# Patient Record
Sex: FEMALE | Race: BLACK OR AFRICAN AMERICAN | Employment: UNEMPLOYED | URBAN - METROPOLITAN AREA
[De-identification: names, ages, dates, MRNs, and addresses within clinical notes are randomized per-mention and may not be internally consistent; named-entity substitution may affect disease eponyms.]

---

## 2017-01-06 ENCOUNTER — OFFICE VISIT (OUTPATIENT)
Dept: FAMILY MEDICINE CLINIC | Age: 57
End: 2017-01-06

## 2017-01-06 VITALS
HEIGHT: 67 IN | TEMPERATURE: 97.6 F | DIASTOLIC BLOOD PRESSURE: 70 MMHG | OXYGEN SATURATION: 96 % | HEART RATE: 61 BPM | BODY MASS INDEX: 25.27 KG/M2 | WEIGHT: 161 LBS | SYSTOLIC BLOOD PRESSURE: 105 MMHG | RESPIRATION RATE: 18 BRPM

## 2017-01-06 DIAGNOSIS — F33.2 SEVERE EPISODE OF RECURRENT MAJOR DEPRESSIVE DISORDER, WITHOUT PSYCHOTIC FEATURES (HCC): ICD-10-CM

## 2017-01-06 DIAGNOSIS — M54.50 ACUTE BILATERAL LOW BACK PAIN WITHOUT SCIATICA: ICD-10-CM

## 2017-01-06 DIAGNOSIS — M79.7 FIBROMYALGIA: ICD-10-CM

## 2017-01-06 RX ORDER — PAROXETINE HYDROCHLORIDE 20 MG/1
20 TABLET, FILM COATED ORAL DAILY
Qty: 30 TAB | Refills: 1 | Status: SHIPPED | OUTPATIENT
Start: 2017-01-06 | End: 2017-01-11

## 2017-01-06 RX ORDER — TRAZODONE HYDROCHLORIDE 50 MG/1
50 TABLET ORAL
Qty: 30 TAB | Refills: 1 | Status: SHIPPED | OUTPATIENT
Start: 2017-01-06 | End: 2017-01-11 | Stop reason: SDUPTHER

## 2017-01-06 RX ORDER — GABAPENTIN 300 MG/1
300 CAPSULE ORAL 3 TIMES DAILY
Qty: 90 CAP | Refills: 1 | Status: SHIPPED | OUTPATIENT
Start: 2017-01-06 | End: 2017-02-07 | Stop reason: SDUPTHER

## 2017-01-06 NOTE — PROGRESS NOTES
Subjective:      Yonny Fishman is an 49 y.o. female seen for follow up on fibromyalgia. Her symptoms are made worse by: overuse, illness. They are helped by heat, rest.   Associated symptoms include fatigue. Patient denies associated fevers, muscle weakness. Previous treatments include prescription meds - see below. Tolerating gabapentin well, does report some mild drowsiness at times  She will be starting physical therapy for her fibromyalgia and shoulder injury next week. Limitation on activities include none. Reports significant improvement in depression symptoms since restarting trazodone and paroxetine. Tolerating medications well without apparent se. Has appointment to establish care with psychiatrist next week. Denies suicidal thoughts or behaviors.       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     Allergies   Allergen Reactions    Ambien [Zolpidem] Unknown (comments)     Causes Migraine    Cephalexin Rash    Morphine Rash    Motrin [Ibuprofen] Swelling    Nortriptyline Unknown (comments)     Bleeding from mouth    Protonix [Pantoprazole] Rash    Vicodin [Hydrocodone-Acetaminophen] Unknown (comments)     Causes Migraines    Vitamins For Infusion Unknown (comments)     Pt stated all vitamins cause her lightheadness and sick     Past Medical History   Diagnosis Date    Anal fistula     Asthma     Back ache     Cholesterol blood decreased     Dementia     Depression     Deviated septum     Fibromyalgia     Frequent headaches     Heart valve disease     Herniated nucleus pulposus, L5-S1     Migraine     Protein S deficiency (HCC)     Pulmonary emboli (HCC)     Pulmonary hypertension (HCC)     Restless leg syndrome     RLS (restless legs syndrome)     Sickle cell trait ([facility]     Sleep apnea     Stroke [facility])     Upper airway resistance syndrome     Vaginal fistula      Past Surgical History   Procedure Laterality Date    Pr cardiac surg procedure unlist  9/23/99     closure of unroofed coronary sinus     Family History   Problem Relation Age of Onset    Diabetes Mother     Heart Disease Mother     Hypertension Mother     Sickle Cell Anemia Sister     Hypertension Brother     Stroke Maternal Aunt      Social History   Substance Use Topics    Smoking status: Never Smoker    Smokeless tobacco: Never Used    Alcohol use No        Review of Systems  A comprehensive review of systems was negative except for that written in the HPI. Objective:     Visit Vitals    /70 (BP 1 Location: Right arm, BP Patient Position: Sitting)    Pulse 61    Temp 97.6 °F (36.4 °C) (Oral)    Resp 18    Ht 5' 7\" (1.702 m)    Wt 161 lb (73 kg)    SpO2 96%    BMI 25.22 kg/m2     General appearance: alert, cooperative, no distress, appears stated age  Neck: supple, symmetrical, trachea midline, no adenopathy and no JVD  Lungs: clear to auscultation bilaterally  Heart: regular rate and rhythm, S1, S2 normal, no murmur, click, rub or gallop  Extremities: extremities normal, atraumatic, no cyanosis or edema  Neurologic: Grossly normal    Tender Points: none    Assessment:     Fibromyalgia. Severity = mild, tolerable. Overall course = gradually improving      ICD-10-CM ICD-9-CM   1. Acute bilateral low back pain without sciatica M54.5 724.2     338.19   2. Fibromyalgia M79.7 729.1   3. Severe episode of recurrent major depressive disorder, without psychotic features ([facility] F33.2 296.33       Plan:     Orders Placed This Encounter    gabapentin (NEURONTIN) 300 mg capsule    traZODone (DESYREL) 50 mg tablet    PARoxetine (PAXIL) 20 mg tablet     Discussed fibromyalgia with patient along with my philosophy of management.   Continue gabapentin  Start PT as planned  Keep scheduled appt with pyschiatrist    Follow-up Disposition:  Return in about 3 months (around 4/6/2017) for f/u depression, fibromyalgia. I have discussed the diagnosis with the patient and the intended plan as seen in the above orders. The patient has received an after-visit summary and questions were answered concerning future plans. Patient conveyed understanding of the plan at the time of the visit. Marquita Smith NP  01/06/17      .

## 2017-01-06 NOTE — MR AVS SNAPSHOT
Visit Information Date & Time Provider Department Dept. Phone Encounter #  
 1/6/2017 11:00 AM Ricke Blizzard,  [facility] [phone] [id number] Follow-up Instructions Return in about 3 months (around 4/6/2017) for f/u depression, fibromyalgia. Your Appointments 1/11/2017  9:00 AM  
New Patient with Mariana Schaumann, NP [facility] ([address]) Appt Note: new pt for depression referred by JUANA Soto [address] [facility] Liliya Pickett 178  
  
   
 [address] [facility] [address] [facility] [id number] Upcoming Health Maintenance Date Due Hepatitis C Screening 1960 BREAST CANCER SCRN MAMMOGRAM 11/25/2012 FOBT Q 1 YEAR AGE 50-75 3/22/2017 PAP AKA CERVICAL CYTOLOGY 12/13/2019 DTaP/Tdap/Td series (2 - Td) 12/13/2026 Allergies as of 1/6/2017  Review Complete On: 1/6/2017 By: Danilo Clear Severity Noted Reaction Type Reactions Ambien [Zolpidem]  09/09/2016    Unknown (comments) Causes Migraine Cephalexin  03/17/2016    Rash Morphine  03/17/2016    Rash Motrin [Ibuprofen]  09/09/2016    Swelling Nortriptyline  09/09/2016    Unknown (comments) Bleeding from mouth Protonix [Pantoprazole]  03/17/2016    Rash Vicodin [Hydrocodone-acetaminophen]  09/09/2016    Unknown (comments) Causes Migraines Vitamins For Infusion  09/09/2016    Unknown (comments) Pt stated all vitamins cause her lightheadness and sick Current Immunizations  Reviewed on 6/16/2016 Name Date Influenza High Dose Vaccine PF 8/15/2016 Influenza Vaccine 9/1/2015 Pneumococcal Vaccine (Unspecified Type) 9/1/2015 Tdap 12/13/2016 Not reviewed this visit You Were Diagnosed With   
  
 Codes Comments Acute bilateral low back pain without sciatica     ICD-10-CM: M54.5 ICD-9-CM: 724.2, 338.19 Fibromyalgia     ICD-10-CM: M79.7 ICD-9-CM: 729.1 Severe episode of recurrent major depressive disorder, without psychotic features ([facility]     ICD-10-CM: F33.2 ICD-9-CM: 296.33 Vitals BP Pulse Temp Resp Height(growth percentile) Weight(growth percentile) 105/70 (BP 1 Location: Right arm, BP Patient Position: Sitting) 61 97.6 °F (36.4 °C) (Oral) 18 5' 7\" (1.702 m) 161 lb (73 kg) SpO2 BMI OB Status Smoking Status 96% 25.22 kg/m2 Hysterectomy Never Smoker BMI and BSA Data Body Mass Index Body Surface Area  
 25.22 kg/m 2 1.86 m 2 Preferred Pharmacy Pharmacy Name Phone [facility] [address] [place] [phone] Your Updated Medication List  
  
   
This list is accurate as of: 1/6/17 11:14 AM.  Always use your most recent med list.  
  
  
  
  
 albuterol 2.5 mg /3 mL (0.083 %) nebulizer solution Commonly known as:  PROVENTIL VENTOLIN  
by Nebulization route once. ARIPiprazole 2 mg tablet Commonly known as:  ABILIFY Take 2 mg by mouth daily. aspirin 81 mg chewable tablet Take 81 mg by mouth daily. buPROPion  mg XL tablet Commonly known as:  Tresa Sizer Take 300 mg by mouth every morning. Cetirizine 10 mg Cap Take  by mouth. DEPAKOTE 250 mg tablet Generic drug:  divalproex DR Take 250 mg by mouth three (3) times daily. donepezil 10 mg tablet Commonly known as:  ARICEPT Take 10 mg by mouth nightly. fluticasone 50 mcg/actuation nasal spray Commonly known as:  Cathlyn Nelli 2 Sprays by Both Nostrils route daily. fluticasone-salmeterol 500-50 mcg/dose diskus inhaler Commonly known as:  ADVAIR Take 1 Puff by inhalation every twelve (12) hours. gabapentin 300 mg capsule Commonly known as:  NEURONTIN Take 1 Cap by mouth three (3) times daily. melatonin 3 mg tablet Take  by mouth.  
  
 montelukast 10 mg tablet Commonly known as:  SINGULAIR  
 Take 1 Tab by mouth daily. NAMENDA XR 28 mg capsule Generic drug:  memantine ER One cap once daily. PARoxetine 20 mg tablet Commonly known as:  PAXIL Take 1 Tab by mouth daily. raNITIdine 150 mg tablet Commonly known as:  ZANTAC Take 150 mg by mouth two (2) times a day. simethicone 180 mg Cap Take  by mouth. simvastatin 20 mg tablet Commonly known as:  ZOCOR Take 1 Tab by mouth nightly. topiramate 100 mg tablet Commonly known as:  TOPAMAX Take  by mouth two (2) times a day. traZODone 50 mg tablet Commonly known as:  Yeh Ofelia Take 1 Tab by mouth nightly as needed for Sleep. Prescriptions Sent to Pharmacy Refills  
 gabapentin (NEURONTIN) 300 mg capsule 1 Sig: Take 1 Cap by mouth three (3) times daily. Class: Normal  
 Pharmacy: [address] #: [phone] Route: Oral  
 traZODone (DESYREL) 50 mg tablet 1 Sig: Take 1 Tab by mouth nightly as needed for Sleep. Class: Normal  
 Pharmacy: [address] #: [phone] Route: Oral  
 PARoxetine (PAXIL) 20 mg tablet 1 Sig: Take 1 Tab by mouth daily. Class: Normal  
 Pharmacy: [address] #: [phone] Route: Oral  
  
Follow-up Instructions Return in about 3 months (around 4/6/2017) for f/u depression, fibromyalgia. To-Do List   
 03/13/2017 11:00 AM  
  Appointment with Elton Urbina MD at . [facility] ([phone]) Patient Instructions Recovering From Depression: Care Instructions Your Care Instructions Taking good care of yourself is important as you recover from depression. In time, your symptoms will fade as your treatment takes hold. Do not give up. Instead, focus your energy on getting better. Your mood will improve. It just takes some time. Focus on things that can help you feel better, such as being with friends and family, eating well, and getting enough rest. But take things slowly. Do not do too much too soon. You will begin to feel better gradually. Follow-up care is a key part of your treatment and safety. Be sure to make and go to all appointments, and call your doctor if you are having problems. It's also a good idea to know your test results and keep a list of the medicines you take. How can you care for yourself at home? Be realistic · If you have a large task to do, break it up into smaller steps you can handle, and just do what you can. · You may want to put off important decisions until your depression has lifted. If you have plans that will have a major impact on your life, such as marriage, divorce, or a job change, try to wait a bit. Talk it over with friends and loved ones who can help you look at the overall picture first. 
· Reaching out to people for help is important. Do not isolate yourself. Let your family and friends help you. Find someone you can trust and confide in, and talk to that person. · Be patient, and be kind to yourself. Remember that depression is not your fault and is not something you can overcome with willpower alone. Treatment is necessary for depression, just like for any other illness. Feeling better takes time, and your mood will improve little by little. Stay active · Stay busy and get outside. Take a walk, or try some other light exercise. · Talk with your doctor about an exercise program. Exercise can help with mild depression. · Go to a movie or concert. Take part in a Judaism activity or other social gathering. Go to a ball game. · Ask a friend to have dinner with you. Take care of yourself · Eat a balanced diet with plenty of fresh fruits and vegetables, whole grains, and lean protein.  If you have lost your appetite, eat small snacks rather than large meals. · Avoid drinking alcohol or using illegal drugs. Do not take medicines that have not been prescribed for you. They may interfere with medicines you may be taking for depression, or they may make your depression worse. · Take your medicines exactly as they are prescribed. You may start to feel better within 1 to 3 weeks of taking antidepressant medicine. But it can take as many as 6 to 8 weeks to see more improvement. If you have questions or concerns about your medicines, or if you do not notice any improvement by 3 weeks, talk to your doctor. · If you have any side effects from your medicine, tell your doctor. Antidepressants can make you feel tired, dizzy, or nervous. Some people have dry mouth, constipation, headaches, sexual problems, or diarrhea. Many of these side effects are mild and will go away on their own after you have been taking the medicine for a few weeks. Some may last longer. Talk to your doctor if side effects are bothering you too much. You might be able to try a different medicine. · Get enough sleep. If you have problems sleeping: ¨ Go to bed at the same time every night, and get up at the same time every morning. ¨ Keep your bedroom dark and quiet. ¨ Do not exercise after 5:00 p.m. ¨ Avoid drinks with caffeine after 5:00 p.m. · Avoid sleeping pills unless they are prescribed by the doctor treating your depression. Sleeping pills may make you groggy during the day, and they may interact with other medicine you are taking. · If you have any other illnesses, such as diabetes, heart disease, or high blood pressure, make sure to continue with your treatment. Tell your doctor about all of the medicines you take, including those with or without a prescription. · Keep the numbers for these national suicide hotlines: 1-139-628-TALK (7-580.480.1471) and 1-841-BLSVRUY (2-717.182.9941).  If you or someone you know talks about suicide or feeling hopeless, get help right away. When should you call for help? Call 911 anytime you think you may need emergency care. For example, call if: 
· You feel like hurting yourself or someone else. · Someone you know has depression and is about to attempt or is attempting suicide. Call your doctor now or seek immediate medical care if: 
· You hear voices. · Someone you know has depression and: 
¨ Starts to give away his or her possessions. ¨ Uses illegal drugs or drinks alcohol heavily. ¨ Talks or writes about death, including writing suicide notes or talking about guns, knives, or pills. ¨ Starts to spend a lot of time alone. ¨ Acts very aggressively or suddenly appears calm. Watch closely for changes in your health, and be sure to contact your doctor if: 
· You do not get better as expected. Where can you learn more? Go to http://shell-td.info/. Enter V763 in the search box to learn more about \"Recovering From Depression: Care Instructions. \" Current as of: July 26, 2016 Content Version: 11.1 © 1849-7508 [employer]. Care instructions adapted under license by [employer] (which disclaims liability or warranty for this information). If you have questions about a medical condition or this instruction, always ask your healthcare professional. [address] any warranty or liability for your use of this information. Fibromyalgia: Care Instructions Your Care Instructions Fibromyalgia is a painful condition that is not completely understood by medical experts. The cause of fibromyalgia is not known. It can make you feel tired and ache all over. It causes tender spots at specific points of the body that hurt only when you press on them. You may have trouble sleeping, as well as other symptoms. These problems can upset your work and home life. Symptoms tend to come and go, although they may never go away completely. Fibromyalgia does not harm your muscles, joints, or organs. Follow-up care is a key part of your treatment and safety. Be sure to make and go to all appointments, and call your doctor if you are having problems. It's also a good idea to know your test results and keep a list of the medicines you take. How can you care for yourself at home? · Exercise often. Walk, swim, or bike to help with pain and sleep problems and to make you feel better. · Try to get a good night's sleep. Go to bed and get up at the same time each day, whether you feel rested or not. Make sure you have a good mattress and pillow. · Reduce stress. Avoid things that cause you stress, if you can. If not, work at making them less stressful. Learn to use biofeedback, guided imagery, meditation, or other methods to relax. · Make healthy changes. Eat a balanced diet, quit smoking, and limit alcohol and caffeine. · Use a heating pad set on low or take warm baths or showers for pain. Using cold packs for up to 20 minutes at a time can also relieve pain. Put a thin cloth between the cold pack and your skin. A gentle massage might help too. · Be safe with medicines. Take your medicines exactly as prescribed. Call your doctor if you think you are having a problem with your medicine. Your doctor may talk to you about taking antidepressant medicines. These medicines may improve sleep, relieve pain, and in some cases treat depression. · Learn about fibromyalgia. This makes coping easier. Then, take an active role in your treatment. · Think about joining a support group with others who have fibromyalgia to learn more and get support. When should you call for help? Watch closely for changes in your health, and be sure to contact your doctor if: 
· You feel sad, helpless, or hopeless; lose interest in things you used to enjoy; or have other symptoms of depression. · Your fibromyalgia symptoms get worse. Where can you learn more? Go to http://shell-dt.info/. Enter V003 in the search box to learn more about \"Fibromyalgia: Care Instructions. \" Current as of: April 18, 2016 Content Version: 11.1 © 2254-2416 [employer]. Care instructions adapted under license by [employer] (which disclaims liability or warranty for this information). If you have questions about a medical condition or this instruction, always ask your healthcare professional. [address] any warranty or liability for your use of this information. Introducing [facility]! [facility] introduces [employer] patient portal. Now you can access parts of your medical record, email your doctor's office, and request medication refills online. 1. In your internet browser, go to [email or web address] 2. Click on the First Time User? Click Here link in the Sign In box. You will see the New Member Sign Up page. 3. Enter your [employer] Access Code exactly as it appears below. You will not need to use this code after youve completed the sign-up process. If you do not sign up before the expiration date, you must request a new code. · [employer] Access Code: [email or web address] Expires: 4/6/2017 11:14 AM 
 
4. Enter the last four digits of your Social Security Number (xxxx) and Date of Birth (mm/dd/yyyy) as indicated and click Submit. You will be taken to the next sign-up page. 5. Create a [employer] ID. This will be your [employer] login ID and cannot be changed, so think of one that is secure and easy to remember. 6. Create a [employer] password. You can change your password at any time. 7. Enter your Password Reset Question and Answer. This can be used at a later time if you forget your password. 8. Enter your e-mail address. You will receive e-mail notification when new information is available in [address] 9. Click Sign Up. You can now view and download portions of your medical record. 10. Click the Download Summary menu link to download a portable copy of your medical information. If you have questions, please visit the Frequently Asked Questions section of the [employer] website. Remember, [employer] is NOT to be used for urgent needs. For medical emergencies, dial 911. Now available from your iPhone and Android! Please provide this summary of care documentation to your next provider. Your primary care clinician is listed as Kojo Degroot. If you have any questions after today's visit, please call [phone].

## 2017-01-06 NOTE — PROGRESS NOTES
Chief Complaint   Patient presents with    Pain (Chronic)     4 week follow up, fibromyalgia    Depression     4 week follow up       1. Have you been to the ER, urgent care clinic since your last visit? Hospitalized since your last visit? No    2. Have you seen or consulted any other health care providers outside of the [address] [place] [address] since your last visit? Include any pap smears or colon screening.  Yes Neurologist

## 2017-01-06 NOTE — PATIENT INSTRUCTIONS
Recovering From Depression: Care Instructions  Your Care Instructions  Taking good care of yourself is important as you recover from depression. In time, your symptoms will fade as your treatment takes hold. Do not give up. Instead, focus your energy on getting better. Your mood will improve. It just takes some time. Focus on things that can help you feel better, such as being with friends and family, eating well, and getting enough rest. But take things slowly. Do not do too much too soon. You will begin to feel better gradually. Follow-up care is a key part of your treatment and safety. Be sure to make and go to all appointments, and call your doctor if you are having problems. It's also a good idea to know your test results and keep a list of the medicines you take. How can you care for yourself at home? Be realistic  · If you have a large task to do, break it up into smaller steps you can handle, and just do what you can. · You may want to put off important decisions until your depression has lifted. If you have plans that will have a major impact on your life, such as marriage, divorce, or a job change, try to wait a bit. Talk it over with friends and loved ones who can help you look at the overall picture first.  · Reaching out to people for help is important. Do not isolate yourself. Let your family and friends help you. Find someone you can trust and confide in, and talk to that person. · Be patient, and be kind to yourself. Remember that depression is not your fault and is not something you can overcome with willpower alone. Treatment is necessary for depression, just like for any other illness. Feeling better takes time, and your mood will improve little by little. Stay active  · Stay busy and get outside. Take a walk, or try some other light exercise. · Talk with your doctor about an exercise program. Exercise can help with mild depression. · Go to a movie or concert.  Take part in a Christianity activity or other social gathering. Go to a Invisible game. · Ask a friend to have dinner with you. Take care of yourself  · Eat a balanced diet with plenty of fresh fruits and vegetables, whole grains, and lean protein. If you have lost your appetite, eat small snacks rather than large meals. · Avoid drinking alcohol or using illegal drugs. Do not take medicines that have not been prescribed for you. They may interfere with medicines you may be taking for depression, or they may make your depression worse. · Take your medicines exactly as they are prescribed. You may start to feel better within 1 to 3 weeks of taking antidepressant medicine. But it can take as many as 6 to 8 weeks to see more improvement. If you have questions or concerns about your medicines, or if you do not notice any improvement by 3 weeks, talk to your doctor. · If you have any side effects from your medicine, tell your doctor. Antidepressants can make you feel tired, dizzy, or nervous. Some people have dry mouth, constipation, headaches, sexual problems, or diarrhea. Many of these side effects are mild and will go away on their own after you have been taking the medicine for a few weeks. Some may last longer. Talk to your doctor if side effects are bothering you too much. You might be able to try a different medicine. · Get enough sleep. If you have problems sleeping:  ¨ Go to bed at the same time every night, and get up at the same time every morning. ¨ Keep your bedroom dark and quiet. ¨ Do not exercise after 5:00 p.m. ¨ Avoid drinks with caffeine after 5:00 p.m. · Avoid sleeping pills unless they are prescribed by the doctor treating your depression. Sleeping pills may make you groggy during the day, and they may interact with other medicine you are taking. · If you have any other illnesses, such as diabetes, heart disease, or high blood pressure, make sure to continue with your treatment.  Tell your doctor about all of the medicines you take, including those with or without a prescription. · Keep the numbers for these national suicide hotlines: 5-051-836-TALK (2-805.765.6444) and 8-128-NCACRKX (5-175.204.3579). If you or someone you know talks about suicide or feeling hopeless, get help right away. When should you call for help? Call 911 anytime you think you may need emergency care. For example, call if:  · You feel like hurting yourself or someone else. · Someone you know has depression and is about to attempt or is attempting suicide. Call your doctor now or seek immediate medical care if:  · You hear voices. · Someone you know has depression and:  ¨ Starts to give away his or her possessions. ¨ Uses illegal drugs or drinks alcohol heavily. ¨ Talks or writes about death, including writing suicide notes or talking about guns, knives, or pills. ¨ Starts to spend a lot of time alone. ¨ Acts very aggressively or suddenly appears calm. Watch closely for changes in your health, and be sure to contact your doctor if:  · You do not get better as expected. Where can you learn more? Go to http://shell-td.info/. Enter Q650 in the search box to learn more about \"Recovering From Depression: Care Instructions. \"  Current as of: July 26, 2016  Content Version: 11.1  © 8350-3719 [employer]. Care instructions adapted under license by [employer] (which disclaims liability or warranty for this information). If you have questions about a medical condition or this instruction, always ask your healthcare professional. [place] [address] any warranty or liability for your use of this information. Fibromyalgia: Care Instructions  Your Care Instructions  Fibromyalgia is a painful condition that is not completely understood by medical experts. The cause of fibromyalgia is not known. It can make you feel tired and ache all over.  It causes tender spots at specific points of the body that hurt only when you press on them. You may have trouble sleeping, as well as other symptoms. These problems can upset your work and home life. Symptoms tend to come and go, although they may never go away completely. Fibromyalgia does not harm your muscles, joints, or organs. Follow-up care is a key part of your treatment and safety. Be sure to make and go to all appointments, and call your doctor if you are having problems. It's also a good idea to know your test results and keep a list of the medicines you take. How can you care for yourself at home? · Exercise often. Walk, swim, or bike to help with pain and sleep problems and to make you feel better. · Try to get a good night's sleep. Go to bed and get up at the same time each day, whether you feel rested or not. Make sure you have a good mattress and pillow. · Reduce stress. Avoid things that cause you stress, if you can. If not, work at making them less stressful. Learn to use biofeedback, guided imagery, meditation, or other methods to relax. · Make healthy changes. Eat a balanced diet, quit smoking, and limit alcohol and caffeine. · Use a heating pad set on low or take warm baths or showers for pain. Using cold packs for up to 20 minutes at a time can also relieve pain. Put a thin cloth between the cold pack and your skin. A gentle massage might help too. · Be safe with medicines. Take your medicines exactly as prescribed. Call your doctor if you think you are having a problem with your medicine. Your doctor may talk to you about taking antidepressant medicines. These medicines may improve sleep, relieve pain, and in some cases treat depression. · Learn about fibromyalgia. This makes coping easier. Then, take an active role in your treatment. · Think about joining a support group with others who have fibromyalgia to learn more and get support. When should you call for help?   Watch closely for changes in your health, and be sure to contact your doctor if:  · You feel sad, helpless, or hopeless; lose interest in things you used to enjoy; or have other symptoms of depression. · Your fibromyalgia symptoms get worse. Where can you learn more? Go to http://shell-td.info/. Enter V003 in the search box to learn more about \"Fibromyalgia: Care Instructions. \"  Current as of: April 18, 2016  Content Version: 11.1  © 4352-0598 [employer]. Care instructions adapted under license by [employer] (which disclaims liability or warranty for this information). If you have questions about a medical condition or this instruction, always ask your healthcare professional. [address] any warranty or liability for your use of this information.

## 2017-01-09 ENCOUNTER — TELEPHONE (OUTPATIENT)
Dept: FAMILY MEDICINE CLINIC | Age: 57
End: 2017-01-09

## 2017-01-09 DIAGNOSIS — I38 HEART VALVE DISEASE: Primary | ICD-10-CM

## 2017-01-11 ENCOUNTER — OFFICE VISIT (OUTPATIENT)
Dept: BEHAVIORAL/MENTAL HEALTH CLINIC | Age: 57
End: 2017-01-11

## 2017-01-11 VITALS
BODY MASS INDEX: 25.74 KG/M2 | HEART RATE: 57 BPM | OXYGEN SATURATION: 99 % | WEIGHT: 164 LBS | SYSTOLIC BLOOD PRESSURE: 123 MMHG | HEIGHT: 67 IN | DIASTOLIC BLOOD PRESSURE: 70 MMHG

## 2017-01-11 DIAGNOSIS — F43.10 PTSD (POST-TRAUMATIC STRESS DISORDER): ICD-10-CM

## 2017-01-11 DIAGNOSIS — F41.9 ANXIETY: ICD-10-CM

## 2017-01-11 DIAGNOSIS — F33.2 SEVERE EPISODE OF RECURRENT MAJOR DEPRESSIVE DISORDER, WITHOUT PSYCHOTIC FEATURES (HCC): Primary | ICD-10-CM

## 2017-01-11 RX ORDER — TRAZODONE HYDROCHLORIDE 100 MG/1
100 TABLET ORAL
Qty: 30 TAB | Refills: 0 | Status: SHIPPED | OUTPATIENT
Start: 2017-01-11 | End: 2017-02-23 | Stop reason: SDUPTHER

## 2017-01-11 RX ORDER — BUPROPION HYDROCHLORIDE 150 MG/1
150 TABLET ORAL
Qty: 30 TAB | Refills: 0 | Status: SHIPPED | OUTPATIENT
Start: 2017-01-11 | End: 2017-02-23 | Stop reason: SDUPTHER
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
## 2017-01-11 NOTE — PATIENT INSTRUCTIONS
Sleep Tips    What to avoid    · Do not have drinks with caffeine, such as coffee or black tea, for 8 hours before bed. · Do not smoke or use other types of tobacco near bedtime. Nicotine is a stimulant and can keep you awake. · Avoid drinking alcohol late in the evening, because it can cause you to wake in the middle of the night. · Do not eat a big meal close to bedtime. If you are hungry, eat a light snack. · Do not drink a lot of water close to bedtime, because the need to urinate may wake you up during the night. · Do not read or watch TV in bed. Use the bed only for sleeping and sexual activity. What to try    · Go to bed at the same time every night, and wake up at the same time every morning. Do not take naps during the day. · Keep your bedroom quiet, dark, and cool. · Get regular exercise, but not within 3 to 4 hours of your bedtime. · Sleep on a comfortable pillow and mattress. · If watching the clock makes you anxious, turn it facing away from you so you cannot see the time. · If you worry when you lie down, start a worry book. Well before bedtime, write down your worries, and then set the book and your concerns aside. · Try meditation or other relaxation techniques before you go to bed. · If you cannot fall asleep, get up and go to another room until you feel sleepy. Do something relaxing. Repeat your bedtime routine before you go to bed again. Make your house quiet and calm about an hour before bedtime. Turn down the lights, turn off the TV, log off the computer, and turn down the volume on music. This can help you relax after a busy day. Post-Traumatic Stress Disorder (PTSD): Care Instructions  Your Care Instructions  Post-traumatic stress disorder (PTSD) is a mental condition that can result from being in or seeing a traumatic or terrifying event. These events can include combat, a terrorist attack, a natural disaster, a serious accident, an assault, or a rape.  If you have PTSD, you may often relive the experience in nightmares or flashbacks. These are clear and frightening memories of the event. You may also have trouble sleeping. PTSD affects people in very different ways. It can interfere with daily activities such as work or school, and it can make you withdraw from friends or loved ones. Follow-up care is a key part of your treatment and safety. Be sure to make and go to all appointments, and call your doctor if you are having problems. It's also a good idea to know your test results and keep a list of the medicines you take. How can you care for yourself at home? Take medicines exactly as directed. Call your doctor if you think you are having a problem with your medicine. Go to your counseling sessions and follow-up appointments. Recognize and accept your anxiety. Then, when you are in a situation that makes you anxious, say to yourself, \"This is not an emergency. I feel uncomfortable, but I am not in danger. I can keep going even if I feel anxious. \"  Be kind to your body:  Relieve tension with exercise or a massage. Get enough rest.  Avoid alcohol, caffeine, nicotine, and illegal drugs. They can increase your anxiety level and cause sleep problems. Learn and do relaxation techniques. See below for more about these techniques. Engage your mind. Get out and do something you enjoy. Go to a funny movie, or take a walk or hike. Plan your day. Having too much or too little to do can make you anxious. Keep a record of your symptoms. Discuss your fears with a good friend or family member, or join a support group for people with similar problems. Talking to others sometimes relieves stress. Get involved in social groups, or volunteer to help others. Being alone sometimes makes things seem worse than they are. Get at least 30 minutes of exercise on most days of the week. Walking is a good choice.  You also may want to do other activities, such as running, swimming, cycling, or playing tennis or team sports. Keep the numbers for these national suicide hotlines: 8-080-944-TALK (6-668.797.7239) and 6-751-XSRAIVQ (7-222.616.6125). If you or someone you know talks about suicide or feeling hopeless, get help right away. Relaxation techniques  Do relaxation exercises 10 to 20 minutes a day. You can play soothing, relaxing music while you do them, if you wish. Tell others in your house that you are going to do your relaxation exercises. Ask them not to disturb you. Find a comfortable place, away from all distractions and noise. Lie down on your back, or sit with your back straight. Focus on your breathing. Make it slow and steady. Breathe in through your nose. Breathe out through either your nose or mouth. Breathe deeply, filling up the area between your navel and your rib cage. Breathe so that your belly goes up and down. Do not hold your breath. Breathe like this for 5 to 10 minutes. Notice the feeling of calmness throughout your whole body. As you continue to breathe slowly and deeply, relax by doing the following for another 5 to 10 minutes:  Tighten and relax each muscle group in your body. You can begin at your toes and work your way up to your head. Imagine your muscle groups relaxing and becoming heavy. Empty your mind of all thoughts. Let yourself relax more and more deeply. Become aware of the state of calmness that surrounds you. When your relaxation time is over, you can bring yourself back to alertness by moving your fingers and toes and then your hands and feet and then stretching and moving your entire body. Sometimes people fall asleep during relaxation, but they usually wake up shortly afterward. Always give yourself time to return to full alertness before you drive a car or do anything that might cause an accident if you are not fully alert. Never play a relaxation tape while you drive a car. When should you call for help?   Call 911 anytime you think you may need emergency care. For example, call if:  You feel you cannot stop from hurting yourself or someone else. Watch closely for changes in your health, and be sure to contact your doctor if:  Your PTSD symptoms are getting worse. You have new or worsening symptoms of anxiety. You are not getting better as expected. Where can you learn more? Go to http://shell-td.info/. Sarah Nelson in the search box to learn more about \"Post-Traumatic Stress Disorder (PTSD): Care Instructions. \"  Current as of: July 26, 2016  Content Version: 11.1  © 1867-0607 [employer]. Care instructions adapted under license by [employer] (which disclaims liability or warranty for this information). If you have questions about a medical condition or this instruction, always ask your healthcare professional. [address] any warranty or liability for your use of this information. Depression and Chronic Disease: Care Instructions  Your Care Instructions  A chronic disease is one that you have for a long time. Some chronic diseases can be controlled, but they usually cannot be cured. Depression is common in people with chronic diseases, but it often goes unnoticed. Many people have concerns about seeking treatment for a mental health problem. You may think it's a sign of weakness, or you don't want people to know about it. It's important to overcome these reasons for not seeking treatment. Treating depression or anxiety is good for your health. Follow-up care is a key part of your treatment and safety. Be sure to make and go to all appointments, and call your doctor if you are having problems. It's also a good idea to know your test results and keep a list of the medicines you take. How can you care for yourself at home?   Watch for symptoms of depression  The symptoms of depression are often subtle at first. You may think they are caused by your disease rather than depression. Or you may think it is normal to be depressed when you have a chronic disease. If you are depressed you may:  Feel sad or hopeless. Feel guilty or worthless. Not enjoy the things you used to enjoy. Feel hopeless, as though life is not worth living. Have trouble thinking or remembering. Have low energy, and you may not eat or sleep well. Pull away from others. Think often about death or killing yourself. (Keep the numbers for these national suicide hotlines: 9-626-221-TALK [1-779.499.6558] and 5-943-YIHVAZV [1-829.975.6003]. )  Get treatment  By treating your depression, you can feel more hopeful and have more energy. If you feel better, you may take better care of yourself, so your health may improve. Talk to your doctor if you have any changes in mood during treatment for your disease. Ask your doctor for help. Counseling, antidepressant medicine, or a combination of the two can help most people with depression. Often a combination works best. Counseling can also help you cope with having a chronic disease. When should you call for help? Call 911 anytime you think you may need emergency care. For example, call if:  You feel like hurting yourself or someone else. Someone you know has depression and is about to attempt or is attempting suicide. Call your doctor now or seek immediate medical care if:  You hear voices. Someone you know has depression and:  Starts to give away his or her possessions. Uses illegal drugs or drinks alcohol heavily. Talks or writes about death, including writing suicide notes or talking about guns, knives, or pills. Starts to spend a lot of time alone. Acts very aggressively or suddenly appears calm. Watch closely for changes in your health, and be sure to contact your doctor if:  You do not get better as expected. Where can you learn more? Go to http://shell-td.info/.   Enter C896 in the search box to learn more about \"Depression and Chronic Disease: Care Instructions. \"  Current as of: July 26, 2016  Content Version: 11.1  © 8878-0847 [employer]. Care instructions adapted under license by [employer] (which disclaims liability or warranty for this information). If you have questions about a medical condition or this instruction, always ask your healthcare professional. [place] [address] any warranty or liability for your use of this information.   ·

## 2017-01-12 DIAGNOSIS — E78.00 HYPERCHOLESTEREMIA: ICD-10-CM

## 2017-01-12 RX ORDER — SIMVASTATIN 20 MG/1
20 TABLET, FILM COATED ORAL
Qty: 30 TAB | Refills: 5 | Status: SHIPPED | OUTPATIENT
Start: 2017-01-12 | End: 2017-06-20 | Stop reason: SDUPTHER

## 2017-01-17 ENCOUNTER — TELEPHONE (OUTPATIENT)
Dept: FAMILY MEDICINE CLINIC | Age: 57
End: 2017-01-17

## 2017-01-17 NOTE — TELEPHONE ENCOUNTER
The patient states she can no longer see her neurologist; the patient states she has [employer]; the patient is requesting the name and phone number of a new neurologist; the patient would like a neurologist who would also do a sleep study; the best contact number for the patient is [phone]; thank you    The patient states that the insurance company did not tell her which doctors were in network with them; they suggested she call her PCP to get a recommendation; thank you

## 2017-01-20 NOTE — TELEPHONE ENCOUNTER
Spoke with pt about finding a Dr and informed her of Dr. Bao Alcantar of care (I do not know of a neurologist who does sleep study. She can check to see if DR Rodger manriquez is in her network(neuro) also Dr gabino Antonio ( sleep medicine), pt verbalize understanding of the information.

## 2017-01-26 ENCOUNTER — TELEPHONE (OUTPATIENT)
Dept: FAMILY MEDICINE CLINIC | Age: 57
End: 2017-01-26

## 2017-01-26 DIAGNOSIS — M25.511 ACUTE PAIN OF RIGHT SHOULDER: Primary | ICD-10-CM

## 2017-01-26 NOTE — TELEPHONE ENCOUNTER
Patient is calling asking for a referral to Orthopedics she is seeing Avani Oumou On Monday 2/6/17 patient is being seen for her Right shoulder and having it operated on?     Patient has [employer] ins left me know once the referral is placed so that I can go and get the PA     Please advise if needed    [facility]th[facility]#[phone]

## 2017-02-07 DIAGNOSIS — M79.7 FIBROMYALGIA: ICD-10-CM

## 2017-02-07 DIAGNOSIS — M54.50 ACUTE BILATERAL LOW BACK PAIN WITHOUT SCIATICA: ICD-10-CM

## 2017-02-07 DIAGNOSIS — F43.10 PTSD (POST-TRAUMATIC STRESS DISORDER): ICD-10-CM

## 2017-02-07 DIAGNOSIS — F33.2 SEVERE EPISODE OF RECURRENT MAJOR DEPRESSIVE DISORDER, WITHOUT PSYCHOTIC FEATURES (HCC): ICD-10-CM

## 2017-02-07 RX ORDER — GABAPENTIN 300 MG/1
300 CAPSULE ORAL 3 TIMES DAILY
Qty: 90 CAP | Refills: 1 | Status: SHIPPED | OUTPATIENT
Start: 2017-02-07 | End: 2017-03-15 | Stop reason: SDUPTHER

## 2017-02-07 RX ORDER — TRAZODONE HYDROCHLORIDE 100 MG/1
100 TABLET ORAL
Qty: 30 TAB | Refills: 0 | OUTPATIENT
Start: 2017-02-07

## 2017-02-07 RX ORDER — PAROXETINE HYDROCHLORIDE 20 MG/1
20 TABLET, FILM COATED ORAL DAILY
Qty: 30 TAB | Refills: 1 | OUTPATIENT
Start: 2017-02-07

## 2017-02-07 NOTE — TELEPHONE ENCOUNTER
Last OV: 1/6/17  Last fill  Trazadone: 1/11/17 by  Diane Navas NP  Gabapentin: 1/6/17   Paxil: 1/6/17 Discontinued by  Diane Navas NP

## 2017-02-20 DIAGNOSIS — F43.10 PTSD (POST-TRAUMATIC STRESS DISORDER): ICD-10-CM

## 2017-02-20 DIAGNOSIS — F33.2 SEVERE EPISODE OF RECURRENT MAJOR DEPRESSIVE DISORDER, WITHOUT PSYCHOTIC FEATURES (HCC): ICD-10-CM

## 2017-02-20 RX ORDER — TRAZODONE HYDROCHLORIDE 100 MG/1
100 TABLET ORAL
Qty: 30 TAB | Refills: 0 | Status: CANCELLED | OUTPATIENT
Start: 2017-02-20

## 2017-02-20 RX ORDER — PAROXETINE HYDROCHLORIDE 20 MG/1
20 TABLET, FILM COATED ORAL DAILY
Qty: 30 TAB | Refills: 1 | Status: CANCELLED | OUTPATIENT
Start: 2017-02-20

## 2017-02-20 NOTE — TELEPHONE ENCOUNTER
Patient was notified on 2/7/17 that these refills should come from her [facility] Provider

## 2017-02-23 DIAGNOSIS — F33.2 SEVERE EPISODE OF RECURRENT MAJOR DEPRESSIVE DISORDER, WITHOUT PSYCHOTIC FEATURES (HCC): ICD-10-CM

## 2017-02-23 DIAGNOSIS — F43.10 PTSD (POST-TRAUMATIC STRESS DISORDER): ICD-10-CM

## 2017-02-23 RX ORDER — TRAZODONE HYDROCHLORIDE 100 MG/1
100 TABLET ORAL
Qty: 25 TAB | Refills: 0 | Status: SHIPPED | OUTPATIENT
Start: 2017-02-23 | End: 2018-03-08

## 2017-02-23 RX ORDER — BUPROPION HYDROCHLORIDE 150 MG/1
150 TABLET ORAL
Qty: 25 TAB | Refills: 0 | Status: SHIPPED | OUTPATIENT
Start: 2017-02-23 | End: 2018-03-08

## 2017-03-15 ENCOUNTER — OFFICE VISIT (OUTPATIENT)
Dept: FAMILY MEDICINE CLINIC | Age: 57
End: 2017-03-15

## 2017-03-15 VITALS
SYSTOLIC BLOOD PRESSURE: 122 MMHG | HEART RATE: 61 BPM | TEMPERATURE: 98.1 F | OXYGEN SATURATION: 98 % | HEIGHT: 67 IN | RESPIRATION RATE: 18 BRPM | DIASTOLIC BLOOD PRESSURE: 76 MMHG | BODY MASS INDEX: 26.21 KG/M2 | WEIGHT: 167 LBS

## 2017-03-15 DIAGNOSIS — R73.9 BLOOD GLUCOSE ELEVATED: ICD-10-CM

## 2017-03-15 DIAGNOSIS — K64.9 HEMORRHOIDS, UNSPECIFIED HEMORRHOID TYPE: Primary | ICD-10-CM

## 2017-03-15 DIAGNOSIS — M79.7 FIBROMYALGIA: ICD-10-CM

## 2017-03-15 DIAGNOSIS — M54.50 ACUTE BILATERAL LOW BACK PAIN WITHOUT SCIATICA: ICD-10-CM

## 2017-03-15 RX ORDER — PAROXETINE HYDROCHLORIDE 20 MG/1
TABLET, FILM COATED ORAL
Refills: 0 | COMMUNITY
Start: 2017-02-06 | End: 2017-03-29 | Stop reason: SDUPTHER

## 2017-03-15 RX ORDER — GABAPENTIN 300 MG/1
300 CAPSULE ORAL 3 TIMES DAILY
Qty: 90 CAP | Refills: 1 | Status: SHIPPED | OUTPATIENT
Start: 2017-03-15 | End: 2017-06-09 | Stop reason: SDUPTHER

## 2017-03-15 RX ORDER — TRAZODONE HYDROCHLORIDE 50 MG/1
TABLET ORAL
Refills: 0 | COMMUNITY
Start: 2017-02-06 | End: 2017-06-09 | Stop reason: SDUPTHER

## 2017-03-15 RX ORDER — HYDROCORTISONE 25 MG/G
CREAM TOPICAL 4 TIMES DAILY
Qty: 30 G | Refills: 0 | Status: SHIPPED | OUTPATIENT
Start: 2017-03-15 | End: 2017-04-07 | Stop reason: SDUPTHER

## 2017-03-15 RX ORDER — HYDROCODONE BITARTRATE AND ACETAMINOPHEN 5; 325 MG/1; MG/1
TABLET ORAL
Refills: 0 | COMMUNITY
Start: 2017-02-06 | End: 2017-04-25

## 2017-03-15 NOTE — MR AVS SNAPSHOT
Visit Information Date & Time Provider Department Dept. Phone Encounter #  
 3/15/2017  2:15 PM Keysha Franco MD [address] [place] [address] [id number] Upcoming Health Maintenance Date Due Hepatitis C Screening 1960 BREAST CANCER SCRN MAMMOGRAM 11/25/2012 FOBT Q 1 YEAR AGE 50-75 3/22/2017 PAP AKA CERVICAL CYTOLOGY 12/13/2019 DTaP/Tdap/Td series (2 - Td) 12/13/2026 Allergies as of 3/15/2017  Review Complete On: 3/15/2017 By: Keysha Farnco MD  
  
 Severity Noted Reaction Type Reactions Ambien [Zolpidem]  09/09/2016    Unknown (comments) Causes Migraine Cephalexin  03/17/2016    Rash Morphine  03/17/2016    Rash Motrin [Ibuprofen]  09/09/2016    Swelling Nortriptyline  09/09/2016    Unknown (comments) Bleeding from mouth Protonix [Pantoprazole]  03/17/2016    Rash Vicodin [Hydrocodone-acetaminophen]  09/09/2016    Unknown (comments) Causes Migraines Vitamins For Infusion  09/09/2016    Unknown (comments) Pt stated all vitamins cause her lightheadness and sick Current Immunizations  Reviewed on 6/16/2016 Name Date Influenza High Dose Vaccine PF 8/15/2016 Influenza Vaccine 9/1/2015 Pneumococcal Vaccine (Unspecified Type) 9/1/2015 Tdap 12/13/2016 Not reviewed this visit You Were Diagnosed With   
  
 Codes Comments Hemorrhoids, unspecified hemorrhoid type    -  Primary ICD-10-CM: K64.9 ICD-9-CM: 455.6 Blood glucose elevated     ICD-10-CM: R73.9 ICD-9-CM: 790.29 Unroofed coronary sinus     ICD-10-CM: Q24.8 ICD-9-CM: 746.89 Vitals BP Pulse Temp Resp Height(growth percentile) Weight(growth percentile) 122/76 (BP 1 Location: Left arm, BP Patient Position: Sitting) 61 98.1 °F (36.7 °C) (Oral) 18 5' 7\" (1.702 m) 167 lb (75.8 kg) SpO2 BMI OB Status Smoking Status 98% 26.16 kg/m2 Hysterectomy Never Smoker Vitals History BMI and BSA Data Body Mass Index Body Surface Area  
 26.16 kg/m 2 1.89 m 2 Preferred Pharmacy Pharmacy Name Phone [facility] [address] Anastacia Ahuja [phone] Your Updated Medication List  
  
   
This list is accurate as of: 3/15/17  2:17 PM.  Always use your most recent med list.  
  
  
  
  
 albuterol 2.5 mg /3 mL (0.083 %) nebulizer solution Commonly known as:  PROVENTIL VENTOLIN  
by Nebulization route once. ARIPiprazole 2 mg tablet Commonly known as:  ABILIFY Take 2 mg by mouth daily. aspirin 81 mg chewable tablet Take 81 mg by mouth daily. buPROPion  mg tablet Commonly known as:  Mariah Sulema Take 1 Tab by mouth every morning. Cetirizine 10 mg Cap Take  by mouth. DEPAKOTE 250 mg tablet Generic drug:  divalproex DR Take 250 mg by mouth three (3) times daily. donepezil 10 mg tablet Commonly known as:  ARICEPT Take 10 mg by mouth nightly. fluticasone 50 mcg/actuation nasal spray Commonly known as:  [place] 2 Sprays by Both Nostrils route daily. fluticasone-salmeterol 500-50 mcg/dose diskus inhaler Commonly known as:  ADVAIR Take 1 Puff by inhalation every twelve (12) hours. gabapentin 300 mg capsule Commonly known as:  NEURONTIN Take 1 Cap by mouth three (3) times daily. HYDROcodone-acetaminophen 5-325 mg per tablet Commonly known as:  [address]  
take 1 tablet by mouth every 6 to 8 hours if needed for pain  
  
 hydrocortisone 2.5 % rectal cream  
Commonly known as:  ANUSOL-HC Insert  into rectum four (4) times daily. melatonin 3 mg tablet Take  by mouth.  
  
 montelukast 10 mg tablet Commonly known as:  SINGULAIR Take 1 Tab by mouth daily. NAMENDA XR 28 mg capsule Generic drug:  memantine ER One cap once daily. PARoxetine 20 mg tablet Commonly known as:  PAXIL  
  
 raNITIdine 150 mg tablet Commonly known as:  ZANTAC Take 150 mg by mouth two (2) times a day. simethicone 180 mg Cap Take  by mouth. simvastatin 20 mg tablet Commonly known as:  ZOCOR Take 1 Tab by mouth nightly. topiramate 100 mg tablet Commonly known as:  TOPAMAX Take  by mouth two (2) times a day. * traZODone 50 mg tablet Commonly known as:  DESYREL  
  
 * traZODone 100 mg tablet Commonly known as:  [place] Sheryl Take 1 Tab by mouth nightly as needed for Sleep. * Notice: This list has 2 medication(s) that are the same as other medications prescribed for you. Read the directions carefully, and ask your doctor or other care provider to review them with you. Prescriptions Sent to Pharmacy Refills  
 hydrocortisone (ANUSOL-HC) 2.5 % rectal cream 0 Sig: Insert  into rectum four (4) times daily. Class: Normal  
 Pharmacy: [facility] [address] [place] [address] #: [phone] Route: Rectal  
  
Patient Instructions Hemorrhoids: Care Instructions Your Care Instructions Hemorrhoids are enlarged veins that develop in the anal canal. Bleeding during bowel movements, itching, swelling, and rectal pain are the most common symptoms. They can be uncomfortable at times, but hemorrhoids rarely are a serious problem. You can treat most hemorrhoids with simple changes to your diet and bowel habits. These changes include eating more fiber and not straining to pass stools. Most hemorrhoids do not need surgery or other treatment unless they are very large and painful or bleed a lot. Follow-up care is a key part of your treatment and safety. Be sure to make and go to all appointments, and call your doctor if you are having problems. Its also a good idea to know your test results and keep a list of the medicines you take. How can you care for yourself at home?  
· Sit in a few inches of warm water (sitz bath) 3 times a day and after bowel movements. The warm water helps with pain and itching. · Put ice on your anal area several times a day for 10 minutes at a time. Put a thin cloth between the ice and your skin. Follow this by placing a warm, wet towel on the area for another 10 to 20 minutes. · Take pain medicines exactly as directed. ¨ If the doctor gave you a prescription medicine for pain, take it as prescribed. ¨ If you are not taking a prescription pain medicine, ask your doctor if you can take an over-the-counter medicine. · Keep the anal area clean, but be gentle. Use water and a fragrance-free soap, such as [place], or use baby wipes or medicated pads, such as Tucks. · Wear cotton underwear and loose clothing to decrease moisture in the anal area. · Eat more fiber. Include foods such as whole-grain breads and cereals, raw vegetables, raw and dried fruits, and beans. · Drink plenty of fluids, enough so that your urine is light yellow or clear like water. If you have kidney, heart, or liver disease and have to limit fluids, talk with your doctor before you increase the amount of fluids you drink. · Use a stool softener that contains bran or psyllium. You can save money by buying bran or psyllium (available in bulk at most health food stores) and sprinkling it on foods or stirring it into fruit juice. Or you can use a product such as Metamucil or Hydrocil. · Practice healthy bowel habits. ¨ Go to the bathroom as soon as you have the urge. ¨ Avoid straining to pass stools. Relax and give yourself time to let things happen naturally. ¨ Do not hold your breath while passing stools. ¨ Do not read while sitting on the toilet. Get off the toilet as soon as you have finished. · Take your medicines exactly as prescribed. Call your doctor if you think you are having a problem with your medicine. When should you call for help? Call 911 anytime you think you may need emergency care. For example, call if: · You pass maroon or very bloody stools. Call your doctor now or seek immediate medical care if: 
· You have increased pain. · You have increased bleeding. Watch closely for changes in your health, and be sure to contact your doctor if: 
· Your symptoms have not improved after 3 or 4 days. Where can you learn more? Go to http://shell-td.info/. Enter F228 in the search box to learn more about \"Hemorrhoids: Care Instructions. \" Current as of: August 9, 2016 Content Version: 11.1 © 1190-8325 [employer]. Care instructions adapted under license by [employer] (which disclaims liability or warranty for this information). If you have questions about a medical condition or this instruction, always ask your healthcare professional. [address] any warranty or liability for your use of this information. Hemorrhoids: Care Instructions Your Care Instructions Hemorrhoids are enlarged veins that develop in the anal canal. Bleeding during bowel movements, itching, swelling, and rectal pain are the most common symptoms. They can be uncomfortable at times, but hemorrhoids rarely are a serious problem. You can treat most hemorrhoids with simple changes to your diet and bowel habits. These changes include eating more fiber and not straining to pass stools. Most hemorrhoids do not need surgery or other treatment unless they are very large and painful or bleed a lot. Follow-up care is a key part of your treatment and safety. Be sure to make and go to all appointments, and call your doctor if you are having problems. Its also a good idea to know your test results and keep a list of the medicines you take. How can you care for yourself at home? · Sit in a few inches of warm water (sitz bath) 3 times a day and after bowel movements. The warm water helps with pain and itching. · Put ice on your anal area several times a day for 10 minutes at a time. Put a thin cloth between the ice and your skin. Follow this by placing a warm, wet towel on the area for another 10 to 20 minutes. · Take pain medicines exactly as directed. ¨ If the doctor gave you a prescription medicine for pain, take it as prescribed. ¨ If you are not taking a prescription pain medicine, ask your doctor if you can take an over-the-counter medicine. · Keep the anal area clean, but be gentle. Use water and a fragrance-free soap, such as [place], or use baby wipes or medicated pads, such as Tucks. · Wear cotton underwear and loose clothing to decrease moisture in the anal area. · Eat more fiber. Include foods such as whole-grain breads and cereals, raw vegetables, raw and dried fruits, and beans. · Drink plenty of fluids, enough so that your urine is light yellow or clear like water. If you have kidney, heart, or liver disease and have to limit fluids, talk with your doctor before you increase the amount of fluids you drink. · Use a stool softener that contains bran or psyllium. You can save money by buying bran or psyllium (available in bulk at most health food stores) and sprinkling it on foods or stirring it into fruit juice. Or you can use a product such as Metamucil or Hydrocil. · Practice healthy bowel habits. ¨ Go to the bathroom as soon as you have the urge. ¨ Avoid straining to pass stools. Relax and give yourself time to let things happen naturally. ¨ Do not hold your breath while passing stools. ¨ Do not read while sitting on the toilet. Get off the toilet as soon as you have finished. · Take your medicines exactly as prescribed. Call your doctor if you think you are having a problem with your medicine. When should you call for help? Call 911 anytime you think you may need emergency care. For example, call if: 
· You pass maroon or very bloody stools. Call your doctor now or seek immediate medical care if: 
· You have increased pain. · You have increased bleeding. Watch closely for changes in your health, and be sure to contact your doctor if: 
· Your symptoms have not improved after 3 or 4 days. Where can you learn more? Go to http://shell-td.info/. Enter F228 in the search box to learn more about \"Hemorrhoids: Care Instructions. \" Current as of: August 9, 2016 Content Version: 11.1 © 3426-4730 [employer]. Care instructions adapted under license by [employer] (which disclaims liability or warranty for this information). If you have questions about a medical condition or this instruction, always ask your healthcare professional. [address] any warranty or liability for your use of this information. Introducing [facility]! Radha Thorne introduces [email or web address] patient portal. Now you can access parts of your medical record, email your doctor's office, and request medication refills online. 1. In your internet browser, go to [email or web address] 2. Click on the First Time User? Click Here link in the Sign In box. You will see the New Member Sign Up page. 3. Enter your [email or web address] Access Code exactly as it appears below. You will not need to use this code after youve completed the sign-up process. If you do not sign up before the expiration date, you must request a new code. · [email or web address] Access Code: [email or web address] Expires: 4/6/2017 12:14 PM 
 
4. Enter the last four digits of your Social Security Number (xxxx) and Date of Birth (mm/dd/yyyy) as indicated and click Submit. You will be taken to the next sign-up page. 5. Create a Cellwitcht ID. This will be your [email or web address] login ID and cannot be changed, so think of one that is secure and easy to remember. 6. Create a Cellwitcht password. You can change your password at any time. 7. Enter your Password Reset Question and Answer. This can be used at a later time if you forget your password. 8. Enter your e-mail address. You will receive e-mail notification when new information is available in [address] 9. Click Sign Up. You can now view and download portions of your medical record. 10. Click the Download Summary menu link to download a portable copy of your medical information. If you have questions, please visit the Frequently Asked Questions section of the [employer] website. Remember, [employer] is NOT to be used for urgent needs. For medical emergencies, dial 911. Now available from your iPhone and Android! Please provide this summary of care documentation to your next provider. Your primary care clinician is listed as Madi Michael. If you have any questions after today's visit, please call [phone].

## 2017-03-15 NOTE — PROGRESS NOTES
1. Have you been to the ER, urgent care clinic since your last visit? Hospitalized since your last visit? No    2. Have you seen or consulted any other health care providers outside of the [address] [place] [address] since your last visit? Include any pap smears or colon screening.  No   Chief Complaint   Patient presents with    Follow-up     4 month f/u    Referral Request     tae

## 2017-03-15 NOTE — PROGRESS NOTES
Chief Complaint   Patient presents with    Follow-up     4 month f/u    Referral Request     tae    Medication Refill     she is a 49y.o. year old female who presents for evalution. She has a h/o pulmonary and cardiac problems  Her labs also revealed prediabetes  She is here to follow up on that. She was supposed to repeat blood ntests in December but she never did. [place] of the [place] she developed a protrusion from her anus. There has been no bleeding. Feels like a bubble. She was straining a lot to have a BM at that time. She was not able to get green leafy vegetables in the house where she stayed during the month she was there in [address] PmHx, RxHx, FmHx, SocHx, AllgHx and updated and dated in the chart. Patient Active Problem List    Diagnosis    Prediabetes    Seasonal allergic rhinitis    Mild intermittent asthma without complication    Unroofed coronary sinus    Pulmonary embolism ([facility] [address]       Nurse notes were reviewed and copied and are correct  Review of Systems - negative except as listed above in the HPI    Objective:     Vitals:    03/15/17 1339   BP: 122/76   Pulse: 61   Resp: 18   Temp: 98.1 °F (36.7 °C)   TempSrc: Oral   SpO2: 98%   Weight: 167 lb (75.8 kg)   Height: 5' 7\" (1.702 m)       Physical Examination: General appearance - alert, well appearing, and in no distress  Mental status - alert, oriented to person, place, and time  Lymphatics - no palpable lymphadenopathy, no hepatosplenomegaly  Chest - clear to auscultation, no wheezes, rales or rhonchi, symmetric air entry  Heart - normal rate, regular rhythm, normal S1, S2, no murmurs, rubs, clicks or gallops  Extremities - peripheral pulses normal, no pedal edema, no clubbing or cyanosis  Skin - normal coloration and turgor, no rashes, no suspicious skin lesions noted      Assessment/ Plan:   Sumanth Coronado was seen today for follow-up, referral request and medication refill.     Diagnoses and all orders for this visit:    Hemorrhoids, unspecified hemorrhoid type  -     hydrocortisone (ANUSOL-HC) 2.5 % rectal cream; Insert  into rectum four (4) times daily. Blood glucose elevated  Recheck a1c   Acute bilateral low back pain without sciatica  -     gabapentin (NEURONTIN) 300 mg capsule; Take 1 Cap by mouth three (3) times daily. Fibromyalgia  -     gabapentin (NEURONTIN) 300 mg capsule; Take 1 Cap by mouth three (3) times daily. Follow-up Disposition:  Return in about 3 months (around 6/15/2017). ICD-10-CM ICD-9-CM    1. Hemorrhoids, unspecified hemorrhoid type K64.9 455.6 hydrocortisone (ANUSOL-HC) 2.5 % rectal cream   2. Blood glucose elevated R73.9 790.29    3. Acute bilateral low back pain without sciatica M54.5 724.2 gabapentin (NEURONTIN) 300 mg capsule     338.19    4. Fibromyalgia M79.7 729.1 gabapentin (NEURONTIN) 300 mg capsule       I have discussed the diagnosis with the patient and the intended plan as seen in the above orders. The patient has received an after-visit summary and questions were answered concerning future plans. Medication Side Effects and Warnings were discussed with patient: yes  Patient Labs were reviewed and or requested: yes  Patient Past Records were reviewed and or requested: yes        Patient Instructions        Hemorrhoids: Care Instructions  Your Care Instructions    Hemorrhoids are enlarged veins that develop in the anal canal. Bleeding during bowel movements, itching, swelling, and rectal pain are the most common symptoms. They can be uncomfortable at times, but hemorrhoids rarely are a serious problem. You can treat most hemorrhoids with simple changes to your diet and bowel habits. These changes include eating more fiber and not straining to pass stools. Most hemorrhoids do not need surgery or other treatment unless they are very large and painful or bleed a lot. Follow-up care is a key part of your treatment and safety.  Be sure to make and go to all appointments, and call your doctor if you are having problems. Its also a good idea to know your test results and keep a list of the medicines you take. How can you care for yourself at home? · Sit in a few inches of warm water (sitz bath) 3 times a day and after bowel movements. The warm water helps with pain and itching. · Put ice on your anal area several times a day for 10 minutes at a time. Put a thin cloth between the ice and your skin. Follow this by placing a warm, wet towel on the area for another 10 to 20 minutes. · Take pain medicines exactly as directed. ¨ If the doctor gave you a prescription medicine for pain, take it as prescribed. ¨ If you are not taking a prescription pain medicine, ask your doctor if you can take an over-the-counter medicine. · Keep the anal area clean, but be gentle. Use water and a fragrance-free soap, such as [place], or use baby wipes or medicated pads, such as Tucks. · Wear cotton underwear and loose clothing to decrease moisture in the anal area. · Eat more fiber. Include foods such as whole-grain breads and cereals, raw vegetables, raw and dried fruits, and beans. · Drink plenty of fluids, enough so that your urine is light yellow or clear like water. If you have kidney, heart, or liver disease and have to limit fluids, talk with your doctor before you increase the amount of fluids you drink. · Use a stool softener that contains bran or psyllium. You can save money by buying bran or psyllium (available in bulk at most health food stores) and sprinkling it on foods or stirring it into fruit juice. Or you can use a product such as Metamucil or Hydrocil. · Practice healthy bowel habits. ¨ Go to the bathroom as soon as you have the urge. ¨ Avoid straining to pass stools. Relax and give yourself time to let things happen naturally. ¨ Do not hold your breath while passing stools. ¨ Do not read while sitting on the toilet.  Get off the toilet as soon as you have finished. · Take your medicines exactly as prescribed. Call your doctor if you think you are having a problem with your medicine. When should you call for help? Call 911 anytime you think you may need emergency care. For example, call if:  · You pass maroon or very bloody stools. Call your doctor now or seek immediate medical care if:  · You have increased pain. · You have increased bleeding. Watch closely for changes in your health, and be sure to contact your doctor if:  · Your symptoms have not improved after 3 or 4 days. Where can you learn more? Go to http://shellNook Sleep Systemstd.info/. Enter F228 in the search box to learn more about \"Hemorrhoids: Care Instructions. \"  Current as of: August 9, 2016  Content Version: 11.1  © 2254-9483 [employer]. Care instructions adapted under license by [employer] (which disclaims liability or warranty for this information). If you have questions about a medical condition or this instruction, always ask your healthcare professional. [place] [address] any warranty or liability for your use of this information. Hemorrhoids: Care Instructions  Your Care Instructions    Hemorrhoids are enlarged veins that develop in the anal canal. Bleeding during bowel movements, itching, swelling, and rectal pain are the most common symptoms. They can be uncomfortable at times, but hemorrhoids rarely are a serious problem. You can treat most hemorrhoids with simple changes to your diet and bowel habits. These changes include eating more fiber and not straining to pass stools. Most hemorrhoids do not need surgery or other treatment unless they are very large and painful or bleed a lot. Follow-up care is a key part of your treatment and safety. Be sure to make and go to all appointments, and call your doctor if you are having problems.  Its also a good idea to know your test results and keep a list of the medicines you take.  How can you care for yourself at home? · Sit in a few inches of warm water (sitz bath) 3 times a day and after bowel movements. The warm water helps with pain and itching. · Put ice on your anal area several times a day for 10 minutes at a time. Put a thin cloth between the ice and your skin. Follow this by placing a warm, wet towel on the area for another 10 to 20 minutes. · Take pain medicines exactly as directed. ¨ If the doctor gave you a prescription medicine for pain, take it as prescribed. ¨ If you are not taking a prescription pain medicine, ask your doctor if you can take an over-the-counter medicine. · Keep the anal area clean, but be gentle. Use water and a fragrance-free soap, such as Larena Doheny, or use baby wipes or medicated pads, such as Tucks. · Wear cotton underwear and loose clothing to decrease moisture in the anal area. · Eat more fiber. Include foods such as whole-grain breads and cereals, raw vegetables, raw and dried fruits, and beans. · Drink plenty of fluids, enough so that your urine is light yellow or clear like water. If you have kidney, heart, or liver disease and have to limit fluids, talk with your doctor before you increase the amount of fluids you drink. · Use a stool softener that contains bran or psyllium. You can save money by buying bran or psyllium (available in bulk at most health food stores) and sprinkling it on foods or stirring it into fruit juice. Or you can use a product such as Metamucil or Hydrocil. · Practice healthy bowel habits. ¨ Go to the bathroom as soon as you have the urge. ¨ Avoid straining to pass stools. Relax and give yourself time to let things happen naturally. ¨ Do not hold your breath while passing stools. ¨ Do not read while sitting on the toilet. Get off the toilet as soon as you have finished. · Take your medicines exactly as prescribed. Call your doctor if you think you are having a problem with your medicine.   When should you call for help? Call 911 anytime you think you may need emergency care. For example, call if:  · You pass maroon or very bloody stools. Call your doctor now or seek immediate medical care if:  · You have increased pain. · You have increased bleeding. Watch closely for changes in your health, and be sure to contact your doctor if:  · Your symptoms have not improved after 3 or 4 days. Where can you learn more? Go to http://shell-td.info/. Enter F228 in the search box to learn more about \"Hemorrhoids: Care Instructions. \"  Current as of: August 9, 2016  Content Version: 11.1  © 6339-3127 [employer]. Care instructions adapted under license by [employer] (which disclaims liability or warranty for this information). If you have questions about a medical condition or this instruction, always ask your healthcare professional. [place] [address] any warranty or liability for your use of this information.         The patient verbalizes understanding and agrees with the plan of care        Patient has the advanced directives booklet to review

## 2017-03-15 NOTE — PATIENT INSTRUCTIONS

## 2017-03-27 ENCOUNTER — TELEPHONE (OUTPATIENT)
Dept: FAMILY MEDICINE CLINIC | Age: 57
End: 2017-03-27

## 2017-03-27 NOTE — TELEPHONE ENCOUNTER
Patient needs a new referral for Orthopedics to a different Ortho Surgeon please advise so that we can get the correct PA need for this patient    thanks

## 2017-03-27 NOTE — TELEPHONE ENCOUNTER
Pt called on the number on file.  A voice mail was left given the pt information about the referral and for pt to call the clinic back at the pt earliest convenience

## 2017-03-29 RX ORDER — PAROXETINE HYDROCHLORIDE 20 MG/1
20 TABLET, FILM COATED ORAL DAILY
Qty: 30 TAB | Refills: 3 | Status: SHIPPED | OUTPATIENT
Start: 2017-03-29 | End: 2018-03-08

## 2017-04-05 ENCOUNTER — TELEPHONE (OUTPATIENT)
Dept: FAMILY MEDICINE CLINIC | Age: 57
End: 2017-04-05

## 2017-04-05 NOTE — TELEPHONE ENCOUNTER
Patient called for a prescription for a UTI. Advised her that an appointment would have to be made for this. The next available was on 4/13 patient declined appointment. She wanted to leave a message with provider about her issue.

## 2017-04-07 ENCOUNTER — OFFICE VISIT (OUTPATIENT)
Dept: FAMILY MEDICINE CLINIC | Age: 57
End: 2017-04-07

## 2017-04-07 VITALS
OXYGEN SATURATION: 98 % | BODY MASS INDEX: 24.64 KG/M2 | SYSTOLIC BLOOD PRESSURE: 124 MMHG | TEMPERATURE: 98 F | HEART RATE: 55 BPM | DIASTOLIC BLOOD PRESSURE: 75 MMHG | HEIGHT: 67 IN | RESPIRATION RATE: 18 BRPM | WEIGHT: 157 LBS

## 2017-04-07 DIAGNOSIS — R31.9 HEMATURIA: Primary | ICD-10-CM

## 2017-04-07 DIAGNOSIS — K64.9 HEMORRHOIDS, UNSPECIFIED HEMORRHOID TYPE: ICD-10-CM

## 2017-04-07 DIAGNOSIS — Z12.39 BREAST CANCER SCREENING: ICD-10-CM

## 2017-04-07 LAB
BILIRUB UR QL STRIP: NEGATIVE
GLUCOSE UR-MCNC: NEGATIVE MG/DL
KETONES P FAST UR STRIP-MCNC: NEGATIVE MG/DL
PH UR STRIP: 5 [PH] (ref 4.6–8)
PROT UR QL STRIP: NEGATIVE MG/DL
SP GR UR STRIP: 1.02 (ref 1–1.03)
UA UROBILINOGEN AMB POC: NORMAL (ref 0.2–1)
URINALYSIS CLARITY POC: CLEAR
URINALYSIS COLOR POC: YELLOW
URINE BLOOD POC: NORMAL
URINE LEUKOCYTES POC: NEGATIVE
URINE NITRITES POC: NEGATIVE

## 2017-04-07 RX ORDER — SULFAMETHOXAZOLE AND TRIMETHOPRIM 800; 160 MG/1; MG/1
1 TABLET ORAL 2 TIMES DAILY
Qty: 20 TAB | Refills: 0 | Status: SHIPPED | OUTPATIENT
Start: 2017-04-07 | End: 2017-04-17

## 2017-04-07 RX ORDER — HYDROCORTISONE 25 MG/G
CREAM TOPICAL 4 TIMES DAILY
Qty: 30 G | Refills: 0 | Status: SHIPPED | OUTPATIENT
Start: 2017-04-07 | End: 2017-09-27

## 2017-04-07 NOTE — PROGRESS NOTES
Reviewed record in preparation for visit and have necessary documentation  Pt did not bring medication to office visit for review  opportunity was given for questions  Goals that were addressed and/or need to be completed during or after this appointment include   Health Maintenance Due   Topic Date Due    Hepatitis C Screening  1960    BREAST CANCER SCRN MAMMOGRAM  11/25/2012    FOBT Q 1 YEAR AGE 50-75  03/22/2017

## 2017-04-07 NOTE — PROGRESS NOTES
Chief Complaint   Patient presents with    Urinary Pain     burn     she is a 49y.o. year old female who presents for evalution. 1 week of burning with urinating, and odor . No bleeding noted. No fever or chills  No c/o pain in abdomen    Reviewed PmHx, RxHx, FmHx, SocHx, AllgHx and updated and dated in the chart. Patient Active Problem List    Diagnosis    Prediabetes    Seasonal allergic rhinitis    Mild intermittent asthma without complication    Unroofed coronary sinus    Pulmonary embolism ([facility] [address]       Nurse notes were reviewed and copied and are correct  Review of Systems - negative except as listed above in the HPI    Objective:     Vitals:    04/07/17 0920   BP: 124/75   Pulse: (!) 55   Resp: 18   Temp: 98 °F (36.7 °C)   TempSrc: Oral   SpO2: 98%   Weight: 157 lb (71.2 kg)   Height: 5' 7\" (1.702 m)         Physical Examination: General appearance - alert, well appearing, and in no distress  Mental status - alert, oriented to person, place, and time  Chest - clear to auscultation, no wheezes, rales or rhonchi, symmetric air entry  Heart - normal rate, regular rhythm, normal S1, S2, no murmurs, rubs, clicks or gallops  Abdomen - soft, nontender, nondistended, no masses or organomegaly      Assessment/ Plan:   Asmita Sullivan was seen today for urinary pain. Diagnoses and all orders for this visit:    Hematuria  -     CULTURE, URINE  -     trimethoprim-sulfamethoxazole (BACTRIM DS, SEPTRA DS) 160-800 mg per tablet; Take 1 Tab by mouth two (2) times a day for 10 days. Hemorrhoids, unspecified hemorrhoid type  - -     hydrocortisone (ANUSOL-HC) 2.5 % rectal cream; Insert  into rectum four (4) times daily. Other orders      AMB POC URINALYSIS DIP STICK MANUAL W/O MICRO     Follow-up Disposition:  Return in about 6 months (around 10/7/2017). ICD-10-CM ICD-9-CM    1.  Hematuria R31.9 599.70 AMB POC URINALYSIS DIP STICK MANUAL W/O MICRO      CULTURE, URINE      trimethoprim-sulfamethoxazole (BACTRIM DS, SEPTRA DS) 160-800 mg per tablet   2. Hemorrhoids, unspecified hemorrhoid type K64.9 455.6 hydrocortisone (ANUSOL-HC) 2.5 % rectal cream   3. Breast cancer screening Z12.39 V76.10 FARRAH MAMMO BI SCREENING INCL CAD       I have discussed the diagnosis with the patient and the intended plan as seen in the above orders. The patient has received an after-visit summary and questions were answered concerning future plans. Medication Side Effects and Warnings were discussed with patient: yes  Patient Labs were reviewed and or requested: yes  Patient Past Records were reviewed and or requested: yes        There are no Patient Instructions on file for this visit.     The patient verbalizes understanding and agrees with the plan of care        Patient has the advanced directives booklet to review

## 2017-04-07 NOTE — MR AVS SNAPSHOT
Visit Information Date & Time Provider Department Dept. Phone Encounter #  
 4/7/2017  8:45 AM Renae Gustafson MD [address] [place], [place] [id number] Follow-up Instructions Return in about 6 months (around 10/7/2017). Upcoming Health Maintenance Date Due Hepatitis C Screening 1960 BREAST CANCER SCRN MAMMOGRAM 11/25/2012 FOBT Q 1 YEAR AGE 50-75 3/22/2017 PAP AKA CERVICAL CYTOLOGY 12/13/2019 DTaP/Tdap/Td series (2 - Td) 12/13/2026 Allergies as of 4/7/2017  Review Complete On: 4/7/2017 By: Renae Gustafson MD  
  
 Severity Noted Reaction Type Reactions Ambien [Zolpidem]  09/09/2016    Unknown (comments) Causes Migraine Cephalexin  03/17/2016    Rash Morphine  03/17/2016    Rash Motrin [Ibuprofen]  09/09/2016    Swelling Nortriptyline  09/09/2016    Unknown (comments) Bleeding from mouth Protonix [Pantoprazole]  03/17/2016    Rash Vicodin [Hydrocodone-acetaminophen]  09/09/2016    Unknown (comments) Causes Migraines Vitamins For Infusion  09/09/2016    Unknown (comments) Pt stated all vitamins cause her lightheadness and sick Current Immunizations  Reviewed on 6/16/2016 Name Date Influenza High Dose Vaccine PF 8/15/2016 Influenza Vaccine 9/1/2015 Pneumococcal Vaccine (Unspecified Type) 9/1/2015 Tdap 12/13/2016 Not reviewed this visit You Were Diagnosed With   
  
 Codes Comments Hematuria    -  Primary ICD-10-CM: R31.9 ICD-9-CM: 599.70 Hemorrhoids, unspecified hemorrhoid type     ICD-10-CM: K64.9 ICD-9-CM: 704. 6 Vitals BP Pulse Temp Resp Height(growth percentile) Weight(growth percentile) 124/75 (BP 1 Location: Right arm, BP Patient Position: Sitting) (!) 55 98 °F (36.7 °C) (Oral) 18 5' 7\" (1.702 m) 157 lb (71.2 kg) SpO2 BMI OB Status Smoking Status 98% 24.59 kg/m2 Hysterectomy Never Smoker Vitals History BMI and BSA Data Body Mass Index Body Surface Area 24.59 kg/m 2 1.83 m 2 Preferred Pharmacy Pharmacy Name Phone [facility] [address] [phone] Your Updated Medication List  
  
   
This list is accurate as of: 4/7/17  9:58 AM.  Always use your most recent med list.  
  
  
  
  
 albuterol 2.5 mg /3 mL (0.083 %) nebulizer solution Commonly known as:  PROVENTIL VENTOLIN  
by Nebulization route once. ARIPiprazole 2 mg tablet Commonly known as:  ABILIFY Take 2 mg by mouth daily. aspirin 81 mg chewable tablet Take 81 mg by mouth daily. buPROPion  mg tablet Commonly known as:  Seretha Mealing Take 1 Tab by mouth every morning. Cetirizine 10 mg Cap Take  by mouth. DEPAKOTE 250 mg tablet Generic drug:  divalproex DR Take 250 mg by mouth three (3) times daily. donepezil 10 mg tablet Commonly known as:  ARICEPT Take 10 mg by mouth nightly. fluticasone 50 mcg/actuation nasal spray Commonly known as:  Liza Grace 2 Sprays by Both Nostrils route daily. fluticasone-salmeterol 500-50 mcg/dose diskus inhaler Commonly known as:  ADVAIR Take 1 Puff by inhalation every twelve (12) hours. gabapentin 300 mg capsule Commonly known as:  NEURONTIN Take 1 Cap by mouth three (3) times daily. HYDROcodone-acetaminophen 5-325 mg per tablet Commonly known as:  Kriste Landy  
take 1 tablet by mouth every 6 to 8 hours if needed for pain  
  
 hydrocortisone 2.5 % rectal cream  
Commonly known as:  ANUSOL-HC Insert  into rectum four (4) times daily. melatonin 3 mg tablet Take  by mouth.  
  
 montelukast 10 mg tablet Commonly known as:  SINGULAIR Take 1 Tab by mouth daily. NAMENDA XR 28 mg capsule Generic drug:  memantine ER One cap once daily. PARoxetine 20 mg tablet Commonly known as:  PAXIL Take 1 Tab by mouth daily. raNITIdine 150 mg tablet Commonly known as:  ZANTAC Take 150 mg by mouth two (2) times a day. simethicone 180 mg Cap Take  by mouth. simvastatin 20 mg tablet Commonly known as:  ZOCOR Take 1 Tab by mouth nightly. topiramate 100 mg tablet Commonly known as:  TOPAMAX Take  by mouth two (2) times a day. * traZODone 50 mg tablet Commonly known as:  DESYREL  
  
 * traZODone 100 mg tablet Commonly known as:  Cherilynn Meek Take 1 Tab by mouth nightly as needed for Sleep.  
  
 trimethoprim-sulfamethoxazole 160-800 mg per tablet Commonly known as:  BACTRIM DS, SEPTRA DS Take 1 Tab by mouth two (2) times a day for 10 days. * Notice: This list has 2 medication(s) that are the same as other medications prescribed for you. Read the directions carefully, and ask your doctor or other care provider to review them with you. Prescriptions Sent to Pharmacy Refills  
 hydrocortisone (ANUSOL-HC) 2.5 % rectal cream 0 Sig: Insert  into rectum four (4) times daily. Class: Normal  
 Pharmacy: [address] #: [phone] Route: Rectal  
 trimethoprim-sulfamethoxazole (BACTRIM DS, SEPTRA DS) 160-800 mg per tablet 0 Sig: Take 1 Tab by mouth two (2) times a day for 10 days. Class: Normal  
 Pharmacy: [address] Ph #: [phone] Route: Oral  
  
We Performed the Following AMB POC URINALYSIS DIP STICK MANUAL W/O MICRO [51008 CPT(R)] CULTURE, URINE [id number] CPT(R)] Follow-up Instructions Return in about 6 months (around 10/7/2017). Introducing [facility]! [facility]  introduces [employer] patient portal. Now you can access parts of your medical record, email your doctor's office, and request medication refills online. 1. In your internet browser, go to [email or web address] 2. Click on the First Time User? Click Here link in the Sign In box. You will see the New Member Sign Up page. 3. Enter your [employer] Access Code exactly as it appears below. You will not need to use this code after youve completed the sign-up process. If you do not sign up before the expiration date, you must request a new code. · [employer] Access Code: [email or web address] Expires: 7/6/2017  9:58 AM 
 
4. Enter the last four digits of your Social Security Number (xxxx) and Date of Birth (mm/dd/yyyy) as indicated and click Submit. You will be taken to the next sign-up page. 5. Create a [employer] ID. This will be your [employer] login ID and cannot be changed, so think of one that is secure and easy to remember. 6. Create a [employer] password. You can change your password at any time. 7. Enter your Password Reset Question and Answer. This can be used at a later time if you forget your password. 8. Enter your e-mail address. You will receive e-mail notification when new information is available in [address] 9. Click Sign Up. You can now view and download portions of your medical record. 10. Click the Download Summary menu link to download a portable copy of your medical information. If you have questions, please visit the Frequently Asked Questions section of the [employer] website. Remember, [employer] is NOT to be used for urgent needs. For medical emergencies, dial 911. Now available from your iPhone and Android! Please provide this summary of care documentation to your next provider. Your primary care clinician is listed as Natanael Dill. If you have any questions after today's visit, please call [phone].

## 2017-04-09 LAB — BACTERIA UR CULT: NO GROWTH

## 2017-04-11 NOTE — PROGRESS NOTES
The urine test was actually negative for an infection. The test for blood in the urine needs to be repeated in a few weeks.  I want to send a sample to the lab this time instead of the one we do in the office

## 2017-04-11 NOTE — PROGRESS NOTES
Spoke with patient and advised of negative results. Patient verbalized understanding and had no questions at this time. She will call back for appt as she is on her way to surgery.

## 2017-04-14 ENCOUNTER — TELEPHONE (OUTPATIENT)
Dept: FAMILY MEDICINE CLINIC | Age: 57
End: 2017-04-14

## 2017-04-14 NOTE — TELEPHONE ENCOUNTER
----- Message from Jagruti Lewis sent at 4/13/2017  4:26 PM EDT -----  Regarding: /telephone  Pt requesting that we send fax her order for physical therapy to [phone]. Best contact number is [phone].

## 2017-04-17 DIAGNOSIS — F33.2 SEVERE EPISODE OF RECURRENT MAJOR DEPRESSIVE DISORDER, WITHOUT PSYCHOTIC FEATURES (HCC): ICD-10-CM

## 2017-04-17 DIAGNOSIS — F43.10 PTSD (POST-TRAUMATIC STRESS DISORDER): ICD-10-CM

## 2017-04-17 RX ORDER — TRAZODONE HYDROCHLORIDE 100 MG/1
TABLET ORAL
Qty: 25 TAB | Refills: 0 | OUTPATIENT
Start: 2017-04-17

## 2017-04-17 RX ORDER — BUPROPION HYDROCHLORIDE 150 MG/1
TABLET ORAL
Qty: 25 TAB | Refills: 0 | OUTPATIENT
Start: 2017-04-17

## 2017-04-24 ENCOUNTER — TELEPHONE (OUTPATIENT)
Dept: FAMILY MEDICINE CLINIC | Age: 57
End: 2017-04-24

## 2017-04-24 DIAGNOSIS — F33.2 SEVERE EPISODE OF RECURRENT MAJOR DEPRESSIVE DISORDER, WITHOUT PSYCHOTIC FEATURES (HCC): ICD-10-CM

## 2017-04-24 DIAGNOSIS — F43.10 PTSD (POST-TRAUMATIC STRESS DISORDER): ICD-10-CM

## 2017-04-24 RX ORDER — TRAZODONE HYDROCHLORIDE 100 MG/1
100 TABLET ORAL
Qty: 25 TAB | Refills: 0 | OUTPATIENT
Start: 2017-04-24

## 2017-04-24 RX ORDER — BUPROPION HYDROCHLORIDE 150 MG/1
150 TABLET ORAL
Qty: 25 TAB | Refills: 0 | OUTPATIENT
Start: 2017-04-24

## 2017-04-24 NOTE — TELEPHONE ENCOUNTER
Patient has [facility] call and stated that  wanted her to get a sleep study done and something dealing with her frequent headaches with  and there is no such referral request in [facility]?     Please advise thanks

## 2017-04-25 ENCOUNTER — OFFICE VISIT (OUTPATIENT)
Dept: FAMILY MEDICINE CLINIC | Age: 57
End: 2017-04-25

## 2017-04-25 VITALS
BODY MASS INDEX: 25.55 KG/M2 | HEART RATE: 57 BPM | TEMPERATURE: 97.8 F | RESPIRATION RATE: 16 BRPM | SYSTOLIC BLOOD PRESSURE: 122 MMHG | OXYGEN SATURATION: 98 % | HEIGHT: 67 IN | DIASTOLIC BLOOD PRESSURE: 78 MMHG | WEIGHT: 162.8 LBS

## 2017-04-25 DIAGNOSIS — R73.03 PREDIABETES: ICD-10-CM

## 2017-04-25 DIAGNOSIS — F33.9 RECURRENT MAJOR DEPRESSIVE DISORDER, REMISSION STATUS UNSPECIFIED (HCC): ICD-10-CM

## 2017-04-25 DIAGNOSIS — J30.1 SEASONAL ALLERGIC RHINITIS DUE TO POLLEN: ICD-10-CM

## 2017-04-25 DIAGNOSIS — G47.33 OSA (OBSTRUCTIVE SLEEP APNEA): Primary | ICD-10-CM

## 2017-04-25 DIAGNOSIS — R09.82 POST-NASAL DRAINAGE: ICD-10-CM

## 2017-04-25 RX ORDER — OXYCODONE AND ACETAMINOPHEN 7.5; 325 MG/1; MG/1
TABLET ORAL
Refills: 0 | COMMUNITY
Start: 2017-04-11 | End: 2017-06-15

## 2017-04-25 RX ORDER — DIVALPROEX SODIUM 250 MG/1
TABLET, EXTENDED RELEASE ORAL
Refills: 0 | COMMUNITY
Start: 2017-04-17 | End: 2017-06-15

## 2017-04-25 RX ORDER — ENOXAPARIN SODIUM 100 MG/ML
INJECTION SUBCUTANEOUS
Refills: 0 | COMMUNITY
Start: 2017-03-27 | End: 2017-06-15

## 2017-04-25 RX ORDER — FLUTICASONE PROPIONATE 50 MCG
2 SPRAY, SUSPENSION (ML) NASAL DAILY
Qty: 1 BOTTLE | Refills: 3 | Status: SHIPPED | OUTPATIENT
Start: 2017-04-25 | End: 2017-09-27

## 2017-04-25 NOTE — PROGRESS NOTES
Chief Complaint   Patient presents with    Cold Symptoms     Itchy throat, cough, runny nose. Pt state that she is currently taking Zyrtec.     she is a 49y.o. year old female who presents for evalution. She has a h/o sleep apnea. She is  asking for a referral to sleep med . She has a h/o migraines. c/o nasal congestion and itchy throat and ears. Taking zyrtec but not controlling sx at this point. Not using the flonase. The asthma has been controlled   Reviewed PmHx, RxHx, FmHx, SocHx, AllgHx and updated and dated in the chart. Patient Active Problem List    Diagnosis    Prediabetes    Seasonal allergic rhinitis    Mild intermittent asthma without complication    Unroofed coronary sinus    Pulmonary embolism ([facility]       Nurse notes were reviewed and copied and are correct  Review of Systems - negative except as listed above in the HPI    Objective:     Vitals:    04/25/17 0851   BP: 122/78   Pulse: (!) 57   Resp: 16   Temp: 97.8 °F (36.6 °C)   TempSrc: Oral   SpO2: 98%   Weight: 162 lb 12.8 oz (73.8 kg)   Height: 5' 7\" (1.702 m)       Physical Examination: General appearance - alert, well appearing, and in no distress and oriented to person, place, and time  Mental status - alert, oriented to person, place, and time, normal mood, behavior, speech, dress, motor activity, and thought processes  Eyes - pupils equal and reactive, extraocular eye movements intact  Ears - bilateral TM's and external ear canals normal  Nose - normal and patent, no erythema, discharge or polyps  Mouth - mucous membranes moist, pharynx normal without lesions  Neck - supple, no significant adenopathy  Lymphatics - no palpable lymphadenopathy, no hepatosplenomegaly  Chest - clear to auscultation, no wheezes, rales or rhonchi, symmetric air entry  Heart - normal rate, regular rhythm, normal S1, S2, no murmurs, rubs, clicks or gallops      Assessment/ Plan:   Nataliia Prasad was seen today for cold symptoms.     Diagnoses and all orders for this visit:    DANIEL (obstructive sleep apnea)  -     SLEEP MEDICINE REFERRAL  Prediabetes  Next check in june  Seasonal allergic rhinitis due to pollen  Add back th flonase  Recurrent major depressive disorder, remission status unspecified ([facility]  -     REFERRAL TO PSYCHIATRY    Post-nasal drainage  -     fluticasone (FLONASE) 50 mcg/actuation nasal spray; 2 Sprays by Both Nostrils route daily. Follow-up Disposition:  Return in about 3 months (around 7/25/2017). ICD-10-CM ICD-9-CM    1. DANIEL (obstructive sleep apnea) G47.33 327.23 SLEEP MEDICINE REFERRAL   2. Recurrent major depressive disorder, remission status unspecified ([facility]) F33.9 296.30 REFERRAL TO PSYCHIATRY   3. Prediabetes R73.03 790.29    4. Seasonal allergic rhinitis due to pollen J30.1 477.0    5. Post-nasal drainage R09.82 473.9 fluticasone (FLONASE) 50 mcg/actuation nasal spray       I have discussed the diagnosis with the patient and the intended plan as seen in the above orders. The patient has received an after-visit summary and questions were answered concerning future plans. Medication Side Effects and Warnings were discussed with patient: yes  Patient Labs were reviewed and or requested: yes  Patient Past Records were reviewed and or requested: yes        Patient Instructions        Seasonal Allergies: Care Instructions  Your Care Instructions  Allergies occur when your body's defense system (immune system) overreacts to certain substances. The immune system treats a harmless substance as if it were a harmful germ or virus. Many things can cause this to happen. Examples include pollens, medicine, food, dust, animal dander, and mold. Your allergies are seasonal if you have symptoms just at certain times of the year. In that case, you are probably allergic to pollens from certain trees, grasses, or weeds. Allergies can be mild or severe. Over-the-counter allergy medicine may help with some symptoms.  Read and follow all instructions on the label. Managing your allergies is an important part of staying healthy. Your doctor may suggest that you have tests to help find the cause of your allergies. When you know what things trigger your symptoms, you can avoid them. This can prevent allergy symptoms and other health problems. In some cases, immunotherapy might help. For this treatment, you get shots or use pills that have a small amount of certain allergens in them. Your body \"gets used to\" the allergen, so you react less to it over time. This kind of treatment may help prevent or reduce some allergy symptoms. Follow-up care is a key part of your treatment and safety. Be sure to make and go to all appointments, and call your doctor if you are having problems. It's also a good idea to know your test results and keep a list of the medicines you take. How can you care for yourself at home? · Be safe with medicines. Take your medicines exactly as prescribed. Call your doctor if you think you are having a problem with your medicine. · During your allergy season, keep windows closed. If you need to use air-conditioning, change or clean all filters every month. Take a shower and change your clothes after you have been outside. · Stay inside when pollen counts are high. Vacuum once or twice a week. Use a vacuum  with a HEPA filter or a double-thickness filter. When should you call for help? Call 911 anytime you think you may need emergency care. For example, call if:  · You have symptoms of a severe allergic reaction. These may include:  ¨ Sudden raised, red areas (hives) all over your body. ¨ Swelling of the throat, mouth, lips, or tongue. ¨ Trouble breathing. ¨ Passing out (losing consciousness). Or you may feel very lightheaded or suddenly feel weak, confused, or restless. Watch closely for changes in your health, and be sure to contact your doctor if:  · You need help controlling your allergies.   · You have questions about allergy testing. · You do not get better as expected. Where can you learn more? Go to http://shell-td.info/. Enter J912 in the search box to learn more about \"Seasonal Allergies: Care Instructions. \"  Current as of: February 12, 2016  Content Version: 11.2  © 9172-0164 [employer]. Care instructions adapted under license by [employer] (which disclaims liability or warranty for this information). If you have questions about a medical condition or this instruction, always ask your healthcare professional. [address] any warranty or liability for your use of this information. Learning About CPAP for Sleep Apnea  What is CPAP? CPAP is a small machine that you use at home every night while you sleep. It increases air pressure in your throat to keep your airway open. When you have sleep apnea, this can help you sleep better so you feel much better. CPAP stands for \"continuous positive airway pressure. \"  The CPAP machine will have one of the following:  · A mask that covers your nose and mouth  · Prongs that fit into your nose  · A mask that covers your nose only, the most common type. This type is called NCPAP. The N stands for \"nasal.\"  Why is it done? CPAP is usually the best treatment for obstructive sleep apnea. It is the first treatment choice and the most widely used. Your doctor may suggest CPAP if you have:  · Moderate to severe sleep apnea. · Sleep apnea and coronary artery disease (CAD) or heart failure. How does it help? · CPAP can help you have more normal sleep, so you feel less sleepy and more alert during the daytime. · CPAP may help keep heart failure or other heart problems from getting worse. · CPAP may help lower your blood pressure. · If you use CPAP, your bed partner may also sleep better because you are not snoring or restless. What are the side effects?   Some people who use CPAP have:  · A dry or stuffy nose and a sore throat. · Irritated skin on the face. · Sore eyes. · Bloating. If you have any of these problems, work with your doctor to fix them. Here are some things you can try:  · Be sure the mask or nasal prongs fit well. · See if your doctor can adjust the pressure of your CPAP. · If your nose is dry, try a humidifier. · If your nose is runny or stuffy, try decongestant medicine or a steroid nasal spray. Be safe with medicines. Read and follow all instructions on the label. Do not use the medicine longer than the label says. If these things do not help, you might try a different type of machine. Some machines have air pressure that adjusts on its own. Others have air pressures that are different when you breathe in than when you breathe out. This may reduce discomfort caused by too much pressure in your nose. Where can you learn more? Go to http://shell-td.info/. Enter D365 in the search box to learn more about \"Learning About CPAP for Sleep Apnea. \"  Current as of: May 23, 2016  Content Version: 11.2  © 9757-6734 [employer]. Care instructions adapted under license by [employer] (which disclaims liability or warranty for this information). If you have questions about a medical condition or this instruction, always ask your healthcare professional. [place] [address] any warranty or liability for your use of this information.         The patient verbalizes understanding and agrees with the plan of care        Patient has the advanced directives booklet to review

## 2017-04-25 NOTE — PROGRESS NOTES
Chief Complaint   Patient presents with    Cold Symptoms     Itchy throat, cough, runny nose. Pt state that she is currently taking Zyrtec. 1. Have you been to the ER, urgent care clinic since your last visit? Hospitalized since your last visit? No    2. Have you seen or consulted any other health care providers outside of the [address] [place] [address] since your last visit? Include any pap smears or colon screening.  No

## 2017-04-25 NOTE — MR AVS SNAPSHOT
Visit Information Date & Time Provider Department Dept. Phone Encounter #  
 4/25/2017  9:00 AM Nicola Mitchell MD [address] [place] [address] [id number] Upcoming Health Maintenance Date Due Hepatitis C Screening 1960 BREAST CANCER SCRN MAMMOGRAM 11/25/2012 FOBT Q 1 YEAR AGE 50-75 3/22/2017 PAP AKA CERVICAL CYTOLOGY 12/13/2019 DTaP/Tdap/Td series (2 - Td) 12/13/2026 Allergies as of 4/25/2017  Review Complete On: 4/25/2017 By: Nicola Mitchell MD  
  
 Severity Noted Reaction Type Reactions Ambien [Zolpidem]  09/09/2016    Unknown (comments) Causes Migraine Cephalexin  03/17/2016    Rash Morphine  03/17/2016    Rash Motrin [Ibuprofen]  09/09/2016    Swelling Nortriptyline  09/09/2016    Unknown (comments) Bleeding from mouth Protonix [Pantoprazole]  03/17/2016    Rash Vicodin [Hydrocodone-acetaminophen]  09/09/2016    Unknown (comments) Causes Migraines Vitamins For Infusion  09/09/2016    Unknown (comments) Pt stated all vitamins cause her lightheadness and sick Current Immunizations  Reviewed on 6/16/2016 Name Date Influenza High Dose Vaccine PF 8/15/2016 Influenza Vaccine 9/1/2015 Pneumococcal Vaccine (Unspecified Type) 9/1/2015 Tdap 12/13/2016 Not reviewed this visit You Were Diagnosed With   
  
 Codes Comments DANIEL (obstructive sleep apnea)    -  Primary ICD-10-CM: G47.33 
ICD-9-CM: 327.23 Mild single current episode of major depressive disorder (HCC)     ICD-10-CM: F32.0 ICD-9-CM: 296.21 Prediabetes     ICD-10-CM: R73.03 
ICD-9-CM: 790.29 Seasonal allergic rhinitis due to pollen     ICD-10-CM: J30.1 ICD-9-CM: 477.0 Recurrent major depressive disorder, remission status unspecified ([facility]     ICD-10-CM: F33.9 ICD-9-CM: 296.30 Post-nasal drainage     ICD-10-CM: R09.82 ICD-9-CM: 473.9 Vitals BP Pulse Temp Resp Height(growth percentile) Weight(growth percentile) 122/78 (BP 1 Location: Left arm, BP Patient Position: Sitting) (!) 57 97.8 °F (36.6 °C) (Oral) 16 5' 7\" (1.702 m) 162 lb 12.8 oz (73.8 kg) SpO2 BMI OB Status Smoking Status 98% 25.5 kg/m2 Hysterectomy Never Smoker Vitals History BMI and BSA Data Body Mass Index Body Surface Area 25.5 kg/m 2 1.87 m 2 Preferred Pharmacy Pharmacy Name Phone [facility] [address] Plan [phone] Your Updated Medication List  
  
   
This list is accurate as of: 4/25/17  9:18 AM.  Always use your most recent med list.  
  
  
  
  
 albuterol 2.5 mg /3 mL (0.083 %) nebulizer solution Commonly known as:  PROVENTIL VENTOLIN  
by Nebulization route once. ARIPiprazole 2 mg tablet Commonly known as:  ABILIFY Take 2 mg by mouth daily. aspirin 81 mg chewable tablet Take 81 mg by mouth daily. buPROPion  mg tablet Commonly known as:  Jose R Slough Take 1 Tab by mouth every morning. Cetirizine 10 mg Cap Take  by mouth. * DEPAKOTE 250 mg tablet Generic drug:  divalproex DR Take 250 mg by mouth three (3) times daily. * divalproex  mg ER tablet Commonly known as:  DEPAKOTE ER  
take 1 tablet by mouth twice a day  
  
 donepezil 10 mg tablet Commonly known as:  ARICEPT Take 10 mg by mouth nightly. enoxaparin 40 mg/0.4 mL Commonly known as:  LOVENOX  
  
 fluticasone 50 mcg/actuation nasal spray Commonly known as:  Dayan Fray 2 Sprays by Both Nostrils route daily. fluticasone-salmeterol 500-50 mcg/dose diskus inhaler Commonly known as:  ADVAIR Take 1 Puff by inhalation every twelve (12) hours. gabapentin 300 mg capsule Commonly known as:  NEURONTIN Take 1 Cap by mouth three (3) times daily. HYDROcodone-acetaminophen 5-325 mg per tablet Commonly known as:  Karole Dakins  
 take 1 tablet by mouth every 6 to 8 hours if needed for pain  
  
 hydrocortisone 2.5 % rectal cream  
Commonly known as:  ANUSOL-HC Insert  into rectum four (4) times daily. melatonin 3 mg tablet Take  by mouth.  
  
 montelukast 10 mg tablet Commonly known as:  SINGULAIR Take 1 Tab by mouth daily. NAMENDA XR 28 mg capsule Generic drug:  memantine ER One cap once daily. oxyCODONE-acetaminophen 7.5-325 mg per tablet Commonly known as:  PERCOCET 7.5  
take 1 to 2 tablets by mouth every 4 to 6 hours if needed for pain PARoxetine 20 mg tablet Commonly known as:  PAXIL Take 1 Tab by mouth daily. raNITIdine 150 mg tablet Commonly known as:  ZANTAC Take 150 mg by mouth two (2) times a day. simethicone 180 mg Cap Take  by mouth. simvastatin 20 mg tablet Commonly known as:  ZOCOR Take 1 Tab by mouth nightly. topiramate 100 mg tablet Commonly known as:  TOPAMAX Take  by mouth two (2) times a day. * traZODone 50 mg tablet Commonly known as:  DESYREL  
  
 * traZODone 100 mg tablet Commonly known as:  Ayaz Cushing Take 1 Tab by mouth nightly as needed for Sleep. * Notice: This list has 4 medication(s) that are the same as other medications prescribed for you. Read the directions carefully, and ask your doctor or other care provider to review them with you. Prescriptions Sent to Pharmacy Refills  
 fluticasone (FLONASE) 50 mcg/actuation nasal spray 3 Si Sprays by Both Nostrils route daily. Class: Normal  
 Pharmacy: [address] Ph #: [phone] Route: Both Nostrils We Performed the Following REFERRAL TO PSYCHIATRY [REF91 Custom] Comments:  
 Please evaluate patient for continue care depression SLEEP MEDICINE REFERRAL [ZTL513 Custom] Comments:  
 eval and treat for sleep apnea, eval and treat for DANIEL and headaches Referral Information Referral ID Referred By Referred To  
  
 [id number] Alan henderson, [address] [facility], [address] [facility] Mare Larson Phone: [phone] Fax: [phone] Visits Status Start Date End Date 1 New Request 4/25/17 4/25/18 If your referral has a status of pending review or denied, additional information will be sent to support the outcome of this decision. Referral ID Referred By Referred To  
 [id number] Nadeem Wheatleyon, [address] [facility], [address] Phone: [phone] Fax: [phone] Visits Status Start Date End Date 1 New Request 4/25/17 4/25/18 If your referral has a status of pending review or denied, additional information will be sent to support the outcome of this decision. Patient Instructions Seasonal Allergies: Care Instructions Your Care Instructions Allergies occur when your body's defense system (immune system) overreacts to certain substances. The immune system treats a harmless substance as if it were a harmful germ or virus. Many things can cause this to happen. Examples include pollens, medicine, food, dust, animal dander, and mold. Your allergies are seasonal if you have symptoms just at certain times of the year. In that case, you are probably allergic to pollens from certain trees, grasses, or weeds. Allergies can be mild or severe. Over-the-counter allergy medicine may help with some symptoms. Read and follow all instructions on the label. Managing your allergies is an important part of staying healthy. Your doctor may suggest that you have tests to help find the cause of your allergies. When you know what things trigger your symptoms, you can avoid them. This can prevent allergy symptoms and other health problems. In some cases, immunotherapy might help.  For this treatment, you get shots or use pills that have a small amount of certain allergens in them. Your body \"gets used to\" the allergen, so you react less to it over time. This kind of treatment may help prevent or reduce some allergy symptoms. Follow-up care is a key part of your treatment and safety. Be sure to make and go to all appointments, and call your doctor if you are having problems. It's also a good idea to know your test results and keep a list of the medicines you take. How can you care for yourself at home? · Be safe with medicines. Take your medicines exactly as prescribed. Call your doctor if you think you are having a problem with your medicine. · During your allergy season, keep windows closed. If you need to use air-conditioning, change or clean all filters every month. Take a shower and change your clothes after you have been outside. · Stay inside when pollen counts are high. Vacuum once or twice a week. Use a vacuum  with a HEPA filter or a double-thickness filter. When should you call for help? Call 911 anytime you think you may need emergency care. For example, call if: 
· You have symptoms of a severe allergic reaction. These may include: 
¨ Sudden raised, red areas (hives) all over your body. ¨ Swelling of the throat, mouth, lips, or tongue. ¨ Trouble breathing. ¨ Passing out (losing consciousness). Or you may feel very lightheaded or suddenly feel weak, confused, or restless. Watch closely for changes in your health, and be sure to contact your doctor if: 
· You need help controlling your allergies. · You have questions about allergy testing. · You do not get better as expected. Where can you learn more? Go to http://shell-td.info/. Enter J912 in the search box to learn more about \"Seasonal Allergies: Care Instructions. \" Current as of: February 12, 2016 Content Version: 11.2 © 0235-4524 [employer].  Care instructions adapted under license by [address] (which disclaims liability or warranty for this information). If you have questions about a medical condition or this instruction, always ask your healthcare professional. [address] any warranty or liability for your use of this information. Learning About CPAP for Sleep Apnea What is CPAP? CPAP is a small machine that you use at home every night while you sleep. It increases air pressure in your throat to keep your airway open. When you have sleep apnea, this can help you sleep better so you feel much better. CPAP stands for \"continuous positive airway pressure. \" The CPAP machine will have one of the following: · A mask that covers your nose and mouth · Prongs that fit into your nose · A mask that covers your nose only, the most common type. This type is called NCPAP. The N stands for \"nasal.\" Why is it done? CPAP is usually the best treatment for obstructive sleep apnea. It is the first treatment choice and the most widely used. Your doctor may suggest CPAP if you have: · Moderate to severe sleep apnea. · Sleep apnea and coronary artery disease (CAD) or heart failure. How does it help? · CPAP can help you have more normal sleep, so you feel less sleepy and more alert during the daytime. · CPAP may help keep heart failure or other heart problems from getting worse. · CPAP may help lower your blood pressure. · If you use CPAP, your bed partner may also sleep better because you are not snoring or restless. What are the side effects? Some people who use CPAP have: · A dry or stuffy nose and a sore throat. · Irritated skin on the face. · Sore eyes. · Bloating. If you have any of these problems, work with your doctor to fix them. Here are some things you can try: · Be sure the mask or nasal prongs fit well. · See if your doctor can adjust the pressure of your CPAP. · If your nose is dry, try a humidifier. · If your nose is runny or stuffy, try decongestant medicine or a steroid nasal spray. Be safe with medicines. Read and follow all instructions on the label. Do not use the medicine longer than the label says. If these things do not help, you might try a different type of machine. Some machines have air pressure that adjusts on its own. Others have air pressures that are different when you breathe in than when you breathe out. This may reduce discomfort caused by too much pressure in your nose. Where can you learn more? Go to http://shell-dt.info/. Enter F723 in the search box to learn more about \"Learning About CPAP for Sleep Apnea. \" Current as of: May 23, 2016 Content Version: 11.2 © 5796-2974 [employer]. Care instructions adapted under license by [employer] (which disclaims liability or warranty for this information). If you have questions about a medical condition or this instruction, always ask your healthcare professional. [address] any warranty or liability for your use of this information. Introducing [facility]! Lino Garcia introduces Torbit patient portal. Now you can access parts of your medical record, email your doctor's office, and request medication refills online. 1. In your internet browser, go to [email or web address] 2. Click on the First Time User? Click Here link in the Sign In box. You will see the New Member Sign Up page. 3. Enter your Torbit Access Code exactly as it appears below. You will not need to use this code after youve completed the sign-up process. If you do not sign up before the expiration date, you must request a new code. · Torbit Access Code: [email or web address] Expires: 7/6/2017  9:58 AM 
 
4. Enter the last four digits of your Social Security Number (xxxx) and Date of Birth (mm/dd/yyyy) as indicated and click Submit.  You will be taken to the next sign-up page. 5. Create a Blinkiverse ID. This will be your Blinkiverse login ID and cannot be changed, so think of one that is secure and easy to remember. 6. Create a Blinkiverse password. You can change your password at any time. 7. Enter your Password Reset Question and Answer. This can be used at a later time if you forget your password. 8. Enter your e-mail address. You will receive e-mail notification when new information is available in [address]. 9. Click Sign Up. You can now view and download portions of your medical record. 10. Click the Download Summary menu link to download a portable copy of your medical information. If you have questions, please visit the Frequently Asked Questions section of the Blinkiverse website. Remember, Blinkiverse is NOT to be used for urgent needs. For medical emergencies, dial 911. Now available from your iPhone and Android! Please provide this summary of care documentation to your next provider. Your primary care clinician is listed as Sallye Felty. If you have any questions after today's visit, please call [phone].

## 2017-04-25 NOTE — PATIENT INSTRUCTIONS
Seasonal Allergies: Care Instructions  Your Care Instructions  Allergies occur when your body's defense system (immune system) overreacts to certain substances. The immune system treats a harmless substance as if it were a harmful germ or virus. Many things can cause this to happen. Examples include pollens, medicine, food, dust, animal dander, and mold. Your allergies are seasonal if you have symptoms just at certain times of the year. In that case, you are probably allergic to pollens from certain trees, grasses, or weeds. Allergies can be mild or severe. Over-the-counter allergy medicine may help with some symptoms. Read and follow all instructions on the label. Managing your allergies is an important part of staying healthy. Your doctor may suggest that you have tests to help find the cause of your allergies. When you know what things trigger your symptoms, you can avoid them. This can prevent allergy symptoms and other health problems. In some cases, immunotherapy might help. For this treatment, you get shots or use pills that have a small amount of certain allergens in them. Your body \"gets used to\" the allergen, so you react less to it over time. This kind of treatment may help prevent or reduce some allergy symptoms. Follow-up care is a key part of your treatment and safety. Be sure to make and go to all appointments, and call your doctor if you are having problems. It's also a good idea to know your test results and keep a list of the medicines you take. How can you care for yourself at home? · Be safe with medicines. Take your medicines exactly as prescribed. Call your doctor if you think you are having a problem with your medicine. · During your allergy season, keep windows closed. If you need to use air-conditioning, change or clean all filters every month. Take a shower and change your clothes after you have been outside. · Stay inside when pollen counts are high.  Vacuum once or twice a week. Use a vacuum  with a HEPA filter or a double-thickness filter. When should you call for help? Call 911 anytime you think you may need emergency care. For example, call if:  · You have symptoms of a severe allergic reaction. These may include:  ¨ Sudden raised, red areas (hives) all over your body. ¨ Swelling of the throat, mouth, lips, or tongue. ¨ Trouble breathing. ¨ Passing out (losing consciousness). Or you may feel very lightheaded or suddenly feel weak, confused, or restless. Watch closely for changes in your health, and be sure to contact your doctor if:  · You need help controlling your allergies. · You have questions about allergy testing. · You do not get better as expected. Where can you learn more? Go to http://shellRustoriatd.info/. Enter J912 in the search box to learn more about \"Seasonal Allergies: Care Instructions. \"  Current as of: February 12, 2016  Content Version: 11.2  © 1851-8535 [employer]. Care instructions adapted under license by [employer] (which disclaims liability or warranty for this information). If you have questions about a medical condition or this instruction, always ask your healthcare professional. [address] any warranty or liability for your use of this information. Learning About CPAP for Sleep Apnea  What is CPAP? CPAP is a small machine that you use at home every night while you sleep. It increases air pressure in your throat to keep your airway open. When you have sleep apnea, this can help you sleep better so you feel much better. CPAP stands for \"continuous positive airway pressure. \"  The CPAP machine will have one of the following:  · A mask that covers your nose and mouth  · Prongs that fit into your nose  · A mask that covers your nose only, the most common type. This type is called NCPAP. The N stands for \"nasal.\"  Why is it done?   CPAP is usually the best treatment for obstructive sleep apnea. It is the first treatment choice and the most widely used. Your doctor may suggest CPAP if you have:  · Moderate to severe sleep apnea. · Sleep apnea and coronary artery disease (CAD) or heart failure. How does it help? · CPAP can help you have more normal sleep, so you feel less sleepy and more alert during the daytime. · CPAP may help keep heart failure or other heart problems from getting worse. · CPAP may help lower your blood pressure. · If you use CPAP, your bed partner may also sleep better because you are not snoring or restless. What are the side effects? Some people who use CPAP have:  · A dry or stuffy nose and a sore throat. · Irritated skin on the face. · Sore eyes. · Bloating. If you have any of these problems, work with your doctor to fix them. Here are some things you can try:  · Be sure the mask or nasal prongs fit well. · See if your doctor can adjust the pressure of your CPAP. · If your nose is dry, try a humidifier. · If your nose is runny or stuffy, try decongestant medicine or a steroid nasal spray. Be safe with medicines. Read and follow all instructions on the label. Do not use the medicine longer than the label says. If these things do not help, you might try a different type of machine. Some machines have air pressure that adjusts on its own. Others have air pressures that are different when you breathe in than when you breathe out. This may reduce discomfort caused by too much pressure in your nose. Where can you learn more? Go to http://shell-td.info/. Enter V233 in the search box to learn more about \"Learning About CPAP for Sleep Apnea. \"  Current as of: May 23, 2016  Content Version: 11.2  © 7455-8540 [employer]. Care instructions adapted under license by [employer] (which disclaims liability or warranty for this information).  If you have questions about a medical condition or this instruction, always ask your healthcare professional. [place] [address] any warranty or liability for your use of this information.

## 2017-05-25 ENCOUNTER — OFFICE VISIT (OUTPATIENT)
Dept: SLEEP MEDICINE | Age: 57
End: 2017-05-25

## 2017-05-25 VITALS
HEIGHT: 67 IN | WEIGHT: 158 LBS | BODY MASS INDEX: 24.8 KG/M2 | SYSTOLIC BLOOD PRESSURE: 120 MMHG | OXYGEN SATURATION: 99 % | HEART RATE: 61 BPM | DIASTOLIC BLOOD PRESSURE: 74 MMHG

## 2017-05-25 DIAGNOSIS — F51.05 HYPOSOMNIA, INSOMNIA OR SLEEPLESSNESS ASSOCIATED WITH ANXIETY: ICD-10-CM

## 2017-05-25 DIAGNOSIS — F41.9 HYPOSOMNIA, INSOMNIA OR SLEEPLESSNESS ASSOCIATED WITH ANXIETY: ICD-10-CM

## 2017-05-25 DIAGNOSIS — G47.33 OSA (OBSTRUCTIVE SLEEP APNEA): Primary | ICD-10-CM

## 2017-05-25 NOTE — PROGRESS NOTES
[address] [place]. [place], [address]  Tel.  [phone]  Fax. [address] [facility] [address]  [place], [address] [facility]  Tel.  [phone]  Fax. [phone] [address]  Tel.  [phone]  Fax. [phone]       Subjective:      Shanna Long is a 49 y.o. female who I am asked to see in consultation for evaluation and management of sleep apnea. She was diagnosed with sleep apnea several years ago. She is not using her device regularly. She complains of awakening in the middle of the night because of SOB associated with tossing and turning. Symptoms began several months ago, gradually worsening since that time. She usually can fall asleep in 30 minutes. Family or house members note snoring, choking. She denies completely or partially paralyzed while falling asleep or waking up. There are minimal  mask comfort problems. The following problems are noted with PAP:     Drowsiness no Problems exhaling yes   Snoring no Forget to put on no   Mask Comfortable yes Can't fall asleep no   Dry Mouth no Mask falls off no   Air Leaking no Frequent awakenings no     Veliscia E Hildegarde Poor does wake up frequently at night. She is bothered by waking up too early and left unable to get back to sleep. She actually sleeps about 6 hours at night and wakes up about 3 times during the night. She does not work shifts: Michael Arreguin indicates she  (sometimes) get too little sleep at night. Her bedtime is 2300. She awakens at 0700. She does not take naps. She takes   naps a week lasting  . She has the following observed behaviors: Loud snoring, Twitching of legs or feet;  . Other remarks: waking with gasp    [place] Sleepiness Score: 6   which reflect moderate daytime drowsiness.     Allergies   Allergen Reactions    Ambien [Zolpidem] Unknown (comments)     Causes Migraine    Cephalexin Rash    Morphine Rash    Motrin [Ibuprofen] Swelling    Nortriptyline Unknown (comments)     Bleeding from mouth    Protonix [Pantoprazole] Rash    Vicodin [Hydrocodone-Acetaminophen] Unknown (comments)     Causes Migraines    Vitamins For Infusion Unknown (comments)     Pt stated all vitamins cause her lightheadness and sick         Current Outpatient Prescriptions:     divalproex ER (DEPAKOTE ER) 250 mg ER tablet, take 1 tablet by mouth twice a day, Disp: , Rfl: 0    enoxaparin (LOVENOX) 40 mg/0.4 mL, , Disp: , Rfl: 0    fluticasone (FLONASE) 50 mcg/actuation nasal spray, 2 Sprays by Both Nostrils route daily. , Disp: 1 Bottle, Rfl: 3    hydrocortisone (ANUSOL-HC) 2.5 % rectal cream, Insert  into rectum four (4) times daily. , Disp: 30 g, Rfl: 0    PARoxetine (PAXIL) 20 mg tablet, Take 1 Tab by mouth daily. , Disp: 30 Tab, Rfl: 3    traZODone (DESYREL) 50 mg tablet, , Disp: , Rfl: 0    gabapentin (NEURONTIN) 300 mg capsule, Take 1 Cap by mouth three (3) times daily. , Disp: 90 Cap, Rfl: 1    traZODone (DESYREL) 100 mg tablet, Take 1 Tab by mouth nightly as needed for Sleep., Disp: 25 Tab, Rfl: 0    buPROPion XL (WELLBUTRIN XL) 150 mg tablet, Take 1 Tab by mouth every morning., Disp: 25 Tab, Rfl: 0    simvastatin (ZOCOR) 20 mg tablet, Take 1 Tab by mouth nightly., Disp: 30 Tab, Rfl: 5    divalproex DR (DEPAKOTE) 250 mg tablet, Take 250 mg by mouth three (3) times daily. , Disp: , Rfl:     montelukast (SINGULAIR) 10 mg tablet, Take 1 Tab by mouth daily. , Disp: 30 Tab, Rfl: 11    NAMENDA XR 28 mg capsule, One cap once daily. , Disp: , Rfl: 0    aspirin 81 mg chewable tablet, Take 81 mg by mouth daily. , Disp: , Rfl:     Cetirizine 10 mg cap, Take  by mouth., Disp: , Rfl:     fluticasone-salmeterol (ADVAIR) 500-50 mcg/dose diskus inhaler, Take 1 Puff by inhalation every twelve (12) hours. , Disp: , Rfl:     albuterol (PROVENTIL VENTOLIN) 2.5 mg /3 mL (0.083 %) nebulizer solution, by Nebulization route once., Disp: , Rfl:     ranitidine (ZANTAC) 150 mg tablet, Take 150 mg by mouth two (2) times a day., Disp: , Rfl:     topiramate (TOPAMAX) 100 mg tablet, Take  by mouth two (2) times a day., Disp: , Rfl:     donepezil (ARICEPT) 10 mg tablet, Take 10 mg by mouth nightly., Disp: , Rfl:     oxyCODONE-acetaminophen (PERCOCET 7.5) 7.5-325 mg per tablet, take 1 to 2 tablets by mouth every 4 to 6 hours if needed for pain, Disp: , Rfl: 0    simethicone 180 mg cap, Take  by mouth., Disp: , Rfl:     ARIPiprazole (ABILIFY) 2 mg tablet, Take 2 mg by mouth daily. , Disp: , Rfl:     melatonin 3 mg tablet, Take  by mouth., Disp: , Rfl:      She  has a past medical history of Anal fistula; Asthma; Back ache; CAD (coronary artery disease); Cholesterol blood decreased; Dementia; Depression; Deviated septum; Diabetes ([address] Fibromyalgia; Frequent headaches; Heart valve disease; Herniated nucleus pulposus, L5-S1; Migraine; Protein S deficiency ([address] Pulmonary emboli ([address] Pulmonary hypertension ([address] Restless leg syndrome; RLS (restless legs syndrome); Sickle cell trait ([address] Sleep apnea; Stroke [facility]); Upper airway resistance syndrome; and Vaginal fistula. She also has no past medical history of Chronic obstructive pulmonary disease ([address] She  has a past surgical history that includes cardiac surg procedure unlist (9/23/99). She family history includes Diabetes in her mother; Heart Disease in her mother; Hypertension in her brother and mother; Sickle Cell Anemia in her sister; Stroke in her maternal aunt. She  reports that she has never smoked. She has never used smokeless tobacco. She reports that she does not drink alcohol or use illicit drugs. Review of Systems:  Constitutional:  No significant weight loss or weight gain. Eyes:  No blurred vision. CVS:  No significant chest pain  Pulm:  No significant shortness of breath  GI:  No significant nausea or vomiting  :  No significant nocturia  Musculoskeletal:  No significant joint pain at night  Skin:  No significant rashes  Neuro:   No significant dizziness   Psych:  No active mood issues    Sleep Review of Systems: notable for no difficulty falling asleep; infrequent awakenings at night;  regular dreaming noted; no nightmares ; no early morning headaches ; no memory problems; no concentration issues; no history of any automobile or occupational accidents due to daytime drowsiness. Objective:     Visit Vitals    /74    Pulse 61    Ht 5' 7\" (1.702 m)    Wt 158 lb (71.7 kg)    SpO2 99%    BMI 24.75 kg/m2         General:   Not in acute distress   Eyes:  Anicteric sclerae, no obvious strabismus   Nose:  No obvious nasal septum deviation    Oropharynx:   Class 3 oropharyngeal outlet, thick tongue base,low-lying soft palate, narrow tonsilo-pharyngeal pilars   Tonsils:   tonsils are unappreciated   Neck:   Neck circ. in \"inches\": 13.5; midline trachea   Chest/Lungs:  Equal lung expansion, clear on auscultation    CVS:  Normal rate, regular rhythm; no JVD   Skin:  Warm to touch; no obvious rashes   Neuro:  No focal deficits ; no obvious tremor    Psych:  Normal affect,  normal countenance;          Assessment:       ICD-10-CM ICD-9-CM    1. DANIEL (obstructive sleep apnea) G47.33 327.23 AMB SUPPLY ORDER   2. Hyposomnia, insomnia or sleeplessness associated with anxiety F51.05 293.84     F41.9 327.02        Plan:     * She was provided information on sleep apnea including coresponding risk factors and the importance of proper treatment. * She was likewise counseled on weight management and lateral sleeping posture (with the use of bed pillows or wedge) which may reduce sleep apnea events. Caution with hypnotics, alcohol, and sedating pain medications as these can worsen sleep apnea. * We've requested previous sleep records and studies. Orders Placed This Encounter    AMB SUPPLY ORDER     Positive Airway Pressure CPAP 4-12 cmH2O Flex 2 for 2 weeks then  * AutoCheck setting enabled.   * Respironics device with heated tube as needed  PAP Mask patient preference,heated humidifer, tubing, filters (all sleep supplies) as needed    Wireless modem enrollment with privileges to change therapy remotely - indefinite. Length of Need = 99 months    Neo Yates M.D.  (electronically signed)  Diplomate in Sleep Medicine, [facility]     Follow-up Disposition:  Return in about 2 months (around 7/25/2017) for PAP adherence assessment. Counseling was provided regarding the importance of regular PAP use and on proper sleep hygiene and safe driving. * The problem of recurrent insomnia is discussed. Avoidance of caffeine sources is strongly encouraged. Sleep hygiene issues are reviewed. The use of sedative hypnotics for temporary relief may be appropriate for a short 2-3 week course; we discussed the addictive nature of these drugs and counseled her on stress mangement. * Insomnia management as per psychiatry  Thank you for allowing us to participate in your patient's medical care.     Neo Yates M.D.  (electronically signed)  [facility]

## 2017-05-25 NOTE — MR AVS SNAPSHOT
Visit Information Date & Time Provider Department Dept. Phone Encounter #  
 5/25/2017  2:00 PM Tiff Triana, [address] [phone] [id number] Follow-up Instructions Return in about 2 months (around 7/25/2017) for PAP adherence assessment. Follow-up and Disposition History Upcoming Health Maintenance Date Due Hepatitis C Screening 1960 BREAST CANCER SCRN MAMMOGRAM 11/25/2012 FOBT Q 1 YEAR AGE 50-75 3/22/2017 INFLUENZA AGE 9 TO ADULT 8/1/2017 PAP AKA CERVICAL CYTOLOGY 12/13/2019 DTaP/Tdap/Td series (2 - Td) 12/13/2026 Allergies as of 5/25/2017  Review Complete On: 5/25/2017 By: Tiff Triana MD  
  
 Severity Noted Reaction Type Reactions Ambien [Zolpidem]  09/09/2016    Unknown (comments) Causes Migraine Cephalexin  03/17/2016    Rash Morphine  03/17/2016    Rash Motrin [Ibuprofen]  09/09/2016    Swelling Nortriptyline  09/09/2016    Unknown (comments) Bleeding from mouth Protonix [Pantoprazole]  03/17/2016    Rash Vicodin [Hydrocodone-acetaminophen]  09/09/2016    Unknown (comments) Causes Migraines Vitamins For Infusion  09/09/2016    Unknown (comments) Pt stated all vitamins cause her lightheadness and sick Current Immunizations  Reviewed on 6/16/2016 Name Date Influenza High Dose Vaccine PF 8/15/2016 Influenza Vaccine 9/1/2015 Pneumococcal Vaccine (Unspecified Type) 9/1/2015 Tdap 12/13/2016 Not reviewed this visit You Were Diagnosed With   
  
 Codes Comments DANIEL (obstructive sleep apnea)    -  Primary ICD-10-CM: G47.33 
ICD-9-CM: 327.23 Hyposomnia, insomnia or sleeplessness associated with anxiety     ICD-10-CM: F51.05, F41.9 ICD-9-CM: 293.84, 327.02 Vitals BP Pulse Height(growth percentile) Weight(growth percentile) SpO2 BMI  
 120/74 61 5' 7\" (1.702 m) 158 lb (71.7 kg) 99% 24.75 kg/m2 OB Status Smoking Status Hysterectomy Never Smoker Vitals History BMI and BSA Data Body Mass Index Body Surface Area 24.75 kg/m 2 1.84 m 2 Preferred Pharmacy Pharmacy Name Phone [facility] [phone] Your Updated Medication List  
  
   
This list is accurate as of: 5/25/17  2:23 PM.  Always use your most recent med list.  
  
  
  
  
 albuterol 2.5 mg /3 mL (0.083 %) nebulizer solution Commonly known as:  PROVENTIL VENTOLIN  
by Nebulization route once. ARIPiprazole 2 mg tablet Commonly known as:  ABILIFY Take 2 mg by mouth daily. aspirin 81 mg chewable tablet Take 81 mg by mouth daily. buPROPion  mg tablet Commonly known as:  Seretha Mealing Take 1 Tab by mouth every morning. Cetirizine 10 mg Cap Take  by mouth. * DEPAKOTE 250 mg tablet Generic drug:  divalproex DR Take 250 mg by mouth three (3) times daily. * divalproex  mg ER tablet Commonly known as:  DEPAKOTE ER  
take 1 tablet by mouth twice a day  
  
 donepezil 10 mg tablet Commonly known as:  ARICEPT Take 10 mg by mouth nightly. enoxaparin 40 mg/0.4 mL Commonly known as:  LOVENOX  
  
 fluticasone 50 mcg/actuation nasal spray Commonly known as:  Liza Grace 2 Sprays by Both Nostrils route daily. fluticasone-salmeterol 500-50 mcg/dose diskus inhaler Commonly known as:  ADVAIR Take 1 Puff by inhalation every twelve (12) hours. gabapentin 300 mg capsule Commonly known as:  NEURONTIN Take 1 Cap by mouth three (3) times daily. hydrocortisone 2.5 % rectal cream  
Commonly known as:  ANUSOL-HC Insert  into rectum four (4) times daily. melatonin 3 mg tablet Take  by mouth.  
  
 montelukast 10 mg tablet Commonly known as:  SINGULAIR Take 1 Tab by mouth daily. NAMENDA XR 28 mg capsule Generic drug:  memantine ER One cap once daily. oxyCODONE-acetaminophen 7.5-325 mg per tablet Commonly known as:  PERCOCET 7.5  
take 1 to 2 tablets by mouth every 4 to 6 hours if needed for pain PARoxetine 20 mg tablet Commonly known as:  PAXIL Take 1 Tab by mouth daily. raNITIdine 150 mg tablet Commonly known as:  ZANTAC Take 150 mg by mouth two (2) times a day. simethicone 180 mg Cap Take  by mouth. simvastatin 20 mg tablet Commonly known as:  ZOCOR Take 1 Tab by mouth nightly. topiramate 100 mg tablet Commonly known as:  TOPAMAX Take  by mouth two (2) times a day. * traZODone 50 mg tablet Commonly known as:  DESYREL  
  
 * traZODone 100 mg tablet Commonly known as:  Chaz Stager Take 1 Tab by mouth nightly as needed for Sleep. * Notice: This list has 4 medication(s) that are the same as other medications prescribed for you. Read the directions carefully, and ask your doctor or other care provider to review them with you. We Performed the Following AMB SUPPLY ORDER [[id number] Custom] Comments:  
 Positive Airway Pressure CPAP 4-12 cmH2O Flex 2 for 2 weeks then * AutoCheck setting enabled. * Respironics device with heated tube as needed PAP Mask patient preference,heated humidifer, tubing, filters (all sleep supplies) as needed Wireless modem enrollment with privileges to change therapy remotely - indefinite. Length of Need = 99 months Angelic Beach M.D.  (electronically signed) Diplomate in Sleep Medicine, [employer] Follow-up Instructions Return in about 2 months (around 7/25/2017) for PAP adherence assessment. Patient Instructions 217 [facility]., [place]. 1668 [facility], [facility] Tel.  [phone] Fax. 100 [facility] [address] Tel.  [phone] Fax. [phone] [address] [place] [address] [place] [address] Tel.  [phone] Fax. [phone] Learning About CPAP for Sleep Apnea What is CPAP? CPAP is a small machine that you use at home every night while you sleep. It increases air pressure in your throat to keep your airway open. When you have sleep apnea, this can help you sleep better so you feel much better. CPAP stands for \"continuous positive airway pressure. \" The CPAP machine will have one of the following: · A mask that covers your nose and mouth · Prongs that fit into your nose · A mask that covers your nose only, the most common type. This type is called NCPAP. The N stands for \"nasal.\" Why is it done? CPAP is usually the best treatment for obstructive sleep apnea. It is the first treatment choice and the most widely used. Your doctor may suggest CPAP if you have: · Moderate to severe sleep apnea. · Sleep apnea and coronary artery disease (CAD) or heart failure. How does it help? · CPAP can help you have more normal sleep, so you feel less sleepy and more alert during the daytime. · CPAP may help keep heart failure or other heart problems from getting worse. · NCPAP may help lower your blood pressure. · If you use CPAP, your bed partner may also sleep better because you are not snoring or restless. What are the side effects? Some people who use CPAP have: · A dry or stuffy nose and a sore throat. · Irritated skin on the face. · Sore eyes. · Bloating. If you have any of these problems, work with your doctor to fix them. Here are some things you can try: · Be sure the mask or nasal prongs fit well. · See if your doctor can adjust the pressure of your CPAP. · If your nose is dry, try a humidifier. · If your nose is runny or stuffy, try decongestant medicine or a steroid nasal spray. If these things do not help, you might try a different type of machine. Some machines have air pressure that adjusts on its own.  Others have air pressures that are different when you breathe in than when you breathe out. This may reduce discomfort caused by too much pressure in your nose. Where can you learn more? Go to TripOvation.be Enter E746 in the search box to learn more about \"Learning About CPAP for Sleep Apnea. \"  
© 0826-3105 Healthwise, Incorporated. Care instructions adapted under license by [facility] (which disclaims liability or warranty for this information). This care instruction is for use with your licensed healthcare professional. If you have questions about a medical condition or this instruction, always ask your healthcare professional. [address] any warranty or liability for your use of this information. Content Version: 9.5.02720; Last Revised: January 11, 2010 PROPER SLEEP HYGIENE What to avoid · Do not have drinks with caffeine, such as coffee or black tea, for 8 hours before bed. · Do not smoke or use other types of tobacco near bedtime. Nicotine is a stimulant and can keep you awake. · Avoid drinking alcohol late in the evening, because it can cause you to wake in the middle of the night. · Do not eat a big meal close to bedtime. If you are hungry, eat a light snack. · Do not drink a lot of water close to bedtime, because the need to urinate may wake you up during the night. · Do not read or watch TV in bed. Use the bed only for sleeping and sexual activity. What to try · Go to bed at the same time every night, and wake up at the same time every morning. Do not take naps during the day. · Keep your bedroom quiet, dark, and cool. · Get regular exercise, but not within 3 to 4 hours of your bedtime. Lova Mean · Sleep on a comfortable pillow and mattress. · If watching the clock makes you anxious, turn it facing away from you so you cannot see the time. · If you worry when you lie down, start a worry book. Well before bedtime, write down your worries, and then set the book and your concerns aside. · Try meditation or other relaxation techniques before you go to bed. · If you cannot fall asleep, get up and go to another room until you feel sleepy. Do something relaxing. Repeat your bedtime routine before you go to bed again. · Make your house quiet and calm about an hour before bedtime. Turn down the lights, turn off the TV, log off the computer, and turn down the volume on music. This can help you relax after a busy day. Drowsy Driving: [employer] cites drowsiness as a causing factor in more than 408,046 police reported crashes annually, resulting in 76,000 injuries and 1,500 deaths. Other surveys suggest 55% of people polled have driven while drowsy in the past year, 23% had fallen asleep but not crashed, 3% crashed, and 2% had and accident due to drowsy driving. Who is at risk? Young Drivers: One study of drowsy driving accidents states that 55% of the drivers were under 25 years. Of those, 75% were male. Shift Workers and Travelers: People who work overnight or travel across time zones frequently are at higher risk of experiencing Circadian Rhythm Disorders. They are trying to work and function when their body is programed to sleep. Sleep Deprived: Lack of sleep has a serious impact on your ability to pay attention or focus on a task. Consistently getting less than the average of 8 hours your body needs creates partial or cumulative sleep deprivation. Untreated Sleep Disorders: Sleep Apnea, Narcolepsy, R.L.S., and other sleep disorders (untreated) prevent a person from getting enough restful sleep. This leads to excessive daytime sleepiness and increases the risk for drowsy driving accidents by up to 7 times. Medications / Alcohol: Even over the counter medications can cause drowsiness. Medications that impair a drivers attention should have a warning label.  Alcohol naturally makes you sleepy and on its own can cause accidents. Combined with excessive drowsiness its effects are amplified. Signs of Drowsy Driving: * You don't remember driving the last few miles * You may drift out of your mateo * You are unable to focus and your thoughts wander * You may yawn more often than normal 
 * You have difficulty keeping your eyes open / nodding off * Missing traffic signs, speeding, or tailgating Prevention-  
Good sleep hygiene, lifestyle and behavioral choices have the most impact on drowsy driving. There is no substitute for sleep and the average person requires 8 hours nightly. If you find yourself driving drowsy, stop and sleep. Consider the sleep hygiene tips provided during your visit as well. Medication Refill Policy: Refills for all medications require 1 week advance notice. Please have your pharmacy fax a refill request. We are unable to fax, or call in \"controled substance\" medications and you will need to pick these prescriptions up from our office. SoundHound Activation Thank you for requesting access to SoundHound. Please follow the instructions below to securely access and download your online medical record. SoundHound allows you to send messages to your doctor, view your test results, renew your prescriptions, schedule appointments, and more. How Do I Sign Up? 1. In your internet browser, go to [email or web address]. 2. Click on the First Time User? Click Here link in the Sign In box. You will see the New Member Sign Up page. 3. Enter your SoundHound Access Code exactly as it appears below. You will not need to use this code after youve completed the sign-up process. If you do not sign up before the expiration date, you must request a new code. SoundHound Access Code: [email or web address] Expires: 2017  9:58 AM (This is the date your SoundHound access code will ) 4.  Enter the last four digits of your Social Security Number (xxxx) and Date of Birth (mm/dd/yyyy) as indicated and click Submit. You will be taken to the next sign-up page. 5. Create a Sian's Plant ID. This will be your Cass Art login ID and cannot be changed, so think of one that is secure and easy to remember. 6. Create a Cass Art password. You can change your password at any time. 7. Enter your Password Reset Question and Answer. This can be used at a later time if you forget your password. 8. Enter your e-mail address. You will receive e-mail notification when new information is available in [address] 9. Click Sign Up. You can now view and download portions of your medical record. 10. Click the Download Summary menu link to download a portable copy of your medical information. Additional Information If you have questions, please call [phone]. Remember, Cass Art is NOT to be used for urgent needs. For medical emergencies, dial 918. [address] [place]. 1668 [facility], [address] Tel.  [phone] Fax. 100 [facility] [address] Tel.  [phone] Fax. [phone] [address] [place] [address] Tel.  [phone] Fax. [phone] Insomnia: After Your Visit Your Care Instructions Insomnia is the inability to sleep well. It is a common problem for most people at some time. Insomnia may make it hard for you to get to sleep, stay asleep, or sleep as long as you need to. This can make you tired and grouchy during the day. It can also make you forgetful, less effective at work, and unhappy. Insomnia can be caused by conditions such as depression or anxiety. Pain can also affect your ability to sleep. When these problems are solved, the insomnia usually clears up. But sometimes bad sleep habits can cause insomnia. If insomnia is affecting your work or your enjoyment of life, you can take steps to improve your sleep. Follow-up care is a key part of your treatment and safety.  Be sure to make and go to all appointments, and call your doctor if you are having problems. It's also a good idea to know your test results and keep a list of the medicines you take. How can you care for yourself at home? What to avoid · Do not have drinks with caffeine, such as coffee or black tea, for 8 hours before bed. · Do not smoke or use other types of tobacco near bedtime. Nicotine is a stimulant and can keep you awake. · Avoid drinking alcohol late in the evening, because it can cause you to wake in the middle of the night. · Do not eat a big meal close to bedtime. If you are hungry, eat a light snack. · Do not drink a lot of water close to bedtime, because the need to urinate may wake you up during the night. · Do not read or watch TV in bed. Use the bed only for sleeping and sexual activity. What to try · Go to bed at the same time every night, and wake up at the same time every morning. Do not take naps during the day. · Keep your bedroom quiet, dark, and cool. · Get regular exercise, but not within 3 to 4 hours of your bedtime. Christiano Hartman · Sleep on a comfortable pillow and mattress. · If watching the clock makes you anxious, turn it facing away from you so you cannot see the time. · If you worry when you lie down, start a worry book. Well before bedtime, write down your worries, and then set the book and your concerns aside. · Try meditation or other relaxation techniques before you go to bed. · If you cannot fall asleep, get up and go to another room until you feel sleepy. Do something relaxing. Repeat your bedtime routine before you go to bed again. · Make your house quiet and calm about an hour before bedtime. Turn down the lights, turn off the TV, log off the computer, and turn down the volume on music. This can help you relax after a busy day. When should you call for help? Watch closely for changes in your health, and be sure to contact your doctor if: · Your efforts to improve your sleep do not work. · Your insomnia gets worse. · You fall asleep during the day. Where can you learn more? Go to HALO2CLOUD.be Enter P513 in the search box to learn more about \"Insomnia: After Your Visit. \"  
© 2470-8117 Healthwise, Incorporated. Care instructions adapted under license by [facility] (which disclaims liability or warranty for this information). This care instruction is for use with your licensed healthcare professional. If you have questions about a medical condition or this instruction, always ask your healthcare professional. [place] [address] any warranty or liability for your use of this information. Content Version: 6.3.46206; Last Revised: June 26, 2010 Drowsy Driving: The [employer] cites drowsiness as a causing factor in more than 648,431 police reported crashes annually, resulting in 76,000 injuries and 1,500 deaths. Other surveys suggest 55% of people polled have driven while drowsy in the past year, 23% had fallen asleep but not crashed, 3% crashed, and 2% had and accident due to drowsy driving. Who is at risk? Young Drivers: One study of drowsy driving accidents states that 55% of the drivers were under 25 years. Of those, 75% were male. Shift Workers and Travelers: People who work overnight or travel across time zones frequently are at higher risk of experiencing Circadian Rhythm Disorders. They are trying to work and function when their body is programed to sleep. Sleep Deprived: Lack of sleep has a serious impact on your ability to pay attention or focus on a task. Consistently getting less than the average of 8 hours your body needs creates partial or cumulative sleep deprivation.   
Untreated Sleep Disorders: Sleep Apnea, Narcolepsy, R.L.S., and other sleep disorders (untreated) prevent a person from getting enough restful sleep. This leads to excessive daytime sleepiness and increases the risk for drowsy driving accidents by up to 7 times. Medications / Alcohol: Even over the counter medications can cause drowsiness. Medications that impair a drivers attention should have a warning label. Alcohol naturally makes you sleepy and on its own can cause accidents. Combined with excessive drowsiness its effects are amplified. Signs of Drowsy Driving: * You don't remember driving the last few miles * You may drift out of your mateo * You are unable to focus and your thoughts wander * You may yawn more often than normal 
 * You have difficulty keeping your eyes open / nodding off * Missing traffic signs, speeding, or tailgating Prevention-  
Good sleep hygiene, lifestyle and behavioral choices have the most impact on drowsy driving. There is no substitute for sleep and the average person requires 8 hours nightly. If you find yourself driving drowsy, stop and sleep. Consider the sleep hygiene tips provided during your visit as well. Medication Refill Policy: Refills for all medications require 1 week advance notice. Please have your pharmacy fax a refill request. We are unable to fax, or call in \"controled substance\" medications and you will need to pick these prescriptions up from our office. EyeTechCare Activation Thank you for requesting access to EyeTechCare. Please follow the instructions below to securely access and download your online medical record. EyeTechCare allows you to send messages to your doctor, view your test results, renew your prescriptions, schedule appointments, and more. How Do I Sign Up? 
 
11. In your internet browser, go to [email or web address]. 12. Click on the First Time User? Click Here link in the Sign In box. You will see the New Member Sign Up page. 15. Enter your EyeTechCare Access Code exactly as it appears below.  You will not need to use this code after youve completed the sign-up process. If you do not sign up before the expiration date, you must request a new code. Pocket Change Access Code: [email or web address] Expires: 2017  9:58 AM (This is the date your Ampla Pharmaceuticalst access code will ) 14. Enter the last four digits of your Social Security Number (xxxx) and Date of Birth (mm/dd/yyyy) as indicated and click Submit. You will be taken to the next sign-up page. 15. Create a Ampla Pharmaceuticalst ID. This will be your Pocket Change login ID and cannot be changed, so think of one that is secure and easy to remember. 12. Create a Pocket Change password. You can change your password at any time. 16. Enter your Password Reset Question and Answer. This can be used at a later time if you forget your password. 25. Enter your e-mail address. You will receive e-mail notification when new information is available in [address] 19. Click Sign Up. You can now view and download portions of your medical record. 20. Click the Download Summary menu link to download a portable copy of your medical information. Additional Information If you have questions, please call [phone]. Remember, Pocket Change is NOT to be used for urgent needs. For medical emergencies, dial 911. Introducing [facility]! [facility] introduces Pocket Change patient portal. Now you can access parts of your medical record, email your doctor's office, and request medication refills online. 1. In your internet browser, go to [email or web address] 2. Click on the First Time User? Click Here link in the Sign In box. You will see the New Member Sign Up page. 3. Enter your Pocket Change Access Code exactly as it appears below. You will not need to use this code after youve completed the sign-up process. If you do not sign up before the expiration date, you must request a new code.  
 
· Pocket Change Access Code: [email or web address] 
 Expires: 7/6/2017  9:58 AM 
 
4. Enter the last four digits of your Social Security Number (xxxx) and Date of Birth (mm/dd/yyyy) as indicated and click Submit. You will be taken to the next sign-up page. 5. Create a Keemotion ID. This will be your Keemotion login ID and cannot be changed, so think of one that is secure and easy to remember. 6. Create a Keemotion password. You can change your password at any time. 7. Enter your Password Reset Question and Answer. This can be used at a later time if you forget your password. 8. Enter your e-mail address. You will receive e-mail notification when new information is available in [address] 9. Click Sign Up. You can now view and download portions of your medical record. 10. Click the Download Summary menu link to download a portable copy of your medical information. If you have questions, please visit the Frequently Asked Questions section of the Keemotion website. Remember, Keemotion is NOT to be used for urgent needs. For medical emergencies, dial 911. Now available from your iPhone and Android! Please provide this summary of care documentation to your next provider. Your primary care clinician is listed as Swati Matson. If you have any questions after today's visit, please call [phone].

## 2017-05-25 NOTE — PATIENT INSTRUCTIONS
[address] [facility].[address], [address]  Tel.  [phone]  Fax. 100 [facility] 60  [place], [address] [employer]  Tel.  [phone]  Fax. [phone] [address]  Tel.  [phone]  Fax. [phone]     Learning About CPAP for Sleep Apnea  What is CPAP? CPAP is a small machine that you use at home every night while you sleep. It increases air pressure in your throat to keep your airway open. When you have sleep apnea, this can help you sleep better so you feel much better. CPAP stands for \"continuous positive airway pressure. \"  The CPAP machine will have one of the following:  · A mask that covers your nose and mouth  · Prongs that fit into your nose  · A mask that covers your nose only, the most common type. This type is called NCPAP. The N stands for \"nasal.\"  Why is it done? CPAP is usually the best treatment for obstructive sleep apnea. It is the first treatment choice and the most widely used. Your doctor may suggest CPAP if you have:  · Moderate to severe sleep apnea. · Sleep apnea and coronary artery disease (CAD) or heart failure. How does it help? · CPAP can help you have more normal sleep, so you feel less sleepy and more alert during the daytime. · CPAP may help keep heart failure or other heart problems from getting worse. · NCPAP may help lower your blood pressure. · If you use CPAP, your bed partner may also sleep better because you are not snoring or restless. What are the side effects? Some people who use CPAP have:  · A dry or stuffy nose and a sore throat. · Irritated skin on the face. · Sore eyes. · Bloating. If you have any of these problems, work with your doctor to fix them. Here are some things you can try:  · Be sure the mask or nasal prongs fit well. · See if your doctor can adjust the pressure of your CPAP. · If your nose is dry, try a humidifier.   · If your nose is runny or stuffy, try decongestant medicine or a steroid nasal spray. If these things do not help, you might try a different type of machine. Some machines have air pressure that adjusts on its own. Others have air pressures that are different when you breathe in than when you breathe out. This may reduce discomfort caused by too much pressure in your nose. Where can you learn more? Go to Black Card Media.be  Enter Pilar Haver in the search box to learn more about \"Learning About CPAP for Sleep Apnea. \"   © 7379-4140 Healthwise, Incorporated. Care instructions adapted under license by [employer] (which disclaims liability or warranty for this information). This care instruction is for use with your licensed healthcare professional. If you have questions about a medical condition or this instruction, always ask your healthcare professional. [address] any warranty or liability for your use of this information. Content Version: 1.1.01067; Last Revised: January 11, 2010  PROPER SLEEP HYGIENE    What to avoid  · Do not have drinks with caffeine, such as coffee or black tea, for 8 hours before bed. · Do not smoke or use other types of tobacco near bedtime. Nicotine is a stimulant and can keep you awake. · Avoid drinking alcohol late in the evening, because it can cause you to wake in the middle of the night. · Do not eat a big meal close to bedtime. If you are hungry, eat a light snack. · Do not drink a lot of water close to bedtime, because the need to urinate may wake you up during the night. · Do not read or watch TV in bed. Use the bed only for sleeping and sexual activity. What to try  · Go to bed at the same time every night, and wake up at the same time every morning. Do not take naps during the day. · Keep your bedroom quiet, dark, and cool. · Get regular exercise, but not within 3 to 4 hours of your bedtime. .  · Sleep on a comfortable pillow and mattress.   · If watching the clock makes you anxious, turn it facing away from you so you cannot see the time. · If you worry when you lie down, start a worry book. Well before bedtime, write down your worries, and then set the book and your concerns aside. · Try meditation or other relaxation techniques before you go to bed. · If you cannot fall asleep, get up and go to another room until you feel sleepy. Do something relaxing. Repeat your bedtime routine before you go to bed again. · Make your house quiet and calm about an hour before bedtime. Turn down the lights, turn off the TV, log off the computer, and turn down the volume on music. This can help you relax after a busy day. Drowsy Driving: [employer] cites drowsiness as a causing factor in more than 724,433 police reported crashes annually, resulting in 76,000 injuries and 1,500 deaths. Other surveys suggest 55% of people polled have driven while drowsy in the past year, 23% had fallen asleep but not crashed, 3% crashed, and 2% had and accident due to drowsy driving. Who is at risk? Young Drivers: One study of drowsy driving accidents states that 55% of the drivers were under 25 years. Of those, 75% were male. Shift Workers and Travelers: People who work overnight or travel across time zones frequently are at higher risk of experiencing Circadian Rhythm Disorders. They are trying to work and function when their body is programed to sleep. Sleep Deprived: Lack of sleep has a serious impact on your ability to pay attention or focus on a task. Consistently getting less than the average of 8 hours your body needs creates partial or cumulative sleep deprivation. Untreated Sleep Disorders: Sleep Apnea, Narcolepsy, R.L.S., and other sleep disorders (untreated) prevent a person from getting enough restful sleep. This leads to excessive daytime sleepiness and increases the risk for drowsy driving accidents by up to 7 times.   Medications / Alcohol: Even over the counter medications can cause drowsiness. Medications that impair a drivers attention should have a warning label. Alcohol naturally makes you sleepy and on its own can cause accidents. Combined with excessive drowsiness its effects are amplified. Signs of Drowsy Driving:   * You don't remember driving the last few miles   * You may drift out of your mateo   * You are unable to focus and your thoughts wander   * You may yawn more often than normal   * You have difficulty keeping your eyes open / nodding off   * Missing traffic signs, speeding, or tailgating  Prevention-   Good sleep hygiene, lifestyle and behavioral choices have the most impact on drowsy driving. There is no substitute for sleep and the average person requires 8 hours nightly. If you find yourself driving drowsy, stop and sleep. Consider the sleep hygiene tips provided during your visit as well. Medication Refill Policy: Refills for all medications require 1 week advance notice. Please have your pharmacy fax a refill request. We are unable to fax, or call in \"controled substance\" medications and you will need to pick these prescriptions up from our office. GoHome Activation    Thank you for requesting access to GoHome. Please follow the instructions below to securely access and download your online medical record. GoHome allows you to send messages to your doctor, view your test results, renew your prescriptions, schedule appointments, and more. How Do I Sign Up? 1. In your internet browser, go to [email or web address]. 2. Click on the First Time User? Click Here link in the Sign In box. You will see the New Member Sign Up page. 3. Enter your GoHome Access Code exactly as it appears below. You will not need to use this code after youve completed the sign-up process. If you do not sign up before the expiration date, you must request a new code.     GoHome Access Code: [email or web address]  Expires: 2017  9:58 AM (This is the date your [email or web address] access code will )    4. Enter the last four digits of your Social Security Number (xxxx) and Date of Birth (mm/dd/yyyy) as indicated and click Submit. You will be taken to the next sign-up page. 5. Create a vpod.tv ID. This will be your vpod.tv login ID and cannot be changed, so think of one that is secure and easy to remember. 6. Create a vpod.tv password. You can change your password at any time. 7. Enter your Password Reset Question and Answer. This can be used at a later time if you forget your password. 8. Enter your e-mail address. You will receive e-mail notification when new information is available in [address] 9. Click Sign Up. You can now view and download portions of your medical record. 10. Click the Download Summary menu link to download a portable copy of your medical information. Additional Information    If you have questions, please call [phone]. Remember, [email or web address] is NOT to be used for urgent needs. For medical emergencies, dial 926. [address] [place]. [place], [address]  Tel.  [phone]  Fax. 100 [facility] 60  [address]  Tel.  [phone]  Fax. [phone] [address] [place] [address]  Tel.  [phone]  Fax. [phone]       Insomnia: After Your Visit  Your Care Instructions  Insomnia is the inability to sleep well. It is a common problem for most people at some time. Insomnia may make it hard for you to get to sleep, stay asleep, or sleep as long as you need to. This can make you tired and grouchy during the day. It can also make you forgetful, less effective at work, and unhappy. Insomnia can be caused by conditions such as depression or anxiety. Pain can also affect your ability to sleep. When these problems are solved, the insomnia usually clears up. But sometimes bad sleep habits can cause insomnia.   If insomnia is affecting your work or your enjoyment of life, you can take steps to improve your sleep. Follow-up care is a key part of your treatment and safety. Be sure to make and go to all appointments, and call your doctor if you are having problems. It's also a good idea to know your test results and keep a list of the medicines you take. How can you care for yourself at home? What to avoid  · Do not have drinks with caffeine, such as coffee or black tea, for 8 hours before bed. · Do not smoke or use other types of tobacco near bedtime. Nicotine is a stimulant and can keep you awake. · Avoid drinking alcohol late in the evening, because it can cause you to wake in the middle of the night. · Do not eat a big meal close to bedtime. If you are hungry, eat a light snack. · Do not drink a lot of water close to bedtime, because the need to urinate may wake you up during the night. · Do not read or watch TV in bed. Use the bed only for sleeping and sexual activity. What to try  · Go to bed at the same time every night, and wake up at the same time every morning. Do not take naps during the day. · Keep your bedroom quiet, dark, and cool. · Get regular exercise, but not within 3 to 4 hours of your bedtime. .  · Sleep on a comfortable pillow and mattress. · If watching the clock makes you anxious, turn it facing away from you so you cannot see the time. · If you worry when you lie down, start a worry book. Well before bedtime, write down your worries, and then set the book and your concerns aside. · Try meditation or other relaxation techniques before you go to bed. · If you cannot fall asleep, get up and go to another room until you feel sleepy. Do something relaxing. Repeat your bedtime routine before you go to bed again. · Make your house quiet and calm about an hour before bedtime. Turn down the lights, turn off the TV, log off the computer, and turn down the volume on music.  This can help you relax after a busy day. When should you call for help? Watch closely for changes in your health, and be sure to contact your doctor if:  · Your efforts to improve your sleep do not work. · Your insomnia gets worse. · You fall asleep during the day. Where can you learn more? Go to BostInno.be  Enter P513 in the search box to learn more about \"Insomnia: After Your Visit. \"   © 1864-9791 Healthwise, Incorporated. Care instructions adapted under license by [employer] (which disclaims liability or warranty for this information). This care instruction is for use with your licensed healthcare professional. If you have questions about a medical condition or this instruction, always ask your healthcare professional. [place] [address] any warranty or liability for your use of this information. Content Version: 9.6.80788; Last Revised: June 26, 2010    Drowsy Driving: [employer] cites drowsiness as a causing factor in more than 788,036 police reported crashes annually, resulting in 76,000 injuries and 1,500 deaths. Other surveys suggest 55% of people polled have driven while drowsy in the past year, 23% had fallen asleep but not crashed, 3% crashed, and 2% had and accident due to drowsy driving. Who is at risk? Young Drivers: One study of drowsy driving accidents states that 55% of the drivers were under 25 years. Of those, 75% were male. Shift Workers and Travelers: People who work overnight or travel across time zones frequently are at higher risk of experiencing Circadian Rhythm Disorders. They are trying to work and function when their body is programed to sleep. Sleep Deprived: Lack of sleep has a serious impact on your ability to pay attention or focus on a task. Consistently getting less than the average of 8 hours your body needs creates partial or cumulative sleep deprivation.    Untreated Sleep Disorders: Sleep Apnea, Narcolepsy, R.L.S., and other sleep disorders (untreated) prevent a person from getting enough restful sleep. This leads to excessive daytime sleepiness and increases the risk for drowsy driving accidents by up to 7 times. Medications / Alcohol: Even over the counter medications can cause drowsiness. Medications that impair a drivers attention should have a warning label. Alcohol naturally makes you sleepy and on its own can cause accidents. Combined with excessive drowsiness its effects are amplified. Signs of Drowsy Driving:   * You don't remember driving the last few miles   * You may drift out of your mateo   * You are unable to focus and your thoughts wander   * You may yawn more often than normal   * You have difficulty keeping your eyes open / nodding off   * Missing traffic signs, speeding, or tailgating  Prevention-   Good sleep hygiene, lifestyle and behavioral choices have the most impact on drowsy driving. There is no substitute for sleep and the average person requires 8 hours nightly. If you find yourself driving drowsy, stop and sleep. Consider the sleep hygiene tips provided during your visit as well. Medication Refill Policy: Refills for all medications require 1 week advance notice. Please have your pharmacy fax a refill request. We are unable to fax, or call in \"controled substance\" medications and you will need to pick these prescriptions up from our office. [employer] Activation    Thank you for requesting access to [employer]. Please follow the instructions below to securely access and download your online medical record. [employer] Systems allows you to send messages to your doctor, view your test results, renew your prescriptions, schedule appointments, and more. How Do I Sign Up?    11. In your internet browser, go to [email or web address] 12. Click on the First Time User? Click Here link in the Sign In box. You will see the New Member Sign Up page.   13. Enter your Zetera Access Code exactly as it appears below. You will not need to use this code after youve completed the sign-up process. If you do not sign up before the expiration date, you must request a new code. Zetera Access Code: [email or web address]  Expires: 2017  9:58 AM (This is the date your Ginger Softwaret access code will )    14. Enter the last four digits of your Social Security Number (xxxx) and Date of Birth (mm/dd/yyyy) as indicated and click Submit. You will be taken to the next sign-up page. 15. Create a Ginger Softwaret ID. This will be your Zetera login ID and cannot be changed, so think of one that is secure and easy to remember. 12. Create a Zetera password. You can change your password at any time. 16. Enter your Password Reset Question and Answer. This can be used at a later time if you forget your password. 25. Enter your e-mail address. You will receive e-mail notification when new information is available in [address] 19. Click Sign Up. You can now view and download portions of your medical record. 20. Click the Download Summary menu link to download a portable copy of your medical information. Additional Information    If you have questions, please call [phone]. Remember, Zetera is NOT to be used for urgent needs. For medical emergencies, dial 911.

## 2017-05-26 ENCOUNTER — TELEPHONE (OUTPATIENT)
Dept: SLEEP MEDICINE | Age: 57
End: 2017-05-26

## 2017-05-26 NOTE — TELEPHONE ENCOUNTER
Left message to call back. When she calls back, need to find which DME she used in the past, so we can send order. If not, we will send to [facility].

## 2017-05-30 ENCOUNTER — DOCUMENTATION ONLY (OUTPATIENT)
Dept: SLEEP MEDICINE | Age: 57
End: 2017-05-30

## 2017-06-09 ENCOUNTER — OFFICE VISIT (OUTPATIENT)
Dept: FAMILY MEDICINE CLINIC | Age: 57
End: 2017-06-09

## 2017-06-09 VITALS
SYSTOLIC BLOOD PRESSURE: 106 MMHG | HEART RATE: 52 BPM | HEIGHT: 67 IN | WEIGHT: 157 LBS | BODY MASS INDEX: 24.64 KG/M2 | DIASTOLIC BLOOD PRESSURE: 67 MMHG | RESPIRATION RATE: 18 BRPM | OXYGEN SATURATION: 97 % | TEMPERATURE: 98.2 F

## 2017-06-09 DIAGNOSIS — M79.7 FIBROMYALGIA: Primary | ICD-10-CM

## 2017-06-09 DIAGNOSIS — R19.02 ABDOMINAL MASS, LEFT UPPER QUADRANT: ICD-10-CM

## 2017-06-09 DIAGNOSIS — M54.50 ACUTE BILATERAL LOW BACK PAIN WITHOUT SCIATICA: ICD-10-CM

## 2017-06-09 DIAGNOSIS — D68.9 COAGULATION DISORDER (HCC): ICD-10-CM

## 2017-06-09 RX ORDER — GABAPENTIN 400 MG/1
400 CAPSULE ORAL 3 TIMES DAILY
Qty: 90 CAP | Refills: 1 | Status: SHIPPED | OUTPATIENT
Start: 2017-06-09 | End: 2017-10-17 | Stop reason: SDUPTHER

## 2017-06-09 NOTE — MR AVS SNAPSHOT
Visit Information Date & Time Provider Department Dept. Phone Encounter #  
 6/9/2017  9:45 AM Beaulah Habermann,  [facility] [phone] [id number] Follow-up Instructions Return in about 4 weeks (around 7/7/2017). Your Appointments 7/27/2017 10:00 AM  
Any with Julia Magaña MD  
[address] ([facility]) Appt Note: 1st adherence [address] [id number] [address] [facility] [id number] Upcoming Health Maintenance Date Due Hepatitis C Screening 1960 BREAST CANCER SCRN MAMMOGRAM 11/25/2012 FOBT Q 1 YEAR AGE 50-75 3/22/2017 INFLUENZA AGE 9 TO ADULT 8/1/2017 PAP AKA CERVICAL CYTOLOGY 12/13/2019 DTaP/Tdap/Td series (2 - Td) 12/13/2026 Allergies as of 6/9/2017  Review Complete On: 6/9/2017 By: Beaulah Habermann, NP Severity Noted Reaction Type Reactions Ambien [Zolpidem]  09/09/2016    Unknown (comments) Causes Migraine Cephalexin  03/17/2016    Rash Morphine  03/17/2016    Rash Motrin [Ibuprofen]  09/09/2016    Swelling Nortriptyline  09/09/2016    Unknown (comments) Bleeding from mouth Protonix [Pantoprazole]  03/17/2016    Rash Vicodin [Hydrocodone-acetaminophen]  09/09/2016    Unknown (comments) Causes Migraines Vitamins For Infusion  09/09/2016    Unknown (comments) Pt stated all vitamins cause her lightheadness and sick Current Immunizations  Reviewed on 6/16/2016 Name Date Influenza High Dose Vaccine PF 8/15/2016 Influenza Vaccine 9/1/2015 Pneumococcal Vaccine (Unspecified Type) 9/1/2015 Tdap 12/13/2016 Not reviewed this visit You Were Diagnosed With   
  
 Codes Comments Fibromyalgia    -  Primary ICD-10-CM: M79.7 ICD-9-CM: 729.1  Abdominal mass, left upper quadrant     ICD-10-CM: R19.02 
ICD-9-CM: 789.32   
 Acute bilateral low back pain without sciatica     ICD-10-CM: M54.5 ICD-9-CM: 724.2, 338.19 Coagulation disorder ([facility] [address]     ICD-10-CM: U25.6 ICD-9-CM: 286. 9 Vitals BP Pulse Temp Resp Height(growth percentile) Weight(growth percentile) 106/67 (BP 1 Location: Right arm, BP Patient Position: Sitting) (!) 52 98.2 °F (36.8 °C) (Oral) 18 5' 7\" (1.702 m) 157 lb (71.2 kg) SpO2 BMI OB Status Smoking Status 97% 24.59 kg/m2 Hysterectomy Never Smoker Vitals History BMI and BSA Data Body Mass Index Body Surface Area 24.59 kg/m 2 1.83 m 2 Preferred Pharmacy Pharmacy Name Phone [address] [facility] [phone] Your Updated Medication List  
  
   
This list is accurate as of: 6/9/17 10:22 AM.  Always use your most recent med list.  
  
  
  
  
 albuterol 2.5 mg /3 mL (0.083 %) nebulizer solution Commonly known as:  PROVENTIL VENTOLIN  
by Nebulization route once. ARIPiprazole 2 mg tablet Commonly known as:  ABILIFY Take 2 mg by mouth daily. aspirin 81 mg chewable tablet Take 81 mg by mouth daily. buPROPion  mg tablet Commonly known as:  Tarri [place] Take 1 Tab by mouth every morning. Cetirizine 10 mg Cap Take  by mouth. * DEPAKOTE 250 mg tablet Generic drug:  divalproex DR Take 250 mg by mouth three (3) times daily. * divalproex  mg ER tablet Commonly known as:  DEPAKOTE ER  
take 1 tablet by mouth twice a day  
  
 donepezil 10 mg tablet Commonly known as:  ARICEPT Take 10 mg by mouth nightly. enoxaparin 40 mg/0.4 mL Commonly known as:  LOVENOX  
  
 fluticasone 50 mcg/actuation nasal spray Commonly known as:  Creasie Cockayne 2 Sprays by Both Nostrils route daily. fluticasone-salmeterol 500-50 mcg/dose diskus inhaler Commonly known as:  ADVAIR Take 1 Puff by inhalation every twelve (12) hours. gabapentin 400 mg capsule Commonly known as:  NEURONTIN Take 1 Cap by mouth three (3) times daily. hydrocortisone 2.5 % rectal cream  
Commonly known as:  ANUSOL-HC Insert  into rectum four (4) times daily. melatonin 3 mg tablet Take  by mouth.  
  
 montelukast 10 mg tablet Commonly known as:  SINGULAIR Take 1 Tab by mouth daily. NAMENDA XR 28 mg capsule Generic drug:  memantine ER One cap once daily. oxyCODONE-acetaminophen 7.5-325 mg per tablet Commonly known as:  PERCOCET 7.5  
take 1 to 2 tablets by mouth every 4 to 6 hours if needed for pain PARoxetine 20 mg tablet Commonly known as:  PAXIL Take 1 Tab by mouth daily. raNITIdine 150 mg tablet Commonly known as:  ZANTAC Take 150 mg by mouth two (2) times a day. simethicone 180 mg Cap Take  by mouth. simvastatin 20 mg tablet Commonly known as:  ZOCOR Take 1 Tab by mouth nightly. topiramate 100 mg tablet Commonly known as:  TOPAMAX Take  by mouth two (2) times a day. traZODone 100 mg tablet Commonly known as:  Mariea Dade Take 1 Tab by mouth nightly as needed for Sleep. * Notice: This list has 2 medication(s) that are the same as other medications prescribed for you. Read the directions carefully, and ask your doctor or other care provider to review them with you. Prescriptions Sent to Pharmacy Refills  
 gabapentin (NEURONTIN) 400 mg capsule 1 Sig: Take 1 Cap by mouth three (3) times daily. Class: Normal  
 Pharmacy: [address] Ph #: [phone] Route: Oral  
  
We Performed the Following REFERRAL TO HEMATOLOGY ONCOLOGY [RHT42 Custom] Comments:  
 Please evaluate patient for coagulation disorder. Follow-up Instructions Return in about 4 weeks (around 7/7/2017). To-Do List   
 06/09/2017 Imaging:  US ABD COMP Referral Information Referral ID Referred By Referred To [id number] Dionne Nair MD   
   [facility] [address] [facility], [address] Phone: [phone] Fax: [phone] Visits Status Start Date End Date 1 New Request 6/9/17 6/9/18 If your referral has a status of pending review or denied, additional information will be sent to support the outcome of this decision. Patient Instructions Getting Back to Normal After Low Back Pain: Care Instructions Your Care Instructions Almost everyone has low back pain at some time. The good news is that most low back pain will go away in a few days or weeks with some basic self-care. Some people are afraid that doing too much may make their pain worse. In the past, people stayed in bed, thinking this would help their backs. Now doctors think that, in most cases, getting back to your normal activities is good for your back, as long as you avoid doing things that make your pain worse. Follow-up care is a key part of your treatment and safety. Be sure to make and go to all appointments, and call your doctor if you are having problems. It's also a good idea to know your test results and keep a list of the medicines you take. How can you care for yourself at home? Ease back into daily activities · For the first day or two of pain, take it easy. But as soon as possible, get back to your normal daily life and activities. · Get gentle exercise, such as walking. Movement keeps your spine flexible and helps your muscles stay strong. · If you are an athlete, return to your activity carefully. Choose a low-impact option until your pain is under control. Avoid or change activities that cause pain · Try to avoid too much bending, heavy lifting, or reaching. These movements put extra stress on your back. · In bed, try lying on your side with a pillow between your knees. Or lie on your back on the floor with a pillow under your knees. · When you sit, place a small pillow, a rolled-up towel, or a lumbar roll in the curve of your back for extra support. · Try putting one foot up on a stool or changing positions every few minutes if you have to stand still for a period of time. Pay attention to body mechanics and posture Body mechanics are the way you use your body. Posture is the way you sit or stand. · Take extra care when you lift. When you must lift, bend your knees and keep your back straight. Avoid twisting, and keep the load close to your body. · Stand or sit tall, with your shoulders back and your stomach pulled in to support your back. Get support when you need it · Let people know when you need a helping hand. Get family members or friends to help out with tasks you cannot do right now. · Be honest with your doctor about how the pain affects you. · If you have had to take time off work, talk to your doctor and boss about a gradual yyspgt-tt-pucy plan. Find out if there are other ways you could do your job to avoid hurting your back again. Reduce stress Worrying about the pain can cause you to tense the muscles in your lower back. This in turn causes more pain. Here are a few things you can do to relax your mind and your muscles: · Take 10 to 15 minutes to sit quietly and breathe deeply. Try to focus only on your breathing. If you cannot keep thoughts away, think about things that make you feel good. · Get involved in your favorite hobby, or try something new. · Talk to a friend, read a book, or listen to your favorite music. · Find a counselor you like and trust. Talk openly and honestly about your problems. Be willing to make some changes. When should you call for help? Call 911 anytime you think you may need emergency care. For example, call if: 
· You are unable to move a leg at all. Call your doctor now or seek immediate medical care if: 
· You have new or worse symptoms in your legs, belly, or buttocks. Symptoms may include: ¨ Numbness or tingling. ¨ Weakness. ¨ Pain. · You lose bladder or bowel control. Watch closely for changes in your health, and be sure to contact your doctor if: 
· You are not getting better as expected. Where can you learn more? Go to http://shell-td.info/. Enter V294 in the search box to learn more about \"Getting Back to Normal After Low Back Pain: Care Instructions. \" Current as of: May 23, 2016 Content Version: 11.2 © 3298-5469 [employer]. Care instructions adapted under license by [email or web address] (which disclaims liability or warranty for this information). If you have questions about a medical condition or this instruction, always ask your healthcare professional. [address] any warranty or liability for your use of this information. Fibromyalgia: Care Instructions Your Care Instructions Fibromyalgia is a painful condition that is not completely understood by medical experts. The cause of fibromyalgia is not known. It can make you feel tired and ache all over. It causes tender spots at specific points of the body that hurt only when you press on them. You may have trouble sleeping, as well as other symptoms. These problems can upset your work and home life. Symptoms tend to come and go, although they may never go away completely. Fibromyalgia does not harm your muscles, joints, or organs. Follow-up care is a key part of your treatment and safety. Be sure to make and go to all appointments, and call your doctor if you are having problems. It's also a good idea to know your test results and keep a list of the medicines you take. How can you care for yourself at home? · Exercise often. Walk, swim, or bike to help with pain and sleep problems and to make you feel better. · Try to get a good night's sleep.  Go to bed and get up at the same time each day, whether you feel rested or not. Make sure you have a good mattress and pillow. · Reduce stress. Avoid things that cause you stress, if you can. If not, work at making them less stressful. Learn to use biofeedback, guided imagery, meditation, or other methods to relax. · Make healthy changes. Eat a balanced diet, quit smoking, and limit alcohol and caffeine. · Use a heating pad set on low or take warm baths or showers for pain. Using cold packs for up to 20 minutes at a time can also relieve pain. Put a thin cloth between the cold pack and your skin. A gentle massage might help too. · Be safe with medicines. Take your medicines exactly as prescribed. Call your doctor if you think you are having a problem with your medicine. Your doctor may talk to you about taking antidepressant medicines. These medicines may improve sleep, relieve pain, and in some cases treat depression. · Learn about fibromyalgia. This makes coping easier. Then, take an active role in your treatment. · Think about joining a support group with others who have fibromyalgia to learn more and get support. When should you call for help? Watch closely for changes in your health, and be sure to contact your doctor if: 
· You feel sad, helpless, or hopeless; lose interest in things you used to enjoy; or have other symptoms of depression. · Your fibromyalgia symptoms get worse. Where can you learn more? Go to http://shell-td.info/. Enter V003 in the search box to learn more about \"Fibromyalgia: Care Instructions. \" Current as of: October 14, 2016 Content Version: 11.2 © 6834-4049 [employer]. Care instructions adapted under license by [employer] (which disclaims liability or warranty for this information).  If you have questions about a medical condition or this instruction, always ask your healthcare professional. [employer] disclaims any warranty or liability for your use of this information. Introducing [facility]! [facility] introduces Lono patient portal. Now you can access parts of your medical record, email your doctor's office, and request medication refills online. 1. In your internet browser, go to [email or web address] 2. Click on the First Time User? Click Here link in the Sign In box. You will see the New Member Sign Up page. 3. Enter your Lono Access Code exactly as it appears below. You will not need to use this code after youve completed the sign-up process. If you do not sign up before the expiration date, you must request a new code. · Lono Access Code: [email or web address] Expires: 7/6/2017  9:58 AM 
 
4. Enter the last four digits of your Social Security Number (xxxx) and Date of Birth (mm/dd/yyyy) as indicated and click Submit. You will be taken to the next sign-up page. 5. Create a Lono ID. This will be your Lono login ID and cannot be changed, so think of one that is secure and easy to remember. 6. Create a Lono password. You can change your password at any time. 7. Enter your Password Reset Question and Answer. This can be used at a later time if you forget your password. 8. Enter your e-mail address. You will receive e-mail notification when new information is available in [address] 9. Click Sign Up. You can now view and download portions of your medical record. 10. Click the Download Summary menu link to download a portable copy of your medical information. If you have questions, please visit the Frequently Asked Questions section of the Lono website. Remember, Lono is NOT to be used for urgent needs. For medical emergencies, dial 911. Now available from your iPhone and Android! Please provide this summary of care documentation to your next provider. Your primary care clinician is listed as Fletcher Bynum. If you have any questions after today's visit, please call [phone].

## 2017-06-09 NOTE — PATIENT INSTRUCTIONS
Getting Back to Normal After Low Back Pain: Care Instructions  Your Care Instructions  Almost everyone has low back pain at some time. The good news is that most low back pain will go away in a few days or weeks with some basic self-care. Some people are afraid that doing too much may make their pain worse. In the past, people stayed in bed, thinking this would help their backs. Now doctors think that, in most cases, getting back to your normal activities is good for your back, as long as you avoid doing things that make your pain worse. Follow-up care is a key part of your treatment and safety. Be sure to make and go to all appointments, and call your doctor if you are having problems. It's also a good idea to know your test results and keep a list of the medicines you take. How can you care for yourself at home? Ease back into daily activities  · For the first day or two of pain, take it easy. But as soon as possible, get back to your normal daily life and activities. · Get gentle exercise, such as walking. Movement keeps your spine flexible and helps your muscles stay strong. · If you are an athlete, return to your activity carefully. Choose a low-impact option until your pain is under control. Avoid or change activities that cause pain  · Try to avoid too much bending, heavy lifting, or reaching. These movements put extra stress on your back. · In bed, try lying on your side with a pillow between your knees. Or lie on your back on the floor with a pillow under your knees. · When you sit, place a small pillow, a rolled-up towel, or a lumbar roll in the curve of your back for extra support. · Try putting one foot up on a stool or changing positions every few minutes if you have to stand still for a period of time. Pay attention to body mechanics and posture  Body mechanics are the way you use your body. Posture is the way you sit or stand. · Take extra care when you lift.  When you must lift, bend your knees and keep your back straight. Avoid twisting, and keep the load close to your body. · Stand or sit tall, with your shoulders back and your stomach pulled in to support your back. Get support when you need it  · Let people know when you need a helping hand. Get family members or friends to help out with tasks you cannot do right now. · Be honest with your doctor about how the pain affects you. · If you have had to take time off work, talk to your doctor and boss about a gradual uputtw-nw-sshl plan. Find out if there are other ways you could do your job to avoid hurting your back again. Reduce stress  Worrying about the pain can cause you to tense the muscles in your lower back. This in turn causes more pain. Here are a few things you can do to relax your mind and your muscles:  · Take 10 to 15 minutes to sit quietly and breathe deeply. Try to focus only on your breathing. If you cannot keep thoughts away, think about things that make you feel good. · Get involved in your favorite hobby, or try something new. · Talk to a friend, read a book, or listen to your favorite music. · Find a counselor you like and trust. Talk openly and honestly about your problems. Be willing to make some changes. When should you call for help? Call 911 anytime you think you may need emergency care. For example, call if:  · You are unable to move a leg at all. Call your doctor now or seek immediate medical care if:  · You have new or worse symptoms in your legs, belly, or buttocks. Symptoms may include:  ¨ Numbness or tingling. ¨ Weakness. ¨ Pain. · You lose bladder or bowel control. Watch closely for changes in your health, and be sure to contact your doctor if:  · You are not getting better as expected. Where can you learn more? Go to http://shell-td.info/. Enter W627 in the search box to learn more about \"Getting Back to Normal After Low Back Pain: Care Instructions. \"  Current as of:  May 23, 2016  Content Version: 11.2  © 7229-2983 mention. Care instructions adapted under license by [employer] (which disclaims liability or warranty for this information). If you have questions about a medical condition or this instruction, always ask your healthcare professional. [address] any warranty or liability for your use of this information. Fibromyalgia: Care Instructions  Your Care Instructions  Fibromyalgia is a painful condition that is not completely understood by medical experts. The cause of fibromyalgia is not known. It can make you feel tired and ache all over. It causes tender spots at specific points of the body that hurt only when you press on them. You may have trouble sleeping, as well as other symptoms. These problems can upset your work and home life. Symptoms tend to come and go, although they may never go away completely. Fibromyalgia does not harm your muscles, joints, or organs. Follow-up care is a key part of your treatment and safety. Be sure to make and go to all appointments, and call your doctor if you are having problems. It's also a good idea to know your test results and keep a list of the medicines you take. How can you care for yourself at home? · Exercise often. Walk, swim, or bike to help with pain and sleep problems and to make you feel better. · Try to get a good night's sleep. Go to bed and get up at the same time each day, whether you feel rested or not. Make sure you have a good mattress and pillow. · Reduce stress. Avoid things that cause you stress, if you can. If not, work at making them less stressful. Learn to use biofeedback, guided imagery, meditation, or other methods to relax. · Make healthy changes. Eat a balanced diet, quit smoking, and limit alcohol and caffeine. · Use a heating pad set on low or take warm baths or showers for pain.  Using cold packs for up to 20 minutes at a time can also relieve pain. Put a thin cloth between the cold pack and your skin. A gentle massage might help too. · Be safe with medicines. Take your medicines exactly as prescribed. Call your doctor if you think you are having a problem with your medicine. Your doctor may talk to you about taking antidepressant medicines. These medicines may improve sleep, relieve pain, and in some cases treat depression. · Learn about fibromyalgia. This makes coping easier. Then, take an active role in your treatment. · Think about joining a support group with others who have fibromyalgia to learn more and get support. When should you call for help? Watch closely for changes in your health, and be sure to contact your doctor if:  · You feel sad, helpless, or hopeless; lose interest in things you used to enjoy; or have other symptoms of depression. · Your fibromyalgia symptoms get worse. Where can you learn more? Go to http://shell-td.info/. Enter V003 in the search box to learn more about \"Fibromyalgia: Care Instructions. \"  Current as of: October 14, 2016  Content Version: 11.2  © 8860-9232 [employer]. Care instructions adapted under license by [employer] (which disclaims liability or warranty for this information). If you have questions about a medical condition or this instruction, always ask your healthcare professional. [address] any warranty or liability for your use of this information.

## 2017-06-09 NOTE — PROGRESS NOTES
Chief Complaint   Patient presents with    Other     C/o various lumps in stomach area     Back Pain     c/o back & shoulder pain     Referral Request     To hematology, has an appt in July 1. Have you been to the ER, urgent care clinic since your last visit? Hospitalized since your last visit? NO    2. Have you seen or consulted any other health care providers outside of the [address] [place] [address] since your last visit? Include any pap smears or colon screening. Yes psychiatrist & Orthopedic surgery.  Patient had Rotator cuff surgery at [facility]

## 2017-06-11 NOTE — PROGRESS NOTES
Franco Torres is a 49 y.o. female who presents with the following complaints:  Chief Complaint   Patient presents with    Other     C/o various lumps in stomach area     Back Pain     c/o back & shoulder pain     Referral Request     To hematology, has an appt in July        Subjective:    HPI:   C/o several month hx of \"lumps\" in abdomin, come and go, in different places around the upper abdomen, no tenderness, no erythema. No fever or chills. No change in bowel habits. No unintended weight loss. No blood noted in stool. No nausea or vomiting.  C/o increased upper and lower back pain, shoulder pain, leg pain r/t her fibromyalgia. States some days feels better but feels the overall trend is more days with increased symptoms. Reports good compliance with gabapentin, tolerating well. Has not started a formal exercise program.   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         Pertinent PMH/FH/SH:  Past Medical History:   Diagnosis Date    Anal fistula     Asthma     Back ache     CAD (coronary artery disease)     leaky valves    Cholesterol blood decreased     Dementia     Depression     Deviated septum     Diabetes (HCC)     pre diabetese    Fibromyalgia     Frequent headaches     Heart valve disease     Herniated nucleus pulposus, L5-S1     Migraine     Protein S deficiency (HCC)     Pulmonary emboli (HCC)     Pulmonary hypertension (HCC)     Restless leg syndrome     RLS (restless legs syndrome)     Sickle cell trait (HCC)     Sleep apnea     Stroke ([facility] [address]     Upper airway resistance syndrome     Vaginal fistula      Past Surgical History:   Procedure Laterality Date    CARDIAC SURG PROCEDURE UNLIST  9/23/99    closure of unroofed coronary sinus    HX ROTATOR CUFF REPAIR      3/2017     Family History   Problem Relation Age of Onset    Diabetes Mother     Heart Disease Mother     Hypertension Mother     Sickle Cell Anemia Sister     Hypertension Brother     Stroke Maternal Aunt      Social History     Social History    Marital status: SINGLE     Spouse name: N/A    Number of children: N/A    Years of education: N/A     Social History Main Topics    Smoking status: Never Smoker    Smokeless tobacco: Never Used    Alcohol use No    Drug use: No    Sexual activity: Not Currently     Other Topics Concern    None     Social History Narrative     Advanced Directives: N      Patient Active Problem List    Diagnosis    Prediabetes    Seasonal allergic rhinitis    Mild intermittent asthma without complication    Unroofed coronary sinus    Pulmonary embolism ([facility] [address]       Nurse notes were reviewed and are correct  Review of Systems - negative except as listed above in the HPI    Objective:     Vitals:    06/09/17 0953   BP: 106/67   Pulse: (!) 52   Resp: 18   Temp: 98.2 °F (36.8 °C)   TempSrc: Oral   SpO2: 97%   Weight: 157 lb (71.2 kg)   Height: 5' 7\" (1.702 m)     Physical Examination: General appearance - alert, well appearing, and in no distress, oriented to person, place, and time, normal appearing weight and well hydrated  Mental status - normal mood, behavior, speech, dress, motor activity, and thought processes  Neck - supple, no significant adenopathy  Chest - clear to auscultation, no wheezes, rales or rhonchi, symmetric air entry  Heart - normal rate, regular rhythm, normal S1, S2, no murmurs, rubs, clicks or gallops, no JVD  Abdomen - soft, nontender, nondistended, no organomegaly  Small mobile mass palpable in the LUQ, nontender  Neurological - alert, oriented, normal speech, no focal findings or movement disorder noted  Extremities - no pedal edema noted  Skin - normal coloration and turgor    Assessment/ Plan:   Gurjit Melara was seen today for other, back pain and referral request.    Diagnoses and all orders for this visit:    Fibromyalgia  uptitrate gabapentin, monitor response  Recommend regular exercise program  -     gabapentin (NEURONTIN) 400 mg capsule; Take 1 Cap by mouth three (3) times daily. Abdominal mass, left upper quadrant  ? node  Check US  -     US ABD COMP; Future    Acute bilateral low back pain without sciatica  uptitrate gabapentin  Monitor response  Recommend PT/aquatherapy or formal exercise program-patient agrees to consider  -     gabapentin (NEURONTIN) 400 mg capsule; Take 1 Cap by mouth three (3) times daily. Coagulation disorder ([address]  Needs new referral for hematology- has scheduled f/u for Factor 8 def. -     REFERRAL TO HEMATOLOGY ONCOLOGY       Follow-up Disposition:  Return in about 4 weeks (around 7/7/2017). I have discussed the diagnosis with the patient and the intended plan as seen in the above orders. The patient has received an after-visit summary and questions were answered concerning future plans. The patient verbalizes understanding. Medication Side Effects and Warnings were discussed with patient: yes  Patient Labs were reviewed and or requested: yes  Patient Past Records were reviewed and or requested: yes    Patient Instructions        Getting Back to Normal After Low Back Pain: Care Instructions  Your Care Instructions  Almost everyone has low back pain at some time. The good news is that most low back pain will go away in a few days or weeks with some basic self-care. Some people are afraid that doing too much may make their pain worse. In the past, people stayed in bed, thinking this would help their backs. Now doctors think that, in most cases, getting back to your normal activities is good for your back, as long as you avoid doing things that make your pain worse. Follow-up care is a key part of your treatment and safety. Be sure to make and go to all appointments, and call your doctor if you are having problems. It's also a good idea to know your test results and keep a list of the medicines you take.   How can you care for yourself at home? Ease back into daily activities  · For the first day or two of pain, take it easy. But as soon as possible, get back to your normal daily life and activities. · Get gentle exercise, such as walking. Movement keeps your spine flexible and helps your muscles stay strong. · If you are an athlete, return to your activity carefully. Choose a low-impact option until your pain is under control. Avoid or change activities that cause pain  · Try to avoid too much bending, heavy lifting, or reaching. These movements put extra stress on your back. · In bed, try lying on your side with a pillow between your knees. Or lie on your back on the floor with a pillow under your knees. · When you sit, place a small pillow, a rolled-up towel, or a lumbar roll in the curve of your back for extra support. · Try putting one foot up on a stool or changing positions every few minutes if you have to stand still for a period of time. Pay attention to body mechanics and posture  Body mechanics are the way you use your body. Posture is the way you sit or stand. · Take extra care when you lift. When you must lift, bend your knees and keep your back straight. Avoid twisting, and keep the load close to your body. · Stand or sit tall, with your shoulders back and your stomach pulled in to support your back. Get support when you need it  · Let people know when you need a helping hand. Get family members or friends to help out with tasks you cannot do right now. · Be honest with your doctor about how the pain affects you. · If you have had to take time off work, talk to your doctor and boss about a gradual [id number] plan. Find out if there are other ways you could do your job to avoid hurting your back again. Reduce stress  Worrying about the pain can cause you to tense the muscles in your lower back. This in turn causes more pain.  Here are a few things you can do to relax your mind and your muscles:  · Take 10 to 15 minutes to sit quietly and breathe deeply. Try to focus only on your breathing. If you cannot keep thoughts away, think about things that make you feel good. · Get involved in your favorite hobby, or try something new. · Talk to a friend, read a book, or listen to your favorite music. · Find a counselor you like and trust. Talk openly and honestly about your problems. Be willing to make some changes. When should you call for help? Call 911 anytime you think you may need emergency care. For example, call if:  · You are unable to move a leg at all. Call your doctor now or seek immediate medical care if:  · You have new or worse symptoms in your legs, belly, or buttocks. Symptoms may include:  ¨ Numbness or tingling. ¨ Weakness. ¨ Pain. · You lose bladder or bowel control. Watch closely for changes in your health, and be sure to contact your doctor if:  · You are not getting better as expected. Where can you learn more? Go to http://shell-td.info/. Enter F447 in the search box to learn more about \"Getting Back to Normal After Low Back Pain: Care Instructions. \"  Current as of: May 23, 2016  Content Version: 11.2  © 9129-3653 [employer]. Care instructions adapted under license by [employer] (which disclaims liability or warranty for this information). If you have questions about a medical condition or this instruction, always ask your healthcare professional. [place] [address] any warranty or liability for your use of this information. Fibromyalgia: Care Instructions  Your Care Instructions  Fibromyalgia is a painful condition that is not completely understood by medical experts. The cause of fibromyalgia is not known. It can make you feel tired and ache all over. It causes tender spots at specific points of the body that hurt only when you press on them. You may have trouble sleeping, as well as other symptoms. These problems can upset your work and home life. Symptoms tend to come and go, although they may never go away completely. Fibromyalgia does not harm your muscles, joints, or organs. Follow-up care is a key part of your treatment and safety. Be sure to make and go to all appointments, and call your doctor if you are having problems. It's also a good idea to know your test results and keep a list of the medicines you take. How can you care for yourself at home? · Exercise often. Walk, swim, or bike to help with pain and sleep problems and to make you feel better. · Try to get a good night's sleep. Go to bed and get up at the same time each day, whether you feel rested or not. Make sure you have a good mattress and pillow. · Reduce stress. Avoid things that cause you stress, if you can. If not, work at making them less stressful. Learn to use biofeedback, guided imagery, meditation, or other methods to relax. · Make healthy changes. Eat a balanced diet, quit smoking, and limit alcohol and caffeine. · Use a heating pad set on low or take warm baths or showers for pain. Using cold packs for up to 20 minutes at a time can also relieve pain. Put a thin cloth between the cold pack and your skin. A gentle massage might help too. · Be safe with medicines. Take your medicines exactly as prescribed. Call your doctor if you think you are having a problem with your medicine. Your doctor may talk to you about taking antidepressant medicines. These medicines may improve sleep, relieve pain, and in some cases treat depression. · Learn about fibromyalgia. This makes coping easier. Then, take an active role in your treatment. · Think about joining a support group with others who have fibromyalgia to learn more and get support. When should you call for help?   Watch closely for changes in your health, and be sure to contact your doctor if:  · You feel sad, helpless, or hopeless; lose interest in things you used to enjoy; or have other symptoms of depression. · Your fibromyalgia symptoms get worse. Where can you learn more? Go to http://shell-td.info/. Enter V003 in the search box to learn more about \"Fibromyalgia: Care Instructions. \"  Current as of: October 14, 2016  Content Version: 11.2  © 5860-1578 [employer]. Care instructions adapted under license by [employer] (which disclaims liability or warranty for this information). If you have questions about a medical condition or this instruction, always ask your healthcare professional. [place] [address] any warranty or liability for your use of this information.           Argenis Damico P

## 2017-06-12 ENCOUNTER — DOCUMENTATION ONLY (OUTPATIENT)
Dept: SLEEP MEDICINE | Age: 57
End: 2017-06-12

## 2017-06-15 ENCOUNTER — OFFICE VISIT (OUTPATIENT)
Dept: ONCOLOGY | Age: 57
End: 2017-06-15

## 2017-06-15 VITALS
BODY MASS INDEX: 24.34 KG/M2 | RESPIRATION RATE: 16 BRPM | TEMPERATURE: 96.7 F | HEART RATE: 54 BPM | HEIGHT: 67 IN | OXYGEN SATURATION: 99 % | DIASTOLIC BLOOD PRESSURE: 73 MMHG | WEIGHT: 155.1 LBS | SYSTOLIC BLOOD PRESSURE: 110 MMHG

## 2017-06-15 DIAGNOSIS — I26.99 OTHER ACUTE PULMONARY EMBOLISM WITHOUT ACUTE COR PULMONALE (HCC): Primary | ICD-10-CM

## 2017-06-15 PROBLEM — F32.A DEPRESSION: Status: ACTIVE | Noted: 2017-06-15

## 2017-06-15 NOTE — PROGRESS NOTES
[address] here for f/u appt with MD for blood clot in lung in 2006 was on coumadin for a period of time then was switched to Eliquis when moved to [place] in 2011 due to wanting to try a new medication due to INR was not therapeutic, vitamin K she received once and had a reaction to this. She was taking eliquis and was taking ASA at the same time. She has been off of eliquis and is just on ASA due to protein S was okay. Here to go over this. Pt has an U/S tomorrow for abdominal pains and suspected LN enlargement. She says she has SOB on and off, she also has cardiac issues. Ms. Brandi Segura has a reminder for a \"due or due soon\" health maintenance. I have asked that she contact her primary care provider for follow-up on this health maintenance. Pt see's a psychiatrist for depression.

## 2017-06-15 NOTE — PROGRESS NOTES
Follow-up Note        Patient: Valerie Newton MRN: [id number]  SSN: [id number]    YOB: 1960  Age: 49 y.o. Sex: female      Subjective:      Valerie Newton is a 49 y.o. female with a remote h/o venous thrombosis. She suffered an unprovoked PE in 2006 when living in [facility]. She took oral anticoagulant up until 2016. She is now on low dose aspirin. She is doing well and is asymptomatic.         Review of Systems:    Constitutional: negative  Eyes: negative  Ears, Nose, Mouth, Throat, and Face: negative  Respiratory: negative  Cardiovascular: negative  Gastrointestinal: negative  Genitourinary:negative  Integument/Breast: negative  Hematologic/Lymphatic: negative  Musculoskeletal:negative  Neurological: negative        Past Medical History:   Diagnosis Date    Anal fistula     Asthma     Back ache     CAD (coronary artery disease)     leaky valves    Cholesterol blood decreased     Dementia     Depression     Deviated septum     Diabetes (HCC)     pre diabetese    Fibromyalgia     Frequent headaches     Heart valve disease     Herniated nucleus pulposus, L5-S1     Migraine     Protein S deficiency (HCC)     Pulmonary emboli (HCC)     Pulmonary hypertension (HCC)     Restless leg syndrome     RLS (restless legs syndrome)     Sickle cell trait (HCC)     Sleep apnea     Stroke (HCC)     Upper airway resistance syndrome     Vaginal fistula      Past Surgical History:   Procedure Laterality Date    CARDIAC SURG PROCEDURE UNLIST  9/23/99    closure of unroofed coronary sinus    HX ROTATOR CUFF REPAIR      3/2017      Family History   Problem Relation Age of Onset    Diabetes Mother     Heart Disease Mother     Hypertension Mother     Sickle Cell Anemia Sister     Hypertension Brother     Stroke Maternal Aunt      Social History   Substance Use Topics    Smoking status: Never Smoker    Smokeless tobacco: Never Used    Alcohol use No      Prior to Admission medications Medication Sig Start Date End Date Taking? Authorizing Provider   gabapentin (NEURONTIN) 400 mg capsule Take 1 Cap by mouth three (3) times daily. 6/9/17  Yes Tona Santa NP   fluticasone (FLONASE) 50 mcg/actuation nasal spray 2 Sprays by Both Nostrils route daily. 4/25/17  Yes Sheila Kaufman MD   hydrocortisone (ANUSOL-HC) 2.5 % rectal cream Insert  into rectum four (4) times daily. 4/7/17  Yes Sheila Kaufman MD   PARoxetine (PAXIL) 20 mg tablet Take 1 Tab by mouth daily. 3/29/17  Yes Tona Santa NP   traZODone (DESYREL) 100 mg tablet Take 1 Tab by mouth nightly as needed for Sleep. 2/23/17  Yes Hope Mcarthur NP   buPROPion XL (WELLBUTRIN XL) 150 mg tablet Take 1 Tab by mouth every morning. 2/23/17  Yes Hope Mcarthur NP   simvastatin (ZOCOR) 20 mg tablet Take 1 Tab by mouth nightly. 1/12/17  Yes Tona Santa NP   divalproex DR (DEPAKOTE) 250 mg tablet Take 250 mg by mouth three (3) times daily. Yes Historical Provider   montelukast (SINGULAIR) 10 mg tablet Take 1 Tab by mouth daily. 9/19/16  Yes Sheila Kaufman MD   NAMENDA XR 28 mg capsule One cap once daily. 6/2/16  Yes Historical Provider   aspirin 81 mg chewable tablet Take 81 mg by mouth daily. Yes Historical Provider   Cetirizine 10 mg cap Take  by mouth. Yes Historical Provider   fluticasone-salmeterol (ADVAIR) 500-50 mcg/dose diskus inhaler Take 1 Puff by inhalation every twelve (12) hours. Yes Historical Provider   albuterol (PROVENTIL VENTOLIN) 2.5 mg /3 mL (0.083 %) nebulizer solution by Nebulization route once. Yes Historical Provider   ranitidine (ZANTAC) 150 mg tablet Take 150 mg by mouth two (2) times a day. Yes Historical Provider   topiramate (TOPAMAX) 100 mg tablet Take  by mouth two (2) times a day. Yes Historical Provider   donepezil (ARICEPT) 10 mg tablet Take 10 mg by mouth nightly. Yes Historical Provider   melatonin 3 mg tablet Take  by mouth.    Yes Historical Provider   divalproex ER (DEPAKOTE ER) 250 mg ER tablet take 1 tablet by mouth twice a day 4/17/17   Historical Provider   enoxaparin (LOVENOX) 40 mg/0.4 mL  3/27/17   Historical Provider   oxyCODONE-acetaminophen (PERCOCET 7.5) 7.5-325 mg per tablet take 1 to 2 tablets by mouth every 4 to 6 hours if needed for pain 4/11/17   Historical Provider   simethicone 180 mg cap Take  by mouth. Historical Provider   ARIPiprazole (ABILIFY) 2 mg tablet Take 2 mg by mouth daily. Historical Provider            Allergies   Allergen Reactions    Ambien [Zolpidem] Unknown (comments)     Causes Migraine    Cephalexin Rash    Morphine Rash    Motrin [Ibuprofen] Swelling    Nortriptyline Unknown (comments)     Bleeding from mouth    Protonix [Pantoprazole] Rash    Vicodin [Hydrocodone-Acetaminophen] Unknown (comments)     Causes Migraines    Vitamins For Infusion Unknown (comments)     Pt stated all vitamins cause her lightheadness and sick         Objective:     Vitals:    06/15/17 0941   BP: 110/73   Pulse: (!) 54   Resp: 16   Temp: 96.7 °F (35.9 °C)   TempSrc: Oral   SpO2: 99%   Weight: 155 lb 1.6 oz (70.4 kg)   Height: 5' 7\" (1.702 m)            Physical Exam:    GENERAL: alert, cooperative, no distress  EYE: negative  LYMPHATIC: Cervical, supraclavicular, and axillary nodes normal.   THROAT & NECK: normal and no erythema or exudates noted. LUNG: clear to auscultation bilaterally  HEART: regular rate and rhythm  ABDOMEN: soft, non-tender  EXTREMITIES: no cyanosis or edema  SKIN: Normal.  NEUROLOGIC: negative        Assessment:     1. Pulmonary embolism:    > 1st episode/event of  PE in 2006 - unprovoked     Was on Warfarin until 09/2015  Eliquis from 09/2015 - 03/2016    > Continue ASA 81 mg daily    Hypercoagulable workup normal  Symptom management form reviewed with patient.       Plan:       > Continue low dose ASA  > Follow-up as needed        Signed by: Alanna Velarde MD                     Molly 15, 2017

## 2017-06-16 ENCOUNTER — HOSPITAL ENCOUNTER (OUTPATIENT)
Dept: ULTRASOUND IMAGING | Age: 57
Discharge: HOME | End: 2017-06-16
Attending: NURSE PRACTITIONER
Payer: MEDICARE

## 2017-06-16 ENCOUNTER — HOSPITAL ENCOUNTER (OUTPATIENT)
Dept: MAMMOGRAPHY | Age: 57
Discharge: HOME | End: 2017-06-16
Attending: FAMILY MEDICINE
Payer: MEDICARE

## 2017-06-16 DIAGNOSIS — R19.02 ABDOMINAL MASS, LEFT UPPER QUADRANT: ICD-10-CM

## 2017-06-16 DIAGNOSIS — Z12.39 BREAST CANCER SCREENING: ICD-10-CM

## 2017-06-16 PROCEDURE — 76705 ECHO EXAM OF ABDOMEN: CPT

## 2017-06-16 PROCEDURE — 77067 SCR MAMMO BI INCL CAD: CPT

## 2017-06-16 NOTE — LETTER
7/3/2017 8:54 AM 
 
Ms. Stella Fajardo [address] [place] [id number] Dear Stella Fajardo: 
 
Please find your most recent results below. Resulted Orders [facility] MAMMO BI SCREENING INCL CAD Narrative STUDY: Bilateral digital screening mammogram 
 
INDICATION:  Screening. COMPARISON:  2014 BREAST COMPOSITION:  There are scattered areas of fibroglandular density. FINDINGS: Bilateral digital screening mammography was performed and is 
interpreted in conjunction with a computer assisted detection (CAD) system. No 
suspicious masses or calcifications are identified. There has been no 
significant change. Impression IMPRESSION: 
BI-RADS 1: Negative. No mammographic evidence of malignancy. RECOMMENDATIONS: 
Next screening mammogram is recommended in one year. The patient will be notified of these results. RECOMMENDATIONS: 
None. Keep up the good work! Please call me if you have any questions: [phone] Sincerely, 
 
 
[facility]

## 2017-06-16 NOTE — LETTER
6/21/2017 9:12 AM 
 
Ms. Swapna Barraza [address] [facility] [address] [id number] Dear Swapna Barraza: 
 
Please find your most recent results below. Resulted Orders US GALLBLADDER Narrative INDICATION:  Abdominal wall mass. COMPARISON:  No old study. TECHNIQUE:   
Sonographic real-time images were obtained of the abdominal wall midline without 
and with the Valsalva maneuver. FINDINGS:   
No hernia is identified. No cystic or solid mass is appreciated. Nigel Meraz Impression IMPRESSION:   
No sonographic abnormality detected. RECOMMENDATIONS: 
 
Normal US. Recommend f/u with  [facility] for symptoms. Please call me if you have any questions: [phone] Sincerely, 
 
 
[address] [address] [place] [address]

## 2017-06-20 DIAGNOSIS — E78.00 HYPERCHOLESTEREMIA: ICD-10-CM

## 2017-06-21 RX ORDER — SIMVASTATIN 20 MG/1
20 TABLET, FILM COATED ORAL
Qty: 90 TAB | Refills: 1 | Status: SHIPPED | OUTPATIENT
Start: 2017-06-21 | End: 2018-03-16 | Stop reason: SDUPTHER

## 2017-06-27 ENCOUNTER — TELEPHONE (OUTPATIENT)
Dept: SLEEP MEDICINE | Age: 57
End: 2017-06-27

## 2017-06-27 NOTE — TELEPHONE ENCOUNTER
Patient called to check on status of cpap device per [address] [facility] Patient has been trying to call her and she was unsure if she should speak with them since she was switched [facility]. I called [facility] and they stated have been trying to contact patient to get her scheduled for setup. Advise patient of this and she stated will continue with [facility] and will cancel order with [facility]. Patient will call [facility] Patient to schedule setup and I spoke with Dawit Liz from [facility] who cancelled her order with them.

## 2017-07-03 ENCOUNTER — TELEPHONE (OUTPATIENT)
Dept: SLEEP MEDICINE | Age: 57
End: 2017-07-03

## 2017-07-03 ENCOUNTER — OFFICE VISIT (OUTPATIENT)
Dept: FAMILY MEDICINE CLINIC | Age: 57
End: 2017-07-03

## 2017-07-03 VITALS
RESPIRATION RATE: 16 BRPM | TEMPERATURE: 98.4 F | HEIGHT: 67 IN | OXYGEN SATURATION: 97 % | HEART RATE: 59 BPM | SYSTOLIC BLOOD PRESSURE: 101 MMHG | DIASTOLIC BLOOD PRESSURE: 62 MMHG | BODY MASS INDEX: 23.97 KG/M2 | WEIGHT: 152.7 LBS

## 2017-07-03 DIAGNOSIS — Q24.8 UNROOFED CORONARY SINUS: Primary | ICD-10-CM

## 2017-07-03 DIAGNOSIS — I38 VALVULAR DISEASE: ICD-10-CM

## 2017-07-03 DIAGNOSIS — N82.3 RECTAL VAGINAL FISTULA: ICD-10-CM

## 2017-07-03 NOTE — PROGRESS NOTES
1. Have you been to the ER, urgent care clinic since your last visit? Hospitalized since your last visit? No    2. Have you seen or consulted any other health care providers outside of the [address] [place] [address] since your last visit? Include any pap smears or colon screening. No     Chief Complaint   Patient presents with    Urethral Fistula     Pt. States stool coming from vaginal and rectum. 2 episodes within 6 weeks.

## 2017-07-03 NOTE — PATIENT INSTRUCTIONS
Viral Respiratory Infection: Care Instructions  Your Care Instructions  Viruses are very small organisms. They grow in number after they enter your body. There are many types that cause different illnesses, such as colds and the mumps. The symptoms of a viral respiratory infection often start quickly. They include a fever, sore throat, and runny nose. You may also just not feel well. Or you may not want to eat much. Most viral respiratory infections are not serious. They usually get better with time and self-care. Antibiotics are not used to treat a viral infection. That's because antibiotics will not help cure a viral illness. In some cases, antiviral medicine can help your body fight a serious viral infection. Follow-up care is a key part of your treatment and safety. Be sure to make and go to all appointments, and call your doctor if you are having problems. It's also a good idea to know your test results and keep a list of the medicines you take. How can you care for yourself at home? · Rest as much as possible until you feel better. · Be safe with medicines. Take your medicine exactly as prescribed. Call your doctor if you think you are having a problem with your medicine. You will get more details on the specific medicine your doctor prescribes. · Take an over-the-counter pain medicine, such as acetaminophen (Tylenol), ibuprofen (Advil, Motrin), or naproxen (Aleve), as needed for pain and fever. Read and follow all instructions on the label. Do not give aspirin to anyone younger than 20. It has been linked to Reye syndrome, a serious illness. · Drink plenty of fluids, enough so that your urine is light yellow or clear like water. Hot fluids, such as tea or soup, may help relieve congestion in your nose and throat. If you have kidney, heart, or liver disease and have to limit fluids, talk with your doctor before you increase the amount of fluids you drink.   · Try to clear mucus from your lungs by breathing deeply and coughing. · Gargle with warm salt water once an hour. This can help reduce swelling and throat pain. Use 1 teaspoon of salt mixed in 1 cup of warm water. · Do not smoke or allow others to smoke around you. If you need help quitting, talk to your doctor about stop-smoking programs and medicines. These can increase your chances of quitting for good. To avoid spreading the virus  · Cough or sneeze into a tissue. Then throw the tissue away. · If you don't have a tissue, use your hand to cover your cough or sneeze. Then clean your hand. You can also cough into your sleeve. · Wash your hands often. Use soap and warm water. Wash for 15 to 20 seconds each time. · If you don't have soap and water near you, you can clean your hands with alcohol wipes or gel. When should you call for help? Call your doctor now or seek immediate medical care if:  · You have a new or higher fever. · Your fever lasts more than 48 hours. · You have trouble breathing. · You have a fever with a stiff neck or a severe headache. · You are sensitive to light. · You feel very sleepy or confused. Watch closely for changes in your health, and be sure to contact your doctor if:  · You do not get better as expected. Where can you learn more? Go to http://shell-td.info/. Enter I359 in the search box to learn more about \"Viral Respiratory Infection: Care Instructions. \"  Current as of: March 25, 2017  Content Version: 11.3  © 9591-6111 [employer]. Care instructions adapted under license by [employer] (which disclaims liability or warranty for this information). If you have questions about a medical condition or this instruction, always ask your healthcare professional. [address] any warranty or liability for your use of this information.

## 2017-07-03 NOTE — TELEPHONE ENCOUNTER
Rene Friedman from [facility] called to inform that patient has called their office in regards to a letter from care more stating [facility] has been sent authorization for CPAP, per conversation on 6/27/17 [facility] patient received authorization and patient was to call and schedule with them for setup. Rene Friedman states confusing because per patient requested order to be cancelled with [facility] on 6/27/17 and I cancelled order with Rene Friedman per patient decided to stay with [address] Patient, called [facility] patient and spoke with Albino Zazueta who also became confused because patient was scheduled for setup today 7/3/17 at 9 am but as she looked further into notes, insurance is requiring additional information and also referred patient to [place] [employer] [place] but sent letter that [facility] has been given authorization. Arlene Bond will have Care more contact our office to explain further detailed. Notified Rene Friedman at [facility] to give him update.  Patient has been scheduled with [facility] at 1 pm with Mitali Vital on 7/7/17

## 2017-07-03 NOTE — MR AVS SNAPSHOT
Visit Information Date & Time Provider Department Dept. Phone Encounter #  
 7/3/2017  9:30 AM Ramila Couch MD [facility] [id number] Your Appointments 7/7/2017  9:30 AM  
ROUTINE CARE with Ramila Couch MD  
[place]. Lio Mcintosh [address]) Appt Note: follow up [phone]  
[place] [phone]  
  
    
 7/27/2017 10:00 AM  
Any with Senait Amaral MD  
[address] ([address]) Appt Note: 1st adherence [address] [facility] 99 [id number] 9[address] [facility] [phone] Upcoming Health Maintenance Date Due Hepatitis C Screening 1960 FOBT Q 1 YEAR AGE 50-75 3/22/2017 INFLUENZA AGE 9 TO ADULT 8/1/2017 MEDICARE YEARLY EXAM 12/14/2017 BREAST CANCER SCRN MAMMOGRAM 6/16/2019 PAP AKA CERVICAL CYTOLOGY 12/13/2019 DTaP/Tdap/Td series (2 - Td) 12/13/2026 Allergies as of 7/3/2017  Review Complete On: 7/3/2017 By: Ramila Couch MD  
  
 Severity Noted Reaction Type Reactions Ambien [Zolpidem]  09/09/2016    Unknown (comments) Causes Migraine Cephalexin  03/17/2016    Rash Morphine  09/01/2015    Rash Motrin [Ibuprofen]  03/11/2015    Swelling Nortriptyline  09/09/2016    Unknown (comments) Bleeding from mouth Pepcid [Famotidine]  07/03/2017    Other (comments) Migraines. Protonix [Pantoprazole]  03/17/2016    Rash Vicodin [Hydrocodone-acetaminophen]  09/09/2016    Unknown (comments) Causes Migraines Vitamins For Infusion  09/09/2016    Unknown (comments) Pt stated all vitamins cause her lightheadness and sick Current Immunizations  Reviewed on 6/15/2017 Name Date Influenza High Dose Vaccine PF 8/15/2016 Influenza Vaccine 9/1/2015 Pneumococcal Vaccine (Unspecified Type) 9/1/2015 Tdap 12/13/2016 Zoster Vaccine, Live 6/13/2017 Not reviewed this visit You Were Diagnosed With   
  
 Codes Comments Unroofed coronary sinus    -  Primary ICD-10-CM: Q24.8 ICD-9-CM: 746.89 Valvular disease     ICD-10-CM: I38 
ICD-9-CM: 424.90 Rectal vaginal fistula     ICD-10-CM: N82.3 ICD-9-CM: 619.1 Vitals BP Pulse Temp Resp Height(growth percentile) Weight(growth percentile) 101/62 (!) 59 98.4 °F (36.9 °C) (Oral) 16 5' 7\" (1.702 m) 152 lb 11.2 oz (69.3 kg) SpO2 BMI OB Status Smoking Status 97% 23.92 kg/m2 Hysterectomy Never Smoker Vitals History BMI and BSA Data Body Mass Index Body Surface Area  
 23.92 kg/m 2 1.81 m 2 Preferred Pharmacy Pharmacy Name Phone [facility] [address] Constantino Martinez [phone] Your Updated Medication List  
  
   
This list is accurate as of: 7/3/17 10:32 AM.  Always use your most recent med list.  
  
  
  
  
 albuterol 2.5 mg /3 mL (0.083 %) nebulizer solution Commonly known as:  PROVENTIL VENTOLIN  
by Nebulization route once. aspirin 81 mg chewable tablet Take 81 mg by mouth daily. buPROPion  mg tablet Commonly known as:  [place] [employer] Take 1 Tab by mouth every morning. Cetirizine 10 mg Cap Take  by mouth. DEPAKOTE 250 mg tablet Generic drug:  divalproex DR Take 250 mg by mouth three (3) times daily. donepezil 10 mg tablet Commonly known as:  ARICEPT Take 10 mg by mouth nightly. fluticasone 50 mcg/actuation nasal spray Commonly known as:  Olimpia Austyn 2 Sprays by Both Nostrils route daily. fluticasone-salmeterol 500-50 mcg/dose diskus inhaler Commonly known as:  ADVAIR Take 1 Puff by inhalation every twelve (12) hours. gabapentin 400 mg capsule Commonly known as:  NEURONTIN Take 1 Cap by mouth three (3) times daily. hydrocortisone 2.5 % rectal cream  
Commonly known as:  ANUSOL-HC Insert  into rectum four (4) times daily. melatonin 3 mg tablet Take  by mouth.  
  
 montelukast 10 mg tablet Commonly known as:  SINGULAIR Take 1 Tab by mouth daily. NAMENDA XR 28 mg capsule Generic drug:  memantine ER One cap once daily. PARoxetine 20 mg tablet Commonly known as:  PAXIL Take 1 Tab by mouth daily. raNITIdine 150 mg tablet Commonly known as:  ZANTAC Take 150 mg by mouth two (2) times a day. simvastatin 20 mg tablet Commonly known as:  ZOCOR Take 1 Tab by mouth nightly. topiramate 100 mg tablet Commonly known as:  TOPAMAX Take  by mouth two (2) times a day. traZODone 100 mg tablet Commonly known as:  Hayden Erlin Take 1 Tab by mouth nightly as needed for Sleep. We Performed the Following REFERRAL TO CARDIOLOGY [PVT72 Custom] Comments:  
 Dr. Alexx Richey Cardiologist in [place], [place] Address: [address] [place] [address],[address], [place], [address] [place] [address] Phone: [phone] Continue care for valve disease. REFERRAL TO COLON AND RECTAL SURGERY [REF17 Custom] Comments:  
 Please evaluate patient for recent recurrence of stool exiting from both the vagina and colon. Please eval and treay. Referral Information Referral ID Referred By Referred To  
  
 [id number] Blanquita Barrios [facility] [address] [facility] [address] [place], [facility] [address] Visits Status Start Date End Date 1 New Request 7/3/17 7/3/18 If your referral has a status of pending review or denied, additional information will be sent to support the outcome of this decision. Referral ID Referred By Referred To  
 [id number] Blanquita Brarios Not Available Visits Status Start Date End Date 1 New Request 7/3/17 7/3/18  If your referral has a status of pending review or denied, additional information will be sent to support the outcome of this decision. Patient Instructions Viral Respiratory Infection: Care Instructions Your Care Instructions Viruses are very small organisms. They grow in number after they enter your body. There are many types that cause different illnesses, such as colds and the mumps. The symptoms of a viral respiratory infection often start quickly. They include a fever, sore throat, and runny nose. You may also just not feel well. Or you may not want to eat much. Most viral respiratory infections are not serious. They usually get better with time and self-care. Antibiotics are not used to treat a viral infection. That's because antibiotics will not help cure a viral illness. In some cases, antiviral medicine can help your body fight a serious viral infection. Follow-up care is a key part of your treatment and safety. Be sure to make and go to all appointments, and call your doctor if you are having problems. It's also a good idea to know your test results and keep a list of the medicines you take. How can you care for yourself at home? · Rest as much as possible until you feel better. · Be safe with medicines. Take your medicine exactly as prescribed. Call your doctor if you think you are having a problem with your medicine. You will get more details on the specific medicine your doctor prescribes. · Take an over-the-counter pain medicine, such as acetaminophen (Tylenol), ibuprofen (Advil, Motrin), or naproxen (Aleve), as needed for pain and fever. Read and follow all instructions on the label. Do not give aspirin to anyone younger than 20. It has been linked to Reye syndrome, a serious illness. · Drink plenty of fluids, enough so that your urine is light yellow or clear like water. Hot fluids, such as tea or soup, may help relieve congestion in your nose and throat.  If you have kidney, heart, or liver disease and have to limit fluids, talk with your doctor before you increase the amount of fluids you drink. · Try to clear mucus from your lungs by breathing deeply and coughing. · Gargle with warm salt water once an hour. This can help reduce swelling and throat pain. Use 1 teaspoon of salt mixed in 1 cup of warm water. · Do not smoke or allow others to smoke around you. If you need help quitting, talk to your doctor about stop-smoking programs and medicines. These can increase your chances of quitting for good. To avoid spreading the virus · Cough or sneeze into a tissue. Then throw the tissue away. · If you don't have a tissue, use your hand to cover your cough or sneeze. Then clean your hand. You can also cough into your sleeve. · Wash your hands often. Use soap and warm water. Wash for 15 to 20 seconds each time. · If you don't have soap and water near you, you can clean your hands with alcohol wipes or gel. When should you call for help? Call your doctor now or seek immediate medical care if: 
· You have a new or higher fever. · Your fever lasts more than 48 hours. · You have trouble breathing. · You have a fever with a stiff neck or a severe headache. · You are sensitive to light. · You feel very sleepy or confused. Watch closely for changes in your health, and be sure to contact your doctor if: 
· You do not get better as expected. Where can you learn more? Go to http://shell-td.info/. Enter V787 in the search box to learn more about \"Viral Respiratory Infection: Care Instructions. \" Current as of: March 25, 2017 Content Version: 11.3 © 9368-0684 [employer] (fuelpowered[employer]). Care instructions adapted under license by [employer] (which disclaims liability or warranty for this information).  If you have questions about a medical condition or this instruction, always ask your healthcare professional. Jarad Montesinos[employer] disclaims any warranty or liability for your use of this information. Introducing [facility]! Nick Zhang introduces Finario patient portal. Now you can access parts of your medical record, email your doctor's office, and request medication refills online. 1. In your internet browser, go to [email or web address] 2. Click on the First Time User? Click Here link in the Sign In box. You will see the New Member Sign Up page. 3. Enter your Finario Access Code exactly as it appears below. You will not need to use this code after youve completed the sign-up process. If you do not sign up before the expiration date, you must request a new code. · Finario Access Code: [email or web address] Expires: 7/6/2017  9:58 AM 
 
4. Enter the last four digits of your Social Security Number (xxxx) and Date of Birth (mm/dd/yyyy) as indicated and click Submit. You will be taken to the next sign-up page. 5. Create a Finario ID. This will be your Finario login ID and cannot be changed, so think of one that is secure and easy to remember. 6. Create a Finario password. You can change your password at any time. 7. Enter your Password Reset Question and Answer. This can be used at a later time if you forget your password. 8. Enter your e-mail address. You will receive e-mail notification when new information is available in [address] 9. Click Sign Up. You can now view and download portions of your medical record. 10. Click the Download Summary menu link to download a portable copy of your medical information. If you have questions, please visit the Frequently Asked Questions section of the Finario website. Remember, Finario is NOT to be used for urgent needs. For medical emergencies, dial 911. Now available from your iPhone and Android! Please provide this summary of care documentation to your next provider. Your primary care clinician is listed as Skylar Valdes. If you have any questions after today's visit, please call [phone].

## 2017-07-03 NOTE — PROGRESS NOTES
Patient has been advised of normal mammogram repeat in one year. Patient verbalized understanding and had no questions at this time.  Mailed to pt home per request.

## 2017-07-03 NOTE — PROGRESS NOTES
Chief Complaint   Patient presents with    Urethral Fistula    Referral Request     [facility] with Dr. Aundrea Hamilton     she is a 49y.o. year old female who presents for evalution. Recto/vaginal fistula. She has stool coming from the vagina as well as the anus. This started Thursday nigh and happened again frday. She also had an episode 6 weeks ago. She was supposed to have surgery 4 years ago but was not able due to other health issue. She has an appt to see cardiology for valve disease and she needs a referral today for the next appt  Her appt JUly 14th  With Dr Aundrea Hamilton  Reviewed PmHx, RxHx, FmHx, SocHx, AllgHx and updated and dated in the chart.     Patient Active Problem List    Diagnosis    Rectal vaginal fistula    Valvular disease    Depression     She sees a psychiatrist she says      Prediabetes    Seasonal allergic rhinitis    Mild intermittent asthma without complication    Unroofed coronary sinus    Pulmonary embolism ([facility] [address]       Nurse notes were reviewed and copied and are correct  Review of Systems - negative except as listed above in the HPI    Objective:     Vitals:    07/03/17 0951   BP: 101/62   Pulse: (!) 59   Resp: 16   Temp: 98.4 °F (36.9 °C)   TempSrc: Oral   SpO2: 97%   Weight: 152 lb 11.2 oz (69.3 kg)   Height: 5' 7\" (1.702 m)     Physical Examination: General appearance - alert, well appearing, and in no distress  Mental status - alert, oriented to person, place, and time  Mouth - mucous membranes moist, pharynx normal without lesions  Neck - supple, no significant adenopathy  Chest - clear to auscultation, no wheezes, rales or rhonchi, symmetric air entry  Heart - normal rate, regular rhythm, normal S1, S2, no murmurs, rubs, clicks or gallops  Abdomen - soft, nontender, nondistended, no masses or organomegaly   Ext: no edema      Assessment/ Plan:   Rekha Woods was seen today for urethral fistula and referral request.    Diagnoses and all orders for this visit:    Unroofed coronary sinus  Followed in cardiology  Valvular disease  Followed in cardiology  Rectal vaginal fistula   refer to colo-rectal surgery  Follow-up Disposition:  Return if symptoms worsen or fail to improve. ICD-10-CM ICD-9-CM    1. 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 746.89    2. Valvular disease I38 424.90 REFERRAL TO CARDIOLOGY   3. Rectal vaginal fistula N82.3 619.1 REFERRAL TO COLON AND RECTAL SURGERY       I have discussed the diagnosis with the patient and the intended plan as seen in the above orders. The patient has received an after-visit summary and questions were answered concerning future plans. Medication Side Effects and Warnings were discussed with patient: yes  Patient Labs were reviewed and or requested: yes  Patient Past Records were reviewed and or requested: yes        Patient Instructions        Viral Respiratory Infection: Care Instructions  Your Care Instructions  Viruses are very small organisms. They grow in number after they enter your body. There are many types that cause different illnesses, such as colds and the mumps. The symptoms of a viral respiratory infection often start quickly. They include a fever, sore throat, and runny nose. You may also just not feel well. Or you may not want to eat much. Most viral respiratory infections are not serious. They usually get better with time and self-care. Antibiotics are not used to treat a viral infection. That's because antibiotics will not help cure a viral illness. In some cases, antiviral medicine can help your body fight a serious viral infection. Follow-up care is a key part of your treatment and safety. Be sure to make and go to all appointments, and call your doctor if you are having problems. It's also a good idea to know your test results and keep a list of the medicines you take. How can you care for yourself at home? · Rest as much as possible until you feel better. · Be safe with medicines.  Take your medicine exactly as prescribed. Call your doctor if you think you are having a problem with your medicine. You will get more details on the specific medicine your doctor prescribes. · Take an over-the-counter pain medicine, such as acetaminophen (Tylenol), ibuprofen (Advil, Motrin), or naproxen (Aleve), as needed for pain and fever. Read and follow all instructions on the label. Do not give aspirin to anyone younger than 20. It has been linked to Reye syndrome, a serious illness. · Drink plenty of fluids, enough so that your urine is light yellow or clear like water. Hot fluids, such as tea or soup, may help relieve congestion in your nose and throat. If you have kidney, heart, or liver disease and have to limit fluids, talk with your doctor before you increase the amount of fluids you drink. · Try to clear mucus from your lungs by breathing deeply and coughing. · Gargle with warm salt water once an hour. This can help reduce swelling and throat pain. Use 1 teaspoon of salt mixed in 1 cup of warm water. · Do not smoke or allow others to smoke around you. If you need help quitting, talk to your doctor about stop-smoking programs and medicines. These can increase your chances of quitting for good. To avoid spreading the virus  · Cough or sneeze into a tissue. Then throw the tissue away. · If you don't have a tissue, use your hand to cover your cough or sneeze. Then clean your hand. You can also cough into your sleeve. · Wash your hands often. Use soap and warm water. Wash for 15 to 20 seconds each time. · If you don't have soap and water near you, you can clean your hands with alcohol wipes or gel. When should you call for help? Call your doctor now or seek immediate medical care if:  · You have a new or higher fever. · Your fever lasts more than 48 hours. · You have trouble breathing. · You have a fever with a stiff neck or a severe headache. · You are sensitive to light.   · You feel very sleepy or confused. Watch closely for changes in your health, and be sure to contact your doctor if:  · You do not get better as expected. Where can you learn more? Go to http://shell-td.info/. Enter N547 in the search box to learn more about \"Viral Respiratory Infection: Care Instructions. \"  Current as of: March 25, 2017  Content Version: 11.3  © 4050-2926 [employer]. Care instructions adapted under license by [employer] (which disclaims liability or warranty for this information). If you have questions about a medical condition or this instruction, always ask your healthcare professional. [place] [address] any warranty or liability for your use of this information.         The patient verbalizes understanding and agrees with the plan of care        Patient has the advanced directives booklet to review

## 2017-07-13 ENCOUNTER — DOCUMENTATION ONLY (OUTPATIENT)
Dept: SLEEP MEDICINE | Age: 57
End: 2017-07-13

## 2017-07-25 DIAGNOSIS — M54.50 ACUTE BILATERAL LOW BACK PAIN WITHOUT SCIATICA: ICD-10-CM

## 2017-07-25 DIAGNOSIS — M79.7 FIBROMYALGIA: ICD-10-CM

## 2017-07-27 ENCOUNTER — OFFICE VISIT (OUTPATIENT)
Dept: SLEEP MEDICINE | Age: 57
End: 2017-07-27

## 2017-07-27 VITALS
WEIGHT: 158 LBS | TEMPERATURE: 98.2 F | OXYGEN SATURATION: 100 % | HEART RATE: 58 BPM | DIASTOLIC BLOOD PRESSURE: 82 MMHG | HEIGHT: 67 IN | SYSTOLIC BLOOD PRESSURE: 135 MMHG | BODY MASS INDEX: 24.8 KG/M2

## 2017-07-27 DIAGNOSIS — G47.33 OSA (OBSTRUCTIVE SLEEP APNEA): Primary | ICD-10-CM

## 2017-07-27 DIAGNOSIS — F41.9 HYPOSOMNIA, INSOMNIA, OR SLEEPLESSNESS ASSOCIATED WITH ANXIETY: ICD-10-CM

## 2017-07-27 DIAGNOSIS — F51.05 HYPOSOMNIA, INSOMNIA, OR SLEEPLESSNESS ASSOCIATED WITH ANXIETY: ICD-10-CM

## 2017-07-27 NOTE — MR AVS SNAPSHOT
Visit Information Date & Time Provider Department Dept. Phone Encounter #  
 7/27/2017 10:00 AM Heladio Cunningham MD [place] [address] [id number] Follow-up Instructions Return if symptoms worsen or fail to improve. Follow-up and Disposition History Upcoming Health Maintenance Date Due Hepatitis C Screening 1960 FOBT Q 1 YEAR AGE 50-75 3/22/2017 INFLUENZA AGE 9 TO ADULT 8/1/2017 MEDICARE YEARLY EXAM 12/14/2017 BREAST CANCER SCRN MAMMOGRAM 6/16/2019 PAP AKA CERVICAL CYTOLOGY 12/13/2019 DTaP/Tdap/Td series (2 - Td) 12/13/2026 Allergies as of 7/27/2017  Review Complete On: 7/27/2017 By: Heladio Cunningham MD  
  
 Severity Noted Reaction Type Reactions Ambien [Zolpidem]  09/09/2016    Unknown (comments) Causes Migraine Cephalexin  03/17/2016    Rash Morphine  09/01/2015    Rash Motrin [Ibuprofen]  03/11/2015    Swelling Nortriptyline  09/09/2016    Unknown (comments) Bleeding from mouth Pepcid [Famotidine]  07/03/2017    Other (comments) Migraines. Protonix [Pantoprazole]  03/17/2016    Rash Vicodin [Hydrocodone-acetaminophen]  09/09/2016    Unknown (comments) Causes Migraines Vitamins For Infusion  09/09/2016    Unknown (comments) Pt stated all vitamins cause her lightheadness and sick Current Immunizations  Reviewed on 6/15/2017 Name Date Influenza High Dose Vaccine PF 8/15/2016 Influenza Vaccine 9/1/2015 Pneumococcal Vaccine (Unspecified Type) 9/1/2015 Tdap 12/13/2016 Zoster Vaccine, Live 6/13/2017 Not reviewed this visit You Were Diagnosed With   
  
 Codes Comments DANIEL (obstructive sleep apnea)    -  Primary ICD-10-CM: G47.33 
ICD-9-CM: 327.23 Hyposomnia, insomnia, or sleeplessness associated with anxiety     ICD-10-CM: F51.05, F41.9 ICD-9-CM: 293.84, 327.02 Vitals BP Pulse Temp Height(growth percentile) Weight(growth percentile) SpO2  
 135/82 (!) 58 98.2 °F (36.8 °C) 5' 7\" (1.702 m) 158 lb (71.7 kg) 100% BMI OB Status Smoking Status 24.75 kg/m2 Hysterectomy Never Smoker Vitals History BMI and BSA Data Body Mass Index Body Surface Area 24.75 kg/m 2 1.84 m 2 Preferred Pharmacy Pharmacy Name Phone [facility] [address] [place] Jeff Liu [phone] Your Updated Medication List  
  
   
This list is accurate as of: 7/27/17 10:37 AM.  Always use your most recent med list.  
  
  
  
  
 albuterol 2.5 mg /3 mL (0.083 %) nebulizer solution Commonly known as:  PROVENTIL VENTOLIN  
by Nebulization route once. aspirin 81 mg chewable tablet Take 81 mg by mouth daily. buPROPion  mg tablet Commonly known as:  Rachel Fling Take 1 Tab by mouth every morning. Cetirizine 10 mg Cap Take  by mouth. DEPAKOTE 250 mg tablet Generic drug:  divalproex DR Take 250 mg by mouth three (3) times daily. donepezil 10 mg tablet Commonly known as:  ARICEPT Take 10 mg by mouth nightly. fluticasone 50 mcg/actuation nasal spray Commonly known as:  Fabián Gifty 2 Sprays by Both Nostrils route daily. fluticasone-salmeterol 500-50 mcg/dose diskus inhaler Commonly known as:  ADVAIR Take 1 Puff by inhalation every twelve (12) hours. gabapentin 400 mg capsule Commonly known as:  NEURONTIN Take 1 Cap by mouth three (3) times daily. hydrocortisone 2.5 % rectal cream  
Commonly known as:  ANUSOL-HC Insert  into rectum four (4) times daily. melatonin 3 mg tablet Take  by mouth.  
  
 montelukast 10 mg tablet Commonly known as:  SINGULAIR Take 1 Tab by mouth daily. NAMENDA XR 28 mg capsule Generic drug:  memantine ER One cap once daily. PARoxetine 20 mg tablet Commonly known as:  PAXIL Take 1 Tab by mouth daily. raNITIdine 150 mg tablet Commonly known as:  ZANTAC Take 150 mg by mouth two (2) times a day. simvastatin 20 mg tablet Commonly known as:  ZOCOR Take 1 Tab by mouth nightly. topiramate 100 mg tablet Commonly known as:  TOPAMAX Take  by mouth two (2) times a day. traZODone 100 mg tablet Commonly known as:  Rico Harrier Take 1 Tab by mouth nightly as needed for Sleep. Follow-up Instructions Return if symptoms worsen or fail to improve. Patient Instructions [address] [place]. [address] [facility], [address] Tel.  [phone] Fax. 100 [facility] 60 [address] [facility] [place], [address] [employer] Tel.  [phone] Fax. [phone] [address] [place] [facility] Tel.  [phone] Fax. [phone] Learning About CPAP for Sleep Apnea What is CPAP? CPAP is a small machine that you use at home every night while you sleep. It increases air pressure in your throat to keep your airway open. When you have sleep apnea, this can help you sleep better so you feel much better. CPAP stands for \"continuous positive airway pressure. \" The CPAP machine will have one of the following: · A mask that covers your nose and mouth · Prongs that fit into your nose · A mask that covers your nose only, the most common type. This type is called NCPAP. The N stands for \"nasal.\" Why is it done? CPAP is usually the best treatment for obstructive sleep apnea. It is the first treatment choice and the most widely used. Your doctor may suggest CPAP if you have: · Moderate to severe sleep apnea. · Sleep apnea and coronary artery disease (CAD) or heart failure. How does it help? · CPAP can help you have more normal sleep, so you feel less sleepy and more alert during the daytime. · CPAP may help keep heart failure or other heart problems from getting worse. · NCPAP may help lower your blood pressure. · If you use CPAP, your bed partner may also sleep better because you are not snoring or restless. What are the side effects? Some people who use CPAP have: · A dry or stuffy nose and a sore throat. · Irritated skin on the face. · Sore eyes. · Bloating. If you have any of these problems, work with your doctor to fix them. Here are some things you can try: · Be sure the mask or nasal prongs fit well. · See if your doctor can adjust the pressure of your CPAP. · If your nose is dry, try a humidifier. · If your nose is runny or stuffy, try decongestant medicine or a steroid nasal spray. If these things do not help, you might try a different type of machine. Some machines have air pressure that adjusts on its own. Others have air pressures that are different when you breathe in than when you breathe out. This may reduce discomfort caused by too much pressure in your nose. Where can you learn more? Go to WinWeb.be Enter U440 in the search box to learn more about \"Learning About CPAP for Sleep Apnea. \"  
© 6970-0634 [employer]. Care instructions adapted under license by Nick Zhang (which disclaims liability or warranty for this information). This care instruction is for use with your licensed healthcare professional. If you have questions about a medical condition or this instruction, always ask your healthcare professional. [place] [address] any warranty or liability for your use of this information. Content Version: 8.9.65094; Last Revised: January 11, 2010 PROPER SLEEP HYGIENE What to avoid · Do not have drinks with caffeine, such as coffee or black tea, for 8 hours before bed. · Do not smoke or use other types of tobacco near bedtime. Nicotine is a stimulant and can keep you awake. · Avoid drinking alcohol late in the evening, because it can cause you to wake in the middle of the night. · Do not eat a big meal close to bedtime. If you are hungry, eat a light snack. · Do not drink a lot of water close to bedtime, because the need to urinate may wake you up during the night. · Do not read or watch TV in bed. Use the bed only for sleeping and sexual activity. What to try · Go to bed at the same time every night, and wake up at the same time every morning. Do not take naps during the day. · Keep your bedroom quiet, dark, and cool. · Get regular exercise, but not within 3 to 4 hours of your bedtime. Tabby Scott · Sleep on a comfortable pillow and mattress. · If watching the clock makes you anxious, turn it facing away from you so you cannot see the time. · If you worry when you lie down, start a worry book. Well before bedtime, write down your worries, and then set the book and your concerns aside. · Try meditation or other relaxation techniques before you go to bed. · If you cannot fall asleep, get up and go to another room until you feel sleepy. Do something relaxing. Repeat your bedtime routine before you go to bed again. · Make your house quiet and calm about an hour before bedtime. Turn down the lights, turn off the TV, log off the computer, and turn down the volume on music. This can help you relax after a busy day. Drowsy Driving: The [place] [address] cites drowsiness as a causing factor in more than 092,512 police reported crashes annually, resulting in 76,000 injuries and 1,500 deaths. Other surveys suggest 55% of people polled have driven while drowsy in the past year, 23% had fallen asleep but not crashed, 3% crashed, and 2% had and accident due to drowsy driving. Who is at risk? Young Drivers: One study of drowsy driving accidents states that 55% of the drivers were under 25 years. Of those, 75% were male.   
Shift Workers and Travelers: People who work overnight or travel across time zones frequently are at higher risk of experiencing Circadian Rhythm Disorders. They are trying to work and function when their body is programed to sleep. Sleep Deprived: Lack of sleep has a serious impact on your ability to pay attention or focus on a task. Consistently getting less than the average of 8 hours your body needs creates partial or cumulative sleep deprivation. Untreated Sleep Disorders: Sleep Apnea, Narcolepsy, R.L.S., and other sleep disorders (untreated) prevent a person from getting enough restful sleep. This leads to excessive daytime sleepiness and increases the risk for drowsy driving accidents by up to 7 times. Medications / Alcohol: Even over the counter medications can cause drowsiness. Medications that impair a drivers attention should have a warning label. Alcohol naturally makes you sleepy and on its own can cause accidents. Combined with excessive drowsiness its effects are amplified. Signs of Drowsy Driving: * You don't remember driving the last few miles * You may drift out of your mateo * You are unable to focus and your thoughts wander * You may yawn more often than normal 
 * You have difficulty keeping your eyes open / nodding off * Missing traffic signs, speeding, or tailgating Prevention-  
Good sleep hygiene, lifestyle and behavioral choices have the most impact on drowsy driving. There is no substitute for sleep and the average person requires 8 hours nightly. If you find yourself driving drowsy, stop and sleep. Consider the sleep hygiene tips provided during your visit as well. Medication Refill Policy: Refills for all medications require 1 week advance notice. Please have your pharmacy fax a refill request. We are unable to fax, or call in \"controled substance\" medications and you will need to pick these prescriptions up from our office. [employer] Activation Thank you for requesting access to [employer].  Please follow the instructions below to securely access and download your online medical record. Sanswire allows you to send messages to your doctor, view your test results, renew your prescriptions, schedule appointments, and more. How Do I Sign Up? 1. In your internet browser, go to [email or web address]. 2. Click on the First Time User? Click Here link in the Sign In box. You will see the New Member Sign Up page. 3. Enter your Sanswire Access Code exactly as it appears below. You will not need to use this code after youve completed the sign-up process. If you do not sign up before the expiration date, you must request a new code. Sanswire Access Code: [email or web address] Expires: 10/25/2017 10:31 AM (This is the date your Sanswire access code will ) 4. Enter the last four digits of your Social Security Number (xxxx) and Date of Birth (mm/dd/yyyy) as indicated and click Submit. You will be taken to the next sign-up page. 5. Create a Sanswire ID. This will be your Sanswire login ID and cannot be changed, so think of one that is secure and easy to remember. 6. Create a Sanswire password. You can change your password at any time. 7. Enter your Password Reset Question and Answer. This can be used at a later time if you forget your password. 8. Enter your e-mail address. You will receive e-mail notification when new information is available in [address] 9. Click Sign Up. You can now view and download portions of your medical record. 10. Click the Download Summary menu link to download a portable copy of your medical information. Additional Information If you have questions, please call [phone]. Remember, Sanswire is NOT to be used for urgent needs. For medical emergencies, dial 911. Introducing [facility]!    
 [facility] introduces Sanswire patient portal. Now you can access parts of your medical record, email your doctor's office, and request medication refills online. 1. In your internet browser, go to [email or web address] 2. Click on the First Time User? Click Here link in the Sign In box. You will see the New Member Sign Up page. 3. Enter your Email Data Source Access Code exactly as it appears below. You will not need to use this code after youve completed the sign-up process. If you do not sign up before the expiration date, you must request a new code. · Email Data Source Access Code: [email or web address] Expires: 10/25/2017 10:31 AM 
 
4. Enter the last four digits of your Social Security Number (xxxx) and Date of Birth (mm/dd/yyyy) as indicated and click Submit. You will be taken to the next sign-up page. 5. Create a Email Data Source ID. This will be your Email Data Source login ID and cannot be changed, so think of one that is secure and easy to remember. 6. Create a Email Data Source password. You can change your password at any time. 7. Enter your Password Reset Question and Answer. This can be used at a later time if you forget your password. 8. Enter your e-mail address. You will receive e-mail notification when new information is available in [address] 9. Click Sign Up. You can now view and download portions of your medical record. 10. Click the Download Summary menu link to download a portable copy of your medical information. If you have questions, please visit the Frequently Asked Questions section of the Email Data Source website. Remember, Email Data Source is NOT to be used for urgent needs. For medical emergencies, dial 911. Now available from your iPhone and Android! Please provide this summary of care documentation to your next provider. Your primary care clinician is listed as Linette Man. If you have any questions after today's visit, please call [phone].

## 2017-07-27 NOTE — PATIENT INSTRUCTIONS
[address] [place]. [place], [address]  Tel.  [phone]  Fax. 100 [facility] 60  [place], [address] [facility]  Tel.  [phone]  Fax. [phone] [address] [facility]  [place]  Tel.  [phone]  Fax. [phone]     Learning About CPAP for Sleep Apnea  What is CPAP? CPAP is a small machine that you use at home every night while you sleep. It increases air pressure in your throat to keep your airway open. When you have sleep apnea, this can help you sleep better so you feel much better. CPAP stands for \"continuous positive airway pressure. \"  The CPAP machine will have one of the following:  · A mask that covers your nose and mouth  · Prongs that fit into your nose  · A mask that covers your nose only, the most common type. This type is called NCPAP. The N stands for \"nasal.\"  Why is it done? CPAP is usually the best treatment for obstructive sleep apnea. It is the first treatment choice and the most widely used. Your doctor may suggest CPAP if you have:  · Moderate to severe sleep apnea. · Sleep apnea and coronary artery disease (CAD) or heart failure. How does it help? · CPAP can help you have more normal sleep, so you feel less sleepy and more alert during the daytime. · CPAP may help keep heart failure or other heart problems from getting worse. · NCPAP may help lower your blood pressure. · If you use CPAP, your bed partner may also sleep better because you are not snoring or restless. What are the side effects? Some people who use CPAP have:  · A dry or stuffy nose and a sore throat. · Irritated skin on the face. · Sore eyes. · Bloating. If you have any of these problems, work with your doctor to fix them. Here are some things you can try:  · Be sure the mask or nasal prongs fit well. · See if your doctor can adjust the pressure of your CPAP. · If your nose is dry, try a humidifier.   · If your nose is runny or stuffy, try decongestant medicine or a steroid nasal spray. If these things do not help, you might try a different type of machine. Some machines have air pressure that adjusts on its own. Others have air pressures that are different when you breathe in than when you breathe out. This may reduce discomfort caused by too much pressure in your nose. Where can you learn more? Go to MMIT.be  Enter Herman Browning in the search box to learn more about \"Learning About CPAP for Sleep Apnea. \"   © 2155-4849 [employer]. Care instructions adapted under license by [employer] (which disclaims liability or warranty for this information). This care instruction is for use with your licensed healthcare professional. If you have questions about a medical condition or this instruction, always ask your healthcare professional. [address] any warranty or liability for your use of this information. Content Version: 2.8.60063; Last Revised: January 11, 2010  PROPER SLEEP HYGIENE    What to avoid  · Do not have drinks with caffeine, such as coffee or black tea, for 8 hours before bed. · Do not smoke or use other types of tobacco near bedtime. Nicotine is a stimulant and can keep you awake. · Avoid drinking alcohol late in the evening, because it can cause you to wake in the middle of the night. · Do not eat a big meal close to bedtime. If you are hungry, eat a light snack. · Do not drink a lot of water close to bedtime, because the need to urinate may wake you up during the night. · Do not read or watch TV in bed. Use the bed only for sleeping and sexual activity. What to try  · Go to bed at the same time every night, and wake up at the same time every morning. Do not take naps during the day. · Keep your bedroom quiet, dark, and cool. · Get regular exercise, but not within 3 to 4 hours of your bedtime. .  · Sleep on a comfortable pillow and mattress.   · If watching the clock makes you anxious, turn it facing away from you so you cannot see the time. · If you worry when you lie down, start a worry book. Well before bedtime, write down your worries, and then set the book and your concerns aside. · Try meditation or other relaxation techniques before you go to bed. · If you cannot fall asleep, get up and go to another room until you feel sleepy. Do something relaxing. Repeat your bedtime routine before you go to bed again. · Make your house quiet and calm about an hour before bedtime. Turn down the lights, turn off the TV, log off the computer, and turn down the volume on music. This can help you relax after a busy day. Drowsy Driving: The [employer] cites drowsiness as a causing factor in more than 620,924 police reported crashes annually, resulting in 76,000 injuries and 1,500 deaths. Other surveys suggest 55% of people polled have driven while drowsy in the past year, 23% had fallen asleep but not crashed, 3% crashed, and 2% had and accident due to drowsy driving. Who is at risk? Young Drivers: One study of drowsy driving accidents states that 55% of the drivers were under 25 years. Of those, 75% were male. Shift Workers and Travelers: People who work overnight or travel across time zones frequently are at higher risk of experiencing Circadian Rhythm Disorders. They are trying to work and function when their body is programed to sleep. Sleep Deprived: Lack of sleep has a serious impact on your ability to pay attention or focus on a task. Consistently getting less than the average of 8 hours your body needs creates partial or cumulative sleep deprivation. Untreated Sleep Disorders: Sleep Apnea, Narcolepsy, R.L.S., and other sleep disorders (untreated) prevent a person from getting enough restful sleep. This leads to excessive daytime sleepiness and increases the risk for drowsy driving accidents by up to 7 times.   Medications / Alcohol: Even over the counter medications can cause drowsiness. Medications that impair a drivers attention should have a warning label. Alcohol naturally makes you sleepy and on its own can cause accidents. Combined with excessive drowsiness its effects are amplified. Signs of Drowsy Driving:   * You don't remember driving the last few miles   * You may drift out of your mateo   * You are unable to focus and your thoughts wander   * You may yawn more often than normal   * You have difficulty keeping your eyes open / nodding off   * Missing traffic signs, speeding, or tailgating  Prevention-   Good sleep hygiene, lifestyle and behavioral choices have the most impact on drowsy driving. There is no substitute for sleep and the average person requires 8 hours nightly. If you find yourself driving drowsy, stop and sleep. Consider the sleep hygiene tips provided during your visit as well. Medication Refill Policy: Refills for all medications require 1 week advance notice. Please have your pharmacy fax a refill request. We are unable to fax, or call in \"controled substance\" medications and you will need to pick these prescriptions up from our office. [employer]o Activation    Thank you for requesting access to [employer]. Please follow the instructions below to securely access and download your online medical record. Tinteo allows you to send messages to your doctor, view your test results, renew your prescriptions, schedule appointments, and more. How Do I Sign Up? 1. In your internet browser, go to [email or web address]. 2. Click on the First Time User? Click Here link in the Sign In box. You will see the New Member Sign Up page. 3. Enter your Tinteo Access Code exactly as it appears below. You will not need to use this code after youve completed the sign-up process. If you do not sign up before the expiration date, you must request a new code.     Tinteo Access Code: [email or web address]  Expires: 10/25/2017 10:31 AM (This is the date your Proterro access code will )    4. Enter the last four digits of your Social Security Number (xxxx) and Date of Birth (mm/dd/yyyy) as indicated and click Submit. You will be taken to the next sign-up page. 5. Create a Proterro ID. This will be your Proterro login ID and cannot be changed, so think of one that is secure and easy to remember. 6. Create a Proterro password. You can change your password at any time. 7. Enter your Password Reset Question and Answer. This can be used at a later time if you forget your password. 8. Enter your e-mail address. You will receive e-mail notification when new information is available in [address] 9. Click Sign Up. You can now view and download portions of your medical record. 10. Click the Download Summary menu link to download a portable copy of your medical information. Additional Information    If you have questions, please call [phone]. Remember, Proterro is NOT to be used for urgent needs. For medical emergencies, dial 911.

## 2017-07-27 NOTE — PROGRESS NOTES
217 [facility] [place]. [place], [address]  Tel.  [phone]  Fax. 100 [facility] 60  [place], [address] [facility]  Tel.  [phone]  Fax. [phone] [address]   Tel.  [phone]  Fax. [phone]     S>Wes Thorne is a 49 y.o. female seen for a positive airway pressure follow-up. She reports no problems using the device. The following problems are identified:    Drowsiness no Problems exhaling no   Snoring no Forget to put on no   Mask Comfortable yes Can't fall asleep no   Dry Mouth no Mask falls off no   Air Leaking no Frequent awakenings no         She admits that her sleep has significantly improved. Therapy Apnea Index averaged over PAP use: 2 /hr which reflects improved sleep breathing condition. Allergies   Allergen Reactions    Ambien [Zolpidem] Unknown (comments)     Causes Migraine    Cephalexin Rash    Morphine Rash    Motrin [Ibuprofen] Swelling    Nortriptyline Unknown (comments)     Bleeding from mouth    Pepcid [Famotidine] Other (comments)     Migraines.  Protonix [Pantoprazole] Rash    Vicodin [Hydrocodone-Acetaminophen] Unknown (comments)     Causes Migraines    Vitamins For Infusion Unknown (comments)     Pt stated all vitamins cause her lightheadness and sick       She has a current medication list which includes the following prescription(s): simvastatin, gabapentin, fluticasone, hydrocortisone, paroxetine, trazodone, bupropion xl, divalproex dr, montelukast, namenda xr, aspirin, cetirizine, fluticasone-salmeterol, albuterol, ranitidine, topiramate, donepezil, and melatonin. .      She  has a past medical history of Anal fistula; Asthma; Back ache; CAD (coronary artery disease); Cholesterol blood decreased; Dementia; Depression; Deviated septum; Diabetes ([address] Fibromyalgia; Frequent headaches; Heart valve disease; Herniated nucleus pulposus, L5-S1; Migraine; Protein S deficiency ([address]  Pulmonary emboli ([facility] Pulmonary hypertension ([facility] Restless leg syndrome; RLS (restless legs syndrome); Sick[facility] cell trait [facility] Sleep apnea; Stroke [facility]); Upper airway resistance syndrome; and Vaginal fistula. She also has no past medical history of Chronic obstructive pulmonary disease ([facility] [place] Sleepiness Score: 2   and Modified F.O.S.Q. Score Total / 2: 15   which reflect improved sleep quality over therapy time. O>    Visit Vitals    /82    Pulse (!) 58    Temp 98.2 °F (36.8 °C)    Ht 5' 7\" (1.702 m)    Wt 158 lb (71.7 kg)    SpO2 100%    BMI 24.75 kg/m2           General:   Alert, oriented, not in distress   Neck:   No JVD    Chest/Lungs:  symetrical lung expansion , no accessory muscle use    Extremities:  no obvious rashes , negative edema    Neuro:  No focal deficits ; No obvious tremor    Psych:  Normal affect ,  Normal countenance ;           A>    ICD-10-CM ICD-9-CM    1. DANIEL (obstructive sleep apnea) G47.33 327.23    2. Hyposomnia, insomnia, or sleeplessness associated with anxiety F51.05 293.84     F41.9 327.02      AHI = ?. On CPAP :  8.5 cmH2O. Compliant:      yes    Therapeutic Response:  Positive    P>      * Follow-up Disposition:  Return if symptoms worsen or fail to improve. * She was asked to contact our office for any problems regarding PAP therapy. * Counseling was provided regarding the importance of regular PAP use and on proper sleep hygiene and safe driving. * Re-enforced proper and regular cleaning for the device. * The problem of recurrent insomnia is discussed. Avoidance of caffeine sources is strongly encouraged. Sleep hygiene issues are reviewed. The use of sedative hypnotics for temporary relief may be appropriate for a short 2-3 week course; we discussed the addictive nature of these drugs and counseled her on stress mangement. Thank you for allowing us to participate in your patient's medical care.     Renetta Graham M.D.  (electronically signed)  Diplomate in Sleep MedicineFORTINO

## 2017-09-13 ENCOUNTER — OFFICE VISIT (OUTPATIENT)
Dept: FAMILY MEDICINE CLINIC | Age: 57
End: 2017-09-13

## 2017-09-13 VITALS
BODY MASS INDEX: 25.9 KG/M2 | HEART RATE: 59 BPM | RESPIRATION RATE: 18 BRPM | WEIGHT: 165 LBS | SYSTOLIC BLOOD PRESSURE: 132 MMHG | DIASTOLIC BLOOD PRESSURE: 70 MMHG | TEMPERATURE: 98.3 F | HEIGHT: 67 IN | OXYGEN SATURATION: 98 %

## 2017-09-13 DIAGNOSIS — M25.50 MULTIPLE JOINT PAIN: Primary | ICD-10-CM

## 2017-09-13 DIAGNOSIS — R73.9 BLOOD GLUCOSE ELEVATED: ICD-10-CM

## 2017-09-13 RX ORDER — TOLMETIN SODIUM 400 MG
400 CAPSULE ORAL 3 TIMES DAILY
Qty: 90 CAP | Refills: 2 | Status: SHIPPED | OUTPATIENT
Start: 2017-09-13 | End: 2017-09-18 | Stop reason: RX

## 2017-09-13 RX ORDER — DIVALPROEX SODIUM 250 MG/1
TABLET, EXTENDED RELEASE ORAL
Refills: 0 | COMMUNITY
Start: 2017-08-20 | End: 2017-09-27

## 2017-09-13 RX ORDER — PREDNISONE 10 MG/1
TABLET ORAL
Qty: 20 TAB | Refills: 0 | Status: SHIPPED | OUTPATIENT
Start: 2017-09-13 | End: 2017-09-27

## 2017-09-13 RX ORDER — BUPROPION HYDROCHLORIDE 75 MG/1
TABLET ORAL
Refills: 0 | COMMUNITY
Start: 2017-09-05 | End: 2017-09-27

## 2017-09-13 NOTE — PROGRESS NOTES
Chief Complaint   Patient presents with    Fibromyalgia     she is a 49y.o. year old female who presents for evalution. C/o pain in the hands in am and stiffness worse in the am. Also pain in the hips and knees. She thought it was fibromyalgia pain. She is taking tylenol prn which  Reviewed PmHx, RxHx, FmHx, SocHx, AllgHx and updated and dated in the chart. Patient Active Problem List    Diagnosis    Rectal vaginal fistula    Valvular disease    Depression     She sees a psychiatrist she says      Prediabetes    Seasonal allergic rhinitis    Mild intermittent asthma without complication    Unroofed coronary sinus    Pulmonary embolism ([facility]       Nurse notes were reviewed and copied and are correct  Review of Systems - negative except as listed above in the HPI    Objective:     Vitals:    09/13/17 1516   BP: 132/70   Pulse: (!) 59   Resp: 18   Temp: 98.3 °F (36.8 °C)   TempSrc: Oral   SpO2: 98%   Weight: 165 lb (74.8 kg)   Height: 5' 7\" (1.702 m)        Physical Examination: General appearance - alert, well appearing, and in no distress  Mental status - alert, oriented to person, place, and time  Lymphatics - no palpable lymphadenopathy, no hepatosplenomegaly  Chest - clear to auscultation, no wheezes, rales or rhonchi, symmetric air entry  Heart - normal rate, regular rhythm, normal S1, S2, no murmurs, rubs, clicks or gallops  Musculoskeletal - no joint tenderness, deformity or swelling  Extremities - peripheral pulses normal, no pedal edema, no clubbing or cyanosis  Skin - normal coloration and turgor, no rashes, no suspicious skin lesions noted      Assessment/ Plan:   Diagnoses and all orders for this visit:    1. Multiple joint pain  -     TERENCE BY MULTIPLEX FLOW IA, QL  -     RHEUMATOID FACTOR, QL  -     REFERRAL TO RHEUMATOLOGY  -     predniSONE (DELTASONE) 10 mg tablet;  Take 4 tab ea day for 2 days, then 3 tab ea day for 2 days , then 2 tab ea day for 2 days, then 1 tab ea day for 2 days  - tolmetin (TOLECTIN) 400 mg capsule; Take 1 Cap by mouth three (3) times daily for 90 days. She sounds like she has an inflammtory arthritis. Tylenol not helping and she is miserable  She has not tried any other nsid due to eyes swelling in reaction to motrin  Try prednisone taperand after that take tolmetin prn. Keep benadryl in home in case there is any itchong or swelling. For facial swelling go to ER after taking 50 mg benadryl       Follow-up Disposition:  Return in about 3 months (around 12/13/2017). ICD-10-CM ICD-9-CM    1. Multiple joint pain M25.50 719.49 TERENCE BY MULTIPLEX FLOW IA, QL      RHEUMATOID FACTOR, QL      REFERRAL TO RHEUMATOLOGY      predniSONE (DELTASONE) 10 mg tablet      tolmetin (TOLECTIN) 400 mg capsule   2. Blood glucose elevated Z66.7 519.66 METABOLIC PANEL, COMPREHENSIVE      HEMOGLOBIN A1C WITH EAG       I have discussed the diagnosis with the patient and the intended plan as seen in the above orders. The patient has received an after-visit summary and questions were answered concerning future plans. Medication Side Effects and Warnings were discussed with patient: yes  Patient Labs were reviewed and or requested: yes  Patient Past Records were reviewed and or requested: yes        There are no Patient Instructions on file for this visit.     The patient verbalizes understanding and agrees with the plan of care        Patient has the advanced directives booklet to review

## 2017-09-13 NOTE — PROGRESS NOTES
1. Have you been to the ER, urgent care clinic since your last visit? Hospitalized since your last visit? No    2. Have you seen or consulted any other health care providers outside of the [address] [place] [address] since your last visit? Include any pap smears or colon screening.  No    Chief Complaint   Patient presents with    Fibromyalgia

## 2017-09-13 NOTE — MR AVS SNAPSHOT
Visit Information Date & Time Provider Department Dept. Phone Encounter #  
 9/13/2017  2:30 PM Kristen Troncoso MD [address] [place] [address] [id number] Follow-up Instructions Return in about 3 months (around 12/13/2017). Your Appointments 3/6/2018  8:00 AM  
Medicare Physical with Kristen Troncoso MD  
[address]) Appt Note: MED Wellness/Yearly due; MED Wellness/Yearly due  
 Henrik Bennett [phone]  
[facility] [id number] Upcoming Health Maintenance Date Due Hepatitis C Screening 1960 FOBT Q 1 YEAR AGE 50-75 3/22/2017 MEDICARE YEARLY EXAM 12/14/2017 BREAST CANCER SCRN MAMMOGRAM 6/16/2019 PAP AKA CERVICAL CYTOLOGY 12/13/2019 DTaP/Tdap/Td series (2 - Td) 12/13/2026 Allergies as of 9/13/2017  Review Complete On: 9/13/2017 By: Vero Gaytan LPN Severity Noted Reaction Type Reactions Ambien [Zolpidem]  09/09/2016    Unknown (comments) Causes Migraine Cephalexin  03/17/2016    Rash Morphine  09/01/2015    Rash Motrin [Ibuprofen]  03/11/2015    Swelling Nortriptyline  09/09/2016    Unknown (comments) Bleeding from mouth Pepcid [Famotidine]  07/03/2017    Other (comments) Migraines. Protonix [Pantoprazole]  03/17/2016    Rash Vicodin [Hydrocodone-acetaminophen]  09/09/2016    Unknown (comments) Causes Migraines Vitamins For Infusion  09/09/2016    Unknown (comments) Pt stated all vitamins cause her lightheadness and sick Current Immunizations  Reviewed on 6/15/2017 Name Date Influenza High Dose Vaccine PF 8/15/2016 Influenza Vaccine 9/1/2015 Influenza Vaccine (Quad) PF 8/1/2017 Pneumococcal Vaccine (Unspecified Type) 9/1/2015 Tdap 12/13/2016 Zoster Vaccine, Live 6/13/2017 Not reviewed this visit You Were Diagnosed With   
  
 Codes Comments Multiple joint pain    -  Primary ICD-10-CM: M25.50 ICD-9-CM: 719.49 Blood glucose elevated     ICD-10-CM: R73.9 ICD-9-CM: 790.29 Vitals BP Pulse Temp Resp Height(growth percentile) Weight(growth percentile) 132/70 (!) 59 98.3 °F (36.8 °C) (Oral) 18 5' 7\" (1.702 m) 165 lb (74.8 kg) SpO2 BMI OB Status Smoking Status 98% 25.84 kg/m2 Hysterectomy Never Smoker Vitals History BMI and BSA Data Body Mass Index Body Surface Area  
 25.84 kg/m 2 1.88 m 2 Preferred Pharmacy Pharmacy Name Phone [facility] [address] Renita Magallanes [phone] Your Updated Medication List  
  
   
This list is accurate as of: 9/13/17  3:35 PM.  Always use your most recent med list.  
  
  
  
  
 albuterol 2.5 mg /3 mL (0.083 %) nebulizer solution Commonly known as:  PROVENTIL VENTOLIN  
by Nebulization route once. aspirin 81 mg chewable tablet Take 81 mg by mouth daily. * buPROPion  mg tablet Commonly known as:  Nic Priestly Take 1 Tab by mouth every morning. * buPROPion 75 mg tablet Commonly known as:  WELLBUTRIN  
take 1 tablet by mouth at bedtime take with BUPROPION 150MG once daily Cetirizine 10 mg Cap Take  by mouth. * DEPAKOTE 250 mg tablet Generic drug:  divalproex DR Take 250 mg by mouth three (3) times daily. * divalproex  mg ER tablet Commonly known as:  DEPAKOTE ER  
  
 donepezil 10 mg tablet Commonly known as:  ARICEPT Take 10 mg by mouth nightly. fluticasone 50 mcg/actuation nasal spray Commonly known as:  Penny Courser 2 Sprays by Both Nostrils route daily. fluticasone-salmeterol 500-50 mcg/dose diskus inhaler Commonly known as:  ADVAIR Take 1 Puff by inhalation every twelve (12) hours. gabapentin 400 mg capsule Commonly known as:  NEURONTIN Take 1 Cap by mouth three (3) times daily. hydrocortisone 2.5 % rectal cream  
Commonly known as:  ANUSOL-HC Insert  into rectum four (4) times daily. melatonin 3 mg tablet Take  by mouth.  
  
 montelukast 10 mg tablet Commonly known as:  SINGULAIR Take 1 Tab by mouth daily. NAMENDA XR 28 mg capsule Generic drug:  memantine ER One cap once daily. PARoxetine 20 mg tablet Commonly known as:  PAXIL Take 1 Tab by mouth daily. predniSONE 10 mg tablet Commonly known as:  Dalbert Mina Take 4 tab ea day for 2 days, then 3 tab ea day for 2 days , then 2 tab ea day for 2 days, then 1 tab ea day for 2 days  
  
 raNITIdine 150 mg tablet Commonly known as:  ZANTAC Take 150 mg by mouth two (2) times a day. simvastatin 20 mg tablet Commonly known as:  ZOCOR Take 1 Tab by mouth nightly. tolmetin 400 mg capsule Commonly known as:  TOLECTIN Take 1 Cap by mouth three (3) times daily for 90 days. topiramate 100 mg tablet Commonly known as:  TOPAMAX Take  by mouth two (2) times a day. traZODone 100 mg tablet Commonly known as:  Anastasia Diana Take 1 Tab by mouth nightly as needed for Sleep. * Notice: This list has 4 medication(s) that are the same as other medications prescribed for you. Read the directions carefully, and ask your doctor or other care provider to review them with you. Prescriptions Sent to Pharmacy Refills  
 predniSONE (DELTASONE) 10 mg tablet 0 Sig: Take 4 tab ea day for 2 days, then 3 tab ea day for 2 days , then 2 tab ea day for 2 days, then 1 tab ea day for 2 days Class: Normal  
 Pharmacy: [facility] [place] [address] Ph #: [phone]  
 tolmetin (TOLECTIN) 400 mg capsule 2 Sig: Take 1 Cap by mouth three (3) times daily for 90 days. Class: Normal  
 Pharmacy: [facility] Ph #: [phone] Route: Oral  
  
We Performed the Following TERENCE BY MULTIPLEX FLOW IA, QL [62547 CPT(R)] HEMOGLOBIN A1C WITH EAG [35926 CPT(R)] METABOLIC PANEL, COMPREHENSIVE [40387 CPT(R)] REFERRAL TO RHEUMATOLOGY [ZZT65 Custom] Comments:  
 eval and treat for inflammatory arthritis, has pain and stiffness worse in am,. Hands included in the painful locations, also hips and knees. RHEUMATOID FACTOR, QL [id number] CPT(R)] Follow-up Instructions Return in about 3 months (around 12/13/2017). Referral Information Referral ID Referred By Referred To  
  
 [id number] Alan henderson, [address] Kevyn Gavin MD   
   [address] [place], [address] Phone: [phone] Fax: [phone] Visits Status Start Date End Date 1 New Request 9/13/17 9/13/18 If your referral has a status of pending review or denied, additional information will be sent to support the outcome of this decision. Introducing [facility]! 3 [facility] introduces Room Choice patient portal. Now you can access parts of your medical record, email your doctor's office, and request medication refills online. 1. In your internet browser, go to [email or web address] 2. Click on the First Time User? Click Here link in the Sign In box. You will see the New Member Sign Up page. 3. Enter your Room Choice Access Code exactly as it appears below. You will not need to use this code after youve completed the sign-up process. If you do not sign up before the expiration date, you must request a new code. · Room [employer] Access Code: [email or web address] Expires: 10/25/2017 10:31 AM 
 
4. Enter the last four digits of your Social Security Number (xxxx) and Date of Birth (mm/dd/yyyy) as indicated and click Submit. You will be taken to the next sign-up page. 5. Create a [employer] ID. This will be your [employer] login ID and cannot be changed, so think of one that is secure and easy to remember. 6. Create a [employer] password. You can change your password at any time. 7. Enter your Password Reset Question and Answer. This can be used at a later time if you forget your password. 8. Enter your e-mail address. You will receive e-mail notification when new information is available in [address] 9. Click Sign Up. You can now view and download portions of your medical record. 10. Click the Download Summary menu link to download a portable copy of your medical information. If you have questions, please visit the Frequently Asked Questions section of the [employer] website. Remember, [employer] is NOT to be used for urgent needs. For medical emergencies, dial 911. Now available from your iPhone and Android! Please provide this summary of care documentation to your next provider. Your primary care clinician is listed as Amelie Robles. If you have any questions after today's visit, please call [phone].

## 2017-09-14 ENCOUNTER — TELEPHONE (OUTPATIENT)
Dept: FAMILY MEDICINE CLINIC | Age: 57
End: 2017-09-14

## 2017-09-14 ENCOUNTER — DOCUMENTATION ONLY (OUTPATIENT)
Dept: FAMILY MEDICINE CLINIC | Age: 57
End: 2017-09-14

## 2017-09-14 LAB
ALBUMIN SERPL-MCNC: 3.9 G/DL (ref 3.5–5.5)
ALBUMIN/GLOB SERPL: 1.5 {RATIO} (ref 1.2–2.2)
ALP SERPL-CCNC: 86 IU/L (ref 39–117)
ALT SERPL-CCNC: 10 IU/L (ref 0–32)
ANA SER QL: NEGATIVE
AST SERPL-CCNC: 15 IU/L (ref 0–40)
BILIRUB SERPL-MCNC: <0.2 MG/DL (ref 0–1.2)
BUN SERPL-MCNC: 15 MG/DL (ref 6–24)
BUN/CREAT SERPL: 17 (ref 9–23)
CALCIUM SERPL-MCNC: 8.8 MG/DL (ref 8.7–10.2)
CHLORIDE SERPL-SCNC: 103 MMOL/L (ref 96–106)
CO2 SERPL-SCNC: 25 MMOL/L (ref 18–29)
CREAT SERPL-MCNC: 0.9 MG/DL (ref 0.57–1)
EST. AVERAGE GLUCOSE BLD GHB EST-MCNC: 108 MG/DL
GLOBULIN SER CALC-MCNC: 2.6 G/DL (ref 1.5–4.5)
GLUCOSE SERPL-MCNC: 89 MG/DL (ref 65–99)
HBA1C MFR BLD: 5.4 % (ref 4.8–5.6)
POTASSIUM SERPL-SCNC: 4.1 MMOL/L (ref 3.5–5.2)
PROT SERPL-MCNC: 6.5 G/DL (ref 6–8.5)
RHEUMATOID FACT SERPL-ACNC: <10 IU/ML (ref 0–13.9)
SODIUM SERPL-SCNC: 142 MMOL/L (ref 134–144)

## 2017-09-14 NOTE — PROGRESS NOTES
PA approved for Tolmetin 400mg capsules qty 90 for 30 days beginning 9/14/17-9/14/2018. Kassy Fowler [id number].

## 2017-09-18 NOTE — PROGRESS NOTES
Spoke with pt and advised of recommendation due to Tolmetin on  back order. Pt verbalized understanding and no further questions.

## 2017-09-18 NOTE — PROGRESS NOTES
Not able to get tolmetin.  Take tylenol 650 three times a day as needed until rheumatology weighs in

## 2017-09-21 DIAGNOSIS — J30.2 SEASONAL ALLERGIC RHINITIS: ICD-10-CM

## 2017-09-21 DIAGNOSIS — J45.20 MILD INTERMITTENT ASTHMA WITHOUT COMPLICATION: ICD-10-CM

## 2017-09-21 RX ORDER — MONTELUKAST SODIUM 10 MG/1
TABLET ORAL
Qty: 30 TAB | Refills: 11 | Status: SHIPPED | OUTPATIENT
Start: 2017-09-21 | End: 2018-10-09 | Stop reason: SDUPTHER

## 2017-09-22 ENCOUNTER — TELEPHONE (OUTPATIENT)
Dept: FAMILY MEDICINE CLINIC | Age: 57
End: 2017-09-22

## 2017-09-22 NOTE — PROGRESS NOTES
Spoke with pt and advised of lab results. Pt verbalized understanding, but is concerned if the pain is associated with her fibromyalgia. Verbalized to pt that I will send provider a message and contact her back with MD recommendations.

## 2017-09-22 NOTE — PROGRESS NOTES
The tests for lupus and rheumatoid arthritis are negative  The liver and kidney tests are normal  The blood sugar test is normal

## 2017-09-27 ENCOUNTER — OFFICE VISIT (OUTPATIENT)
Dept: NEUROLOGY | Age: 57
End: 2017-09-27

## 2017-09-27 VITALS
DIASTOLIC BLOOD PRESSURE: 72 MMHG | WEIGHT: 163 LBS | HEART RATE: 57 BPM | BODY MASS INDEX: 25.53 KG/M2 | SYSTOLIC BLOOD PRESSURE: 120 MMHG

## 2017-09-27 RX ORDER — ELETRIPTAN HYDROBROMIDE 20 MG/1
20 TABLET, FILM COATED ORAL
Qty: 9 TAB | Refills: 2 | Status: SHIPPED | OUTPATIENT
Start: 2017-09-27 | End: 2017-12-05

## 2017-09-27 RX ORDER — TOPIRAMATE 100 MG/1
100 TABLET, FILM COATED ORAL 2 TIMES DAILY
Qty: 60 TAB | Refills: 2 | Status: SHIPPED | OUTPATIENT
Start: 2017-09-27 | End: 2017-12-05 | Stop reason: SDUPTHER

## 2017-09-27 NOTE — PROGRESS NOTES
Corie Hudson PATIENT EVALUATION/CONSULTATION       PATIENT NAME: Mj Patterson    MRN: [id number]    REASON FOR CONSULTATION: Headaches    09/27/17      Previous records (physician notes, laboratory reports, and radiology reports) and imaging studies were reviewed and summarized. My recommendations will be communicated back to the patient's physician(s) via electronic medical record and/or by [address] [facility] mail. HISTORY OF PRESENT ILLNESS:  Mj Patterson is a 49 y.o. right handed female presenting for evaluation of headaches present since age 17. Location: L temple, L hemisphere, L bev-orbital  Character: pulsating, pressing, stabbing  Intensity: On average 10/10  Frequency: 15  # HA free days per month: 16  Duration: All day  Aura: None  Associated Sx with HA: no nausea/vomiting, +phonophotophobia. Denies unilateral ptosis, conjunctival injection, lacrimation, sweating  Neurological ROS: Denies focal weakness, numbness or vision loss associated with headaches  Systemic ROS:   Caffeine use: 1 cup daily  H/O Head trauma: denies  Depressive or anxiety Sx: yes    Any change in pattern of HA? No    Triggers: anxiety, bright lights, weather change, fatigue, foods, noise, odors, irregular meals.  No worsening reported with cough/sneeze, bending over  Alleviating factors: darkness, quiet, rest  FHx HA/migraine: mother, father, sister, aunts, son    Treatment so far: TPM previously tolerated this well and felt this was effective at 100mg BID (ran out earlier this year-worsening headache frequency since 5/2017)  Past: Imitrex (side effects, \"heart complications\"), Maxalt (ineffective), Nortriptyline (mouth bleeding)    Investigations so far: MRI Brain [facility]-normal per pt      PAST MEDICAL HISTORY:  Past Medical History:   Diagnosis Date    Anal fistula     Asthma     Back ache     CAD (coronary artery disease)     leaky valves    Cholesterol blood decreased     Dementia     Depression     Deviated septum     Diabetes (HCC)     pre diabetese    Fibromyalgia     Frequent headaches     Heart valve disease     Herniated nucleus pulposus, L5-S1     Migraine     Protein S deficiency (HCC)     Pulmonary emboli (HCC)     Pulmonary hypertension (HCC)     Restless leg syndrome     RLS (restless legs syndrome)     Sickle cell trait (HCC)     Sleep apnea     Stroke (HCC)     Upper airway resistance syndrome     Vaginal fistula        PAST SURGICAL HISTORY:  Past Surgical History:   Procedure Laterality Date    CARDIAC SURG PROCEDURE UNLIST  9/23/99    closure of unroofed coronary sinus    HX ROTATOR CUFF REPAIR      3/2017       FAMILY HISTORY:   Family History   Problem Relation Age of Onset    Diabetes Mother     Heart Disease Mother     Hypertension Mother     Sickle Cell Anemia Sister     Hypertension Brother     Stroke Maternal Aunt          SOCIAL HISTORY:  Social History     Social History    Marital status: SINGLE     Spouse name: N/A    Number of children: N/A    Years of education: N/A     Social History Main Topics    Smoking status: Never Smoker    Smokeless tobacco: Never Used    Alcohol use No    Drug use: No    Sexual activity: Not Currently     Other Topics Concern    None     Social History Narrative         MEDICATIONS:   Current Outpatient Prescriptions   Medication Sig Dispense Refill    montelukast (SINGULAIR) 10 mg tablet take 1 tablet by mouth once daily 30 Tab 11    simvastatin (ZOCOR) 20 mg tablet Take 1 Tab by mouth nightly. 90 Tab 1    gabapentin (NEURONTIN) 400 mg capsule Take 1 Cap by mouth three (3) times daily. 90 Cap 1    PARoxetine (PAXIL) 20 mg tablet Take 1 Tab by mouth daily. 30 Tab 3    traZODone (DESYREL) 100 mg tablet Take 1 Tab by mouth nightly as needed for Sleep. (Patient taking differently: Take 200 mg by mouth nightly as needed for Sleep.) 25 Tab 0    buPROPion XL (WELLBUTRIN XL) 150 mg tablet Take 1 Tab by mouth every morning.  25 Tab 0    divalproex DR (DEPAKOTE) 250 mg tablet Take 250 mg by mouth two (2) times a day.  aspirin 81 mg chewable tablet Take 81 mg by mouth daily.  Cetirizine 10 mg cap Take  by mouth.  fluticasone-salmeterol (ADVAIR) 500-50 mcg/dose diskus inhaler Take 1 Puff by inhalation every twelve (12) hours.  albuterol (PROVENTIL VENTOLIN) 2.5 mg /3 mL (0.083 %) nebulizer solution by Nebulization route once.  donepezil (ARICEPT) 10 mg tablet Take 10 mg by mouth nightly. ALLERGIES:  Allergies   Allergen Reactions    Ambien [Zolpidem] Unknown (comments)     Causes Migraine    Cephalexin Rash    Morphine Rash    Motrin [Ibuprofen] Swelling    Nortriptyline Unknown (comments)     Bleeding from mouth    Pepcid [Famotidine] Other (comments)     Migraines.  Protonix [Pantoprazole] Rash    Vicodin [Hydrocodone-Acetaminophen] Unknown (comments)     Causes Migraines    Vitamins For Infusion Unknown (comments)     Pt stated all vitamins cause her lightheadness and sick         REVIEW OF SYSTEMS:  10 point ROS reviewed with patient. Please see scanned document under media. PHYSICAL EXAM:  Vital Signs:   Visit Vitals    /72    Pulse (!) 57    Wt 73.9 kg (163 lb)    BMI 25.53 kg/m2        General Medical Exam:  General:  Well appearing, comfortable, in no apparent distress. Eyes/ENT: see cranial nerve examination. Neck: No masses appreciated. Full range of motion without tenderness. Respiratory:  Clear to auscultation, good air entry bilaterally. Cardiac:  Regular rate and rhythm, no murmur. GI:  Soft, non-tender, non-distended abdomen. Bowel sounds normal. No masses, organomegaly. Extremities:  No deformities, edema, or skin discoloration. Skin:  No rashes or lesions. Neurological:  · Mental Status:  Alert and oriented to person, place, and time with fluent speech.    · Cranial Nerves:   CNII/III/IV/VI: Optic disc w/clear margins b/l, visual fields full to confrontation, EOMI, PERRL, no ptosis or nystagmus. CN V: Facial sensation intact bilaterally, masseter 5/5   CN VII: Facial muscles symmetric and strong   CN VIII: Hears finger rub well bilaterally, intact vestibular function   CN IX/X: Normal palatal movement   CN XI: Full strength shoulder shrug bilaterally   CN XII: Tongue protrusion full and midline without fasciculation or atrophy  · Motor: Normal tone and muscle bulk with no pronator drift. No atrophy or fasciculations present on examination. Individual muscle group testing:  Shoulder abduction:   Left:5/5   Right : 5/5    Shoulder adduction:   Left:5/5   Right : 5/5    Elbow flexion:      Left:5/5   Right : 5/5  Elbow extension:    Left:5/5   Right : 5/5   Wrist flexion:    Left:5/5   Right : 5/5  Wrist extension:    Left:5/5   Right : 5/5  Arm pronation:   Left:5/5   Right : 5/5  Arm supination:   Left:5/5   Right : 5/5    Finger flexion:    Left:5/5   Right : 5/5    Finger extension:   Left:5/5   Right : 5/5   Finger abduction:  Left:5/5   Right : 5/5   Finger adduction:   Left:5/5   Right : 5/5  Hip flexion:     Left:5/5   Right : 5/5         Hip extension:   Left:5/5   Right : 5/5    Knee flexion:    Left:5/5   Right : 5/5    Knee extension:   Left:5/5   Right : 5/5    Dorsiflexion:     Left:5/5   Right : 5/5  Plantar flexion:    Left:5/5   Right : 5/5      · MSRs: No crossed adductors or clonus. RIGHT  LEFT   Brachioradialis 2+ 2+   Biceps 2+ 2+   Triceps 2+ 2+   Knee 2+ 2+   Achilles 2+ 2+        Plantar response Downward Downward          · Sensation: Normal and symmetric perception of pinprick, temperature, light touch, proprioception, and vibration; (-) Romberg. · Coordination: No dysmetria. Normal rapid alternating movements; finger-to-nose and heel-to- shin testing are within normal limits. · Gait: Normal native and stress (tandem/heel/toe walking). ASSESSMENT:      ICD-10-CM ICD-9-CM    1.  Chronic migraine G43.709 346.70    56 year old AAF presenting with chronic migraines since age 17, L hemispheric, previously on TPM but out of medication due to recent insurance change. Neurological examination is non-focal.  Will resume prior dosing of TPM as she felt this worked well for her in the past without significant side effects. Headache education  Discussed treatment options, both abortive and preventive medications. Instructed patient about medications and potential side effects. Discussed medication overuse headache and to limit use of analgesics to less than 2 doses per week. PLAN:  · Resume TPM 100mg qhs x 1 week, then increase to 100mg BID  · Trial of Relpax PRN for abortive headache therapy to be used no more than twice weekly. Follow-up Disposition:  Return in about 2 months (around 12/1/2017). Adali Abraham DO  Staff Neurologist  Wolf Suazo, American Board of Psychiatry & Neurology       CC:    Alejandra Hodgson MD

## 2017-09-27 NOTE — MR AVS SNAPSHOT
Visit Information Date & Time Provider Department Dept. Phone Encounter #  
 9/27/2017 11:00 AM [facility] [address] [id number] Follow-up Instructions Return in about 2 months (around 12/1/2017). Your Appointments 10/5/2017  4:30 PM  
Any with Cristina Ingram MD  
[address] [facility]) Appt Note: discuss treatment [place] [id number]  
[place] [phone]  
  
    
 12/11/2017  1:00 PM  
Any with Cristina Ingram MD  
[address] [facility]) Appt Note: 3 mo f/u  
 [address]  
[phone]  
  
    
 12/12/2017  1:00 PM  
New Patient with Wing Howell MD  
[address] ([facility]) Appt Note: NP, multiple joint pain, sr  
 [address] [address]  
[phone]  
  
   
 [address] [place] [address]  
  
    
 3/6/2018  8:00 AM  
Medicare Physical with Cristina Ingram MD  
[address] [facility]) Appt Note: MED Wellness/Yearly due; MED Wellness/Yearly due  
 [phone] [id number]  
[place] [phone] Upcoming Health Maintenance Date Due Hepatitis C Screening 1960 FOBT Q 1 YEAR AGE 50-75 3/22/2017 MEDICARE YEARLY EXAM 12/14/2017 BREAST CANCER SCRN MAMMOGRAM 6/16/2019 PAP AKA CERVICAL CYTOLOGY 12/13/2019 DTaP/Tdap/Td series (2 - Td) 12/13/2026 Allergies as of 9/27/2017  Review Complete On: 9/27/2017 By: Black Payton DO Severity Noted Reaction Type Reactions Ambien [Zolpidem]  09/09/2016    Unknown (comments) Causes Migraine Cephalexin  03/17/2016    Rash Morphine  09/01/2015    Rash Motrin [Ibuprofen]  03/11/2015    Swelling Nortriptyline  09/09/2016    Unknown (comments) Bleeding from mouth Pepcid [Famotidine]  07/03/2017    Other (comments) Migraines. Protonix [Pantoprazole]  03/17/2016    Rash Vicodin [Hydrocodone-acetaminophen]  09/09/2016    Unknown (comments) Causes Migraines Vitamins For Infusion  09/09/2016    Unknown (comments) Pt stated all vitamins cause her lightheadness and sick Current Immunizations  Reviewed on 6/15/2017 Name Date Influenza High Dose Vaccine PF 8/15/2016 Influenza Vaccine 9/1/2015 Influenza Vaccine (Quad) PF 8/1/2017 Pneumococcal Vaccine (Unspecified Type) 9/1/2015 Tdap 12/13/2016 Zoster Vaccine, Live 6/13/2017 Not reviewed this visit You Were Diagnosed With   
  
 Codes Comments Chronic migraine    -  Primary ICD-10-CM: G24.530 ICD-9-CM: 346.70 Vitals BP Pulse Weight(growth percentile) BMI OB Status Smoking Status 120/72 (!) 57 163 lb (73.9 kg) 25.53 kg/m2 Hysterectomy Never Smoker BMI and BSA Data Body Mass Index Body Surface Area 25.53 kg/m 2 1.87 m 2 Preferred Pharmacy Pharmacy Name Phone [address] [facility] [phone] Your Updated Medication List  
  
   
This list is accurate as of: 9/27/17 11:21 AM.  Always use your most recent med list.  
  
  
  
  
 albuterol 2.5 mg /3 mL (0.083 %) nebulizer solution Commonly known as:  PROVENTIL VENTOLIN  
by Nebulization route once. aspirin 81 mg chewable tablet Take 81 mg by mouth daily. buPROPion  mg tablet Commonly known as:  Wander Brown Take 1 Tab by mouth every morning. Cetirizine 10 mg Cap Take  by mouth. DEPAKOTE 250 mg tablet Generic drug:  divalproex DR Take 250 mg by mouth two (2) times a day. donepezil 10 mg tablet Commonly known as:  ARICEPT  
 Take 10 mg by mouth nightly. eletriptan 20 mg tablet Commonly known as:  RELPAX Take 1 Tab by mouth once as needed for Migraine for up to 1 dose. may repeat in 2 hours if necessary  
  
 fluticasone-salmeterol 500-50 mcg/dose diskus inhaler Commonly known as:  ADVAIR Take 1 Puff by inhalation every twelve (12) hours. gabapentin 400 mg capsule Commonly known as:  NEURONTIN Take 1 Cap by mouth three (3) times daily. montelukast 10 mg tablet Commonly known as:  SINGULAIR  
take 1 tablet by mouth once daily PARoxetine 20 mg tablet Commonly known as:  PAXIL Take 1 Tab by mouth daily. simvastatin 20 mg tablet Commonly known as:  ZOCOR Take 1 Tab by mouth nightly. topiramate 100 mg tablet Commonly known as:  TOPAMAX Take 1 Tab by mouth two (2) times a day. traZODone 100 mg tablet Commonly known as:  Hayden Erlin Take 1 Tab by mouth nightly as needed for Sleep. Prescriptions Sent to Pharmacy Refills  
 topiramate (TOPAMAX) 100 mg tablet 2 Sig: Take 1 Tab by mouth two (2) times a day. Class: Normal  
 Pharmacy: [address] Ph #: [phone] Route: Oral  
 eletriptan (RELPAX) 20 mg tablet 2 Sig: Take 1 Tab by mouth once as needed for Migraine for up to 1 dose. may repeat in 2 hours if necessary Class: Normal  
 Pharmacy: [address] Ph #: [phone] Route: Oral  
  
Follow-up Instructions Return in about 2 months (around 12/1/2017). Patient Instructions A Healthy Lifestyle: Care Instructions Your Care Instructions A healthy lifestyle can help you feel good, stay at a healthy weight, and have plenty of energy for both work and play. A healthy lifestyle is something you can share with your whole family.  
A healthy lifestyle also can lower your risk for serious health problems, such as high blood pressure, heart disease, and diabetes. You can follow a few steps listed below to improve your health and the health of your family. Follow-up care is a key part of your treatment and safety. Be sure to make and go to all appointments, and call your doctor if you are having problems. Its also a good idea to know your test results and keep a list of the medicines you take. How can you care for yourself at home? · Do not eat too much sugar, fat, or fast foods. You can still have dessert and treats now and then. The goal is moderation. · Start small to improve your eating habits. Pay attention to portion sizes, drink less juice and soda pop, and eat more fruits and vegetables. ¨ Eat a healthy amount of food. A 3-ounce serving of meat, for example, is about the size of a deck of cards. Fill the rest of your plate with vegetables and whole grains. ¨ Limit the amount of soda and sports drinks you have every day. Drink more water when you are thirsty. ¨ Eat at least 5 servings of fruits and vegetables every day. It may seem like a lot, but it is not hard to reach this goal. A serving or helping is 1 piece of fruit, 1 cup of vegetables, or 2 cups of leafy, raw vegetables. Have an apple or some carrot sticks as an afternoon snack instead of a candy bar. Try to have fruits and/or vegetables at every meal. 
· Make exercise part of your daily routine. You may want to start with simple activities, such as walking, bicycling, or slow swimming. Try to be active 30 to 60 minutes every day. You do not need to do all 30 to 60 minutes all at once. For example, you can exercise 3 times a day for 10 or 20 minutes. Moderate exercise is safe for most people, but it is always a good idea to talk to your doctor before starting an exercise program. 
· Keep moving. Baldev Abbasi the lawn, work in the garden, or [facility] [employer]. Take the stairs instead of the elevator at work. · If you smoke, quit. People who smoke have an increased risk for heart attack, stroke, cancer, and other lung illnesses. Quitting is hard, but there are ways to boost your chance of quitting tobacco for good. ¨ Use nicotine gum, patches, or lozenges. ¨ Ask your doctor about stop-smoking programs and medicines. ¨ Keep trying. In addition to reducing your risk of diseases in the future, you will notice some benefits soon after you stop using tobacco. If you have shortness of breath or asthma symptoms, they will likely get better within a few weeks after you quit. · Limit how much alcohol you drink. Moderate amounts of alcohol (up to 2 drinks a day for men, 1 drink a day for women) are okay. But drinking too much can lead to liver problems, high blood pressure, and other health problems. Family health If you have a family, there are many things you can do together to improve your health. · Eat meals together as a family as often as possible. · Eat healthy foods. This includes fruits, vegetables, lean meats and dairy, and whole grains. · Include your family in your fitness plan. Most people think of activities such as jogging or tennis as the way to fitness, but there are many ways you and your family can be more active. Anything that makes you breathe hard and gets your heart pumping is exercise. Here are some tips: 
¨ Walk to do errands or to take your child to school or the bus. ¨ Go for a family bike ride after dinner instead of watching TV. Where can you learn more? Go to http://shell-td.info/. Enter L392 in the search box to learn more about \"A Healthy Lifestyle: Care Instructions. \" Current as of: July 26, 2016 Content Version: 11.3 © 1434-8079 [employer]. Care instructions adapted under license by [employer] (which disclaims liability or warranty for this information).  If you have questions about a medical condition or this instruction, always ask your healthcare professional. [place] [address] any warranty or liability for your use of this information. Introducing [facility] [place] [facility]! [employer] introduces [employer] patient portal. Now you can access parts of your medical record, email your doctor's office, and request medication refills online. 1. In your internet browser, go to [email or web address] 2. Click on the First Time User? Click Here link in the Sign In box. You will see the New Member Sign Up page. 3. Enter your [employer] Access Code exactly as it appears below. You will not need to use this code after youve completed the sign-up process. If you do not sign up before the expiration date, you must request a new code. · [employer] Access Code: [email or web address] Expires: 10/25/2017 10:31 AM 
 
4. Enter the last four digits of your Social Security Number (xxxx) and Date of Birth (mm/dd/yyyy) as indicated and click Submit. You will be taken to the next sign-up page. 5. Create a [employer] ID. This will be your [employer] login ID and cannot be changed, so think of one that is secure and easy to remember. 6. Create a [employer] password. You can change your password at any time. 7. Enter your Password Reset Question and Answer. This can be used at a later time if you forget your password. 8. Enter your e-mail address. You will receive e-mail notification when new information is available in [address] 9. Click Sign Up. You can now view and download portions of your medical record. 10. Click the Download Summary menu link to download a portable copy of your medical information. If you have questions, please visit the Frequently Asked Questions section of the [employer] website. Remember, [employer] is NOT to be used for urgent needs. For medical emergencies, dial 911. Now available from your iPhone and Android! Please provide this summary of care documentation to your next provider. Your primary care clinician is listed as Edson Nation. If you have any questions after today's visit, please call [phone].

## 2017-09-27 NOTE — PATIENT INSTRUCTIONS

## 2017-10-05 ENCOUNTER — OFFICE VISIT (OUTPATIENT)
Dept: FAMILY MEDICINE CLINIC | Age: 57
End: 2017-10-05

## 2017-10-05 VITALS
OXYGEN SATURATION: 95 % | DIASTOLIC BLOOD PRESSURE: 76 MMHG | HEART RATE: 58 BPM | TEMPERATURE: 98.1 F | RESPIRATION RATE: 17 BRPM | BODY MASS INDEX: 25.11 KG/M2 | HEIGHT: 67 IN | WEIGHT: 160 LBS | SYSTOLIC BLOOD PRESSURE: 113 MMHG

## 2017-10-05 DIAGNOSIS — M25.50 MULTIPLE JOINT PAIN: Primary | ICD-10-CM

## 2017-10-05 RX ORDER — DIVALPROEX SODIUM 250 MG/1
TABLET, EXTENDED RELEASE ORAL
Refills: 0 | COMMUNITY
Start: 2017-09-20 | End: 2017-12-05 | Stop reason: DRUGHIGH

## 2017-10-05 RX ORDER — MEMANTINE HYDROCHLORIDE 28 MG/1
28 CAPSULE, EXTENDED RELEASE ORAL DAILY
Refills: 0 | COMMUNITY
Start: 2017-07-24 | End: 2017-12-05

## 2017-10-05 RX ORDER — BUPROPION HYDROCHLORIDE 75 MG/1
TABLET ORAL
Refills: 0 | COMMUNITY
Start: 2017-10-03 | End: 2018-03-08

## 2017-10-05 NOTE — PROGRESS NOTES
Chief Complaint   Patient presents with    Medication Evaluation     for fibromyalgia     she is a 49y.o. year old female who presents for evalution. She had c/o pain in jhands and other joints. The RA and amber was negative. prednisone made the problem much better. She has an appt in rheum in Dec.  No new complaints  She wakes up and the hands are stiff but get better through the day  She takes a 150 mg and a 75 mg wellbutrin tab    Reviewed PmHx, RxHx, FmHx, SocHx, AllgHx and updated and dated in the chart. Patient Active Problem List    Diagnosis    Rectal vaginal fistula    Valvular disease    Depression     She sees a psychiatrist she says      Prediabetes    Seasonal allergic rhinitis    Mild intermittent asthma without complication    Unroofed coronary sinus    Pulmonary embolism ([facility] [address]       Nurse notes were reviewed and copied and are correct  Review of Systems - negative except as listed above in the HPI    Objective:     Vitals:    10/05/17 1615   BP: 113/76   Pulse: (!) 58   Resp: 17   Temp: 98.1 °F (36.7 °C)   TempSrc: Oral   SpO2: 95%   Weight: 160 lb (72.6 kg)   Height: 5' 7\" (1.702 m)        Physical Examination: General appearance - alert, well appearing, and in no distress  Mental status - alert, oriented to person, place, and time  Extremities - peripheral pulses normal, no pedal edema, no clubbing or cyanosis  Skin - normal coloration and turgor, no rashes, no suspicious skin lesions noted      Assessment/ Plan:   Diagnoses and all orders for this visit:    1. Multiple joint pain    waiting for rheumatology's opinion   Follow-up Disposition:  Return if symptoms worsen or fail to improve. ICD-10-CM ICD-9-CM    1. Multiple joint pain M25.50 719.49        I have discussed the diagnosis with the patient and the intended plan as seen in the above orders. The patient has received an after-visit summary and questions were answered concerning future plans.      Medication Side Effects and Warnings were discussed with patient: yes  Patient Labs were reviewed and or requested: yes  Patient Past Records were reviewed and or requested: yes        There are no Patient Instructions on file for this visit.     The patient verbalizes understanding and agrees with the plan of care        Patient has the advanced directives booklet to review

## 2017-10-05 NOTE — PROGRESS NOTES
1. Have you been to the ER, urgent care clinic since your last visit? Hospitalized since your last visit? No    2. Have you seen or consulted any other health care providers outside of the [address] [place] [address] since your last visit? Include any pap smears or colon screening.  No     Chief Complaint   Patient presents with    Medication Evaluation     for fibromyalgia

## 2017-10-05 NOTE — MR AVS SNAPSHOT
Visit Information Date & Time Provider Department Dept. Phone Encounter #  
 10/5/2017  4:30 PM Ramirez Gill MD [facility] [id number] Your Appointments 12/5/2017  1:30 PM  
Follow Up with DO Leslee [facility] at [address] [place] [address]) Appt Note: 2 month f/u headache 9/27/17  
 [address] [place] [address] [facility] [address] [facility] [address]  
[phone]  
  
   
 [address] [place] [address] [address]  
  
    
 12/11/2017  1:00 PM  
Any with Ramirez Gill MD  
[address] [place] [address]) Appt Note: 3 mo f/u  
 [address] [id number]  
[place] [id number]  
  
    
 3/6/2018  8:00 AM  
Medicare Physical with Ramirez Gill MD  
[address] [place] [address]) Appt Note: MED Wellness/Yearly due; MED Wellness/Yearly due  
 [address] [id number]  
[place] [address] Upcoming Health Maintenance Date Due Hepatitis C Screening 1960 FOBT Q 1 YEAR AGE 50-75 3/22/2017 MEDICARE YEARLY EXAM 12/14/2017 BREAST CANCER SCRN MAMMOGRAM 6/16/2019 PAP AKA CERVICAL CYTOLOGY 12/13/2019 DTaP/Tdap/Td series (2 - Td) 12/13/2026 Allergies as of 10/5/2017  Review Complete On: 10/5/2017 By: Ramirez Gill MD  
  
 Severity Noted Reaction Type Reactions Ambien [Zolpidem]  09/09/2016    Unknown (comments) Causes Migraine Cephalexin  03/17/2016    Rash Morphine  09/01/2015    Rash Motrin [Ibuprofen]  03/11/2015    Swelling Nortriptyline  09/09/2016    Unknown (comments) Bleeding from mouth Pepcid [Famotidine]  07/03/2017    Other (comments) Migraines. Protonix [Pantoprazole]  03/17/2016    Rash Vicodin [Hydrocodone-acetaminophen]  09/09/2016    Unknown (comments) Causes Migraines Vitamins For Infusion  09/09/2016    Unknown (comments) Pt stated all vitamins cause her lightheadness and sick Current Immunizations  Reviewed on 6/15/2017 Name Date Influenza High Dose Vaccine PF 8/15/2016 Influenza Vaccine 9/1/2015 Influenza Vaccine (Quad) PF 8/1/2017 Pneumococcal Vaccine (Unspecified Type) 9/1/2015 Tdap 12/13/2016 Zoster Vaccine, Live 6/13/2017 Not reviewed this visit You Were Diagnosed With   
  
 Codes Comments Multiple joint pain    -  Primary ICD-10-CM: M25.50 ICD-9-CM: 719.49 Vitals BP Pulse Temp Resp Height(growth percentile) Weight(growth percentile) 113/76 (!) 58 98.1 °F (36.7 °C) (Oral) 17 5' 7\" (1.702 m) 160 lb (72.6 kg) SpO2 BMI OB Status Smoking Status 95% 25.06 kg/m2 Hysterectomy Never Smoker Vitals History BMI and BSA Data Body Mass Index Body Surface Area 25.06 kg/m 2 1.85 m 2 Preferred Pharmacy Pharmacy Name Phone [address] [facility] [phone] Your Updated Medication List  
  
   
This list is accurate as of: 10/5/17  4:33 PM.  Always use your most recent med list.  
  
  
  
  
 albuterol 2.5 mg /3 mL (0.083 %) nebulizer solution Commonly known as:  PROVENTIL VENTOLIN  
by Nebulization route once. aspirin 81 mg chewable tablet Take 81 mg by mouth daily. * buPROPion  mg tablet Commonly known as:  Mariah Sulema Take 1 Tab by mouth every morning. * buPROPion 75 mg tablet Commonly known as:  WELLBUTRIN  
take 1 tablet by mouth at bedtime Cetirizine 10 mg Cap Take  by mouth. divalproex  mg ER tablet Commonly known as:  DEPAKOTE ER  
take 1 tablet by mouth twice a day  
  
 donepezil 10 mg tablet Commonly known as:  ARICEPT Take 10 mg by mouth nightly. eletriptan 20 mg tablet Commonly known as:  RELPAX Take 1 Tab by mouth once as needed for Migraine for up to 1 dose. may repeat in 2 hours if necessary  
  
 fluticasone-salmeterol 500-50 mcg/dose diskus inhaler Commonly known as:  ADVAIR Take 1 Puff by inhalation every twelve (12) hours. gabapentin 400 mg capsule Commonly known as:  NEURONTIN Take 1 Cap by mouth three (3) times daily. montelukast 10 mg tablet Commonly known as:  SINGULAIR  
take 1 tablet by mouth once daily NAMENDA XR 28 mg capsule Generic drug:  memantine ER Take 28 mg by mouth daily. PARoxetine 20 mg tablet Commonly known as:  PAXIL Take 1 Tab by mouth daily. simvastatin 20 mg tablet Commonly known as:  ZOCOR Take 1 Tab by mouth nightly. topiramate 100 mg tablet Commonly known as:  TOPAMAX Take 1 Tab by mouth two (2) times a day. traZODone 100 mg tablet Commonly known as:  Emaline Sober Take 1 Tab by mouth nightly as needed for Sleep. * Notice: This list has 2 medication(s) that are the same as other medications prescribed for you. Read the directions carefully, and ask your doctor or other care provider to review them with you. Introducing [facility] & HEALTH SERVICES! Odalis Aguilar introduces Techgenia patient portal. Now you can access parts of your medical record, email your doctor's office, and request medication refills online. 1. In your internet browser, go to [email or web address] 2. Click on the First Time User? Click Here link in the Sign In box. You will see the New Member Sign Up page. 3. Enter your Techgenia Access Code exactly as it appears below. You will not need to use this code after youve completed the sign-up process. If you do not sign up before the expiration date, you must request a new code. · Techgenia Access Code: [email or web address] Expires: 10/25/2017 10:31 AM 
 
4.  Enter the last four digits of your Social Security Number (xxxx) and Date of Birth (mm/dd/yyyy) as indicated and click Submit. You will be taken to the next sign-up page. 5. Create a HubSpot ID. This will be your HubSpot login ID and cannot be changed, so think of one that is secure and easy to remember. 6. Create a HubSpot password. You can change your password at any time. 7. Enter your Password Reset Question and Answer. This can be used at a later time if you forget your password. 8. Enter your e-mail address. You will receive e-mail notification when new information is available in [address] 9. Click Sign Up. You can now view and download portions of your medical record. 10. Click the Download Summary menu link to download a portable copy of your medical information. If you have questions, please visit the Frequently Asked Questions section of the HubSpot website. Remember, HubSpot is NOT to be used for urgent needs. For medical emergencies, dial 911. Now available from your iPhone and Android! Please provide this summary of care documentation to your next provider. Your primary care clinician is listed as Siddhartha Martínez. If you have any questions after today's visit, please call [phone].

## 2017-10-17 DIAGNOSIS — M79.7 FIBROMYALGIA: ICD-10-CM

## 2017-10-17 DIAGNOSIS — M54.50 ACUTE BILATERAL LOW BACK PAIN WITHOUT SCIATICA: ICD-10-CM

## 2017-10-17 RX ORDER — GABAPENTIN 400 MG/1
CAPSULE ORAL
Qty: 90 CAP | Refills: 1 | Status: SHIPPED | OUTPATIENT
Start: 2017-10-17 | End: 2017-12-11 | Stop reason: SDUPTHER

## 2017-11-06 ENCOUNTER — TELEPHONE (OUTPATIENT)
Dept: FAMILY MEDICINE CLINIC | Age: 57
End: 2017-11-06

## 2017-11-09 ENCOUNTER — TELEPHONE (OUTPATIENT)
Dept: NEUROLOGY | Age: 57
End: 2017-11-09

## 2017-11-09 DIAGNOSIS — Z86.69 HX OF MIGRAINES: Primary | ICD-10-CM

## 2017-11-09 RX ORDER — METHYLPREDNISOLONE 4 MG/1
TABLET ORAL
Qty: 1 DOSE PACK | Refills: 0 | Status: SHIPPED | OUTPATIENT
Start: 2017-11-09 | End: 2017-12-05

## 2017-11-09 NOTE — TELEPHONE ENCOUNTER
Pt is wondering if any pain medicine can be called in for her because she is having bad migraines.  Please call back

## 2017-11-09 NOTE — TELEPHONE ENCOUNTER
Referral completed.
Westley/DAWNA george  Need Neurology order in [address] on [facility]    Apt: 12/5/2017 with Araseli Bocanegra  Phone #[phone]
Acute on chronic diastolic congestive heart failure

## 2017-12-05 ENCOUNTER — OFFICE VISIT (OUTPATIENT)
Dept: NEUROLOGY | Age: 57
End: 2017-12-05

## 2017-12-05 VITALS
OXYGEN SATURATION: 99 % | WEIGHT: 164 LBS | RESPIRATION RATE: 18 BRPM | HEART RATE: 63 BPM | BODY MASS INDEX: 25.69 KG/M2 | DIASTOLIC BLOOD PRESSURE: 78 MMHG | SYSTOLIC BLOOD PRESSURE: 114 MMHG

## 2017-12-05 DIAGNOSIS — F32.A DEPRESSION, UNSPECIFIED DEPRESSION TYPE: ICD-10-CM

## 2017-12-05 RX ORDER — TOPIRAMATE 100 MG/1
100 TABLET, FILM COATED ORAL 2 TIMES DAILY
Qty: 60 TAB | Refills: 3 | Status: SHIPPED | OUTPATIENT
Start: 2017-12-05 | End: 2018-05-01 | Stop reason: SDUPTHER

## 2017-12-05 RX ORDER — DIVALPROEX SODIUM 500 MG/1
500 TABLET, DELAYED RELEASE ORAL 2 TIMES DAILY
Qty: 60 TAB | Refills: 3 | Status: SHIPPED | OUTPATIENT
Start: 2017-12-05 | End: 2018-05-01 | Stop reason: SDUPTHER

## 2017-12-05 NOTE — MR AVS SNAPSHOT
Visit Information Date & Time Provider Department Dept. Phone Encounter #  
 12/5/2017  1:30 PM Day Choi, [address] Neurology Clinic at [address] [id number] Follow-up Instructions Return in about 4 months (around 4/1/2018). Your Appointments 12/11/2017  1:00 PM  
Medicare Physical with Anabel Brantley MD  
[address]) Appt Note: 3 mo f/u; [facility] Wellness Annual  
 [address] [id number]  
[phone]  
  
   
 [facility] [id number]  
  
    
 3/13/2018  9:00 AM  
New Patient with Mallory Krishna MD  
[address] ([address]) Appt Note: NP est. Pain; same  
 [address] [facility] [address]  
[phone]  
  
   
 [address] [address] Upcoming Health Maintenance Date Due Hepatitis C Screening 1960 FOBT Q 1 YEAR AGE 50-75 3/22/2017 MEDICARE YEARLY EXAM 12/14/2017 BREAST CANCER SCRN MAMMOGRAM 6/16/2019 PAP AKA CERVICAL CYTOLOGY 12/13/2019 DTaP/Tdap/Td series (2 - Td) 12/13/2026 Allergies as of 12/5/2017  Review Complete On: 12/5/2017 By: Day Choi, DO Severity Noted Reaction Type Reactions Ambien [Zolpidem]  09/09/2016    Unknown (comments) Causes Migraine Cephalexin  03/17/2016    Rash Morphine  09/01/2015    Rash Motrin [Ibuprofen]  03/11/2015    Swelling Nortriptyline  09/09/2016    Unknown (comments) Bleeding from mouth Pepcid [Famotidine]  07/03/2017    Other (comments) Migraines. Protonix [Pantoprazole]  03/17/2016    Rash Vicodin [Hydrocodone-acetaminophen]  09/09/2016    Unknown (comments) Causes Migraines Vitamins For Infusion  09/09/2016    Unknown (comments) Pt stated all vitamins cause her lightheadness and sick Current Immunizations  Reviewed on 6/15/2017 Name Date Influenza High Dose Vaccine PF 8/15/2016 Influenza Vaccine 9/1/2015 Influenza Vaccine (Quad) PF 8/1/2017 Pneumococcal Vaccine (Unspecified Type) 9/1/2015 Tdap 12/13/2016 Zoster Vaccine, Live 6/13/2017 Not reviewed this visit You Were Diagnosed With   
  
 Codes Comments Chronic migraine    -  Primary ICD-10-CM: N30.562 ICD-9-CM: 346.70 Depression, unspecified depression type     ICD-10-CM: F32.9 ICD-9-CM: 784 Vitals BP Pulse Resp Weight(growth percentile) SpO2 BMI  
 114/78 63 18 164 lb (74.4 kg) 99% 25.69 kg/m2 OB Status Smoking Status Hysterectomy Never Smoker Vitals History BMI and BSA Data Body Mass Index Body Surface Area  
 25.69 kg/m 2 1.88 m 2 Preferred Pharmacy Pharmacy Name Phone [address] [place] [address] [phone] Your Updated Medication List  
  
   
This list is accurate as of: 12/5/17  2:00 PM.  Always use your most recent med list.  
  
  
  
  
 albuterol 2.5 mg /3 mL (0.083 %) nebulizer solution Commonly known as:  PROVENTIL VENTOLIN  
by Nebulization route once. aspirin 81 mg chewable tablet Take 81 mg by mouth daily. * buPROPion  mg tablet Commonly known as:  Brigido Corners Take 1 Tab by mouth every morning. * buPROPion 75 mg tablet Commonly known as:  WELLBUTRIN  
take 1 tablet by mouth at bedtime Cetirizine 10 mg Cap Take  by mouth. divalproex  mg tablet Commonly known as:  DEPAKOTE Take 1 Tab by mouth two (2) times a day. fluticasone-salmeterol 500-50 mcg/dose diskus inhaler Commonly known as:  ADVAIR Take 1 Puff by inhalation every twelve (12) hours. gabapentin 400 mg capsule Commonly known as:  NEURONTIN  
take 1 capsule by mouth three times a day  
  
 montelukast 10 mg tablet Commonly known as:  SINGULAIR  
take 1 tablet by mouth once daily PARoxetine 20 mg tablet Commonly known as:  PAXIL Take 1 Tab by mouth daily. simvastatin 20 mg tablet Commonly known as:  ZOCOR Take 1 Tab by mouth nightly. topiramate 100 mg tablet Commonly known as:  TOPAMAX Take 1 Tab by mouth two (2) times a day. traZODone 100 mg tablet Commonly known as:  Rebbecca Bunde Take 1 Tab by mouth nightly as needed for Sleep. * Notice: This list has 2 medication(s) that are the same as other medications prescribed for you. Read the directions carefully, and ask your doctor or other care provider to review them with you. Prescriptions Sent to Pharmacy Refills  
 topiramate (TOPAMAX) 100 mg tablet 3 Sig: Take 1 Tab by mouth two (2) times a day. Class: Normal  
 Pharmacy: [address] Ph #: [phone] Route: Oral  
 divalproex DR (DEPAKOTE) 500 mg tablet 3 Sig: Take 1 Tab by mouth two (2) times a day. Class: Normal  
 Pharmacy: [address] Ph #: [phone] Route: Oral  
  
Follow-up Instructions Return in about 4 months (around 4/1/2018). Introducing [facility] & HEALTH SERVICES! [employer] introduces Moodswiing patient portal. Now you can access parts of your medical record, email your doctor's office, and request medication refills online. 1. In your internet browser, go to [email or web address] 2. Click on the First Time User? Click Here link in the Sign In box. You will see the New Member Sign Up page. 3. Enter your Moodswiing Access Code exactly as it appears below. You will not need to use this code after youve completed the sign-up process. If you do not sign up before the expiration date, you must request a new code. · Moodswiing Access Code: [email or web address] Expires: 3/5/2018  1:25 PM 
 
4.  Enter the last four digits of your Social Security Number (xxxx) and Date of Birth (mm/dd/yyyy) as indicated and click Submit. You will be taken to the next sign-up page. 5. Create a Top10 Media ID. This will be your Top10 Media login ID and cannot be changed, so think of one that is secure and easy to remember. 6. Create a Top10 Media password. You can change your password at any time. 7. Enter your Password Reset Question and Answer. This can be used at a later time if you forget your password. 8. Enter your e-mail address. You will receive e-mail notification when new information is available in [address] 9. Click Sign Up. You can now view and download portions of your medical record. 10. Click the Download Summary menu link to download a portable copy of your medical information. If you have questions, please visit the Frequently Asked Questions section of the Top10 Media website. Remember, Top10 Media is NOT to be used for urgent needs. For medical emergencies, dial 911. Now available from your iPhone and Android! Please provide this summary of care documentation to your next provider. Your primary care clinician is listed as Giovana Aceves. If you have any questions after today's visit, please call [phone].

## 2017-12-05 NOTE — PROGRESS NOTES
Neurology Clinic Follow up Note    Patient ID:  Bereket Ramos  [id number]  62 y.o.  1960      Ms. Katiana Espinoza is here for follow up today of migraines. Last Appointment With Me:  9/27/2017       Interval History:   Currently endorsing headache 3x weekly, less severe overall. She is using Excedrin sparingly which is not very helpful for migraines. Did not use Relpax due to pharmacy shortage. Previously failed Imitrex/Maxalt. Recently completed medrol dose pack due to chronic headaches which appeared to be helpful. She remains on TPM 100mg BID. No reported side effects. PMHx/ PSHx/ FHx/ SHx:  Reviewed and unchanged previous visit. Past Medical History:   Diagnosis Date    Anal fistula     Asthma     Back ache     CAD (coronary artery disease)     leaky valves    Cholesterol blood decreased     Dementia     Depression     Deviated septum     Diabetes (HCC)     pre diabetese    Fibromyalgia     Frequent headaches     Heart valve disease     Herniated nucleus pulposus, L5-S1     Migraine     Protein S deficiency (HCC)     Pulmonary emboli (HCC)     Pulmonary hypertension     Restless leg syndrome     RLS (restless legs syndrome)     Sickle cell trait (HCC)     Sleep apnea     Stroke (HCC)     Upper airway resistance syndrome     Vaginal fistula          ROS:  Comprehensive review of systems negative except for as noted above. Objective:       Meds:  Current Outpatient Prescriptions   Medication Sig Dispense Refill    gabapentin (NEURONTIN) 400 mg capsule take 1 capsule by mouth three times a day 90 Cap 1    buPROPion (WELLBUTRIN) 75 mg tablet take 1 tablet by mouth at bedtime  0    divalproex ER (DEPAKOTE ER) 250 mg ER tablet take 1 tablet by mouth twice a day  0    topiramate (TOPAMAX) 100 mg tablet Take 1 Tab by mouth two (2) times a day.  60 Tab 2    montelukast (SINGULAIR) 10 mg tablet take 1 tablet by mouth once daily 30 Tab 11    simvastatin (ZOCOR) 20 mg tablet Take 1 Tab by mouth nightly. 90 Tab 1    PARoxetine (PAXIL) 20 mg tablet Take 1 Tab by mouth daily. 30 Tab 3    traZODone (DESYREL) 100 mg tablet Take 1 Tab by mouth nightly as needed for Sleep. (Patient taking differently: Take 200 mg by mouth nightly as needed for Sleep.) 25 Tab 0    buPROPion XL (WELLBUTRIN XL) 150 mg tablet Take 1 Tab by mouth every morning. 25 Tab 0    aspirin 81 mg chewable tablet Take 81 mg by mouth daily.  Cetirizine 10 mg cap Take  by mouth.  fluticasone-salmeterol (ADVAIR) 500-50 mcg/dose diskus inhaler Take 1 Puff by inhalation every twelve (12) hours.  albuterol (PROVENTIL VENTOLIN) 2.5 mg /3 mL (0.083 %) nebulizer solution by Nebulization route once. Exam:  Visit Vitals    /78    Pulse 63    Resp 18    Wt 74.4 kg (164 lb)    SpO2 99%    BMI 25.69 kg/m2     NEUROLOGICAL EXAM:  General: Awake, alert, speech fluent  CN: PERRL, EOMI without nystagmus, VFF to confrontation, facial sensation and strength are normal and symmetric, hearing is intact to finger rub bilaterally, palate and tongue movements are intact and symmetric. Motor: Normal tone, bulk and strength bilaterally. Reflexes: 2/4 and symmetric, plantar stimulation is flexor. Coordination: FNF, MAMADOU, HTS intact. Sensation: LT intact throughout. Gait: Normal-based and steady.         LABS  Results for orders placed or performed in visit on 09/13/17   TERENCE BY MULTIPLEX FLOW IA, QL   Result Value Ref Range    Antinuclear Antibodies Direct Negative Negative   RHEUMATOID FACTOR, QL   Result Value Ref Range    Rheumatoid factor <10.0 0.0 - 45.6 IU/mL   METABOLIC PANEL, COMPREHENSIVE   Result Value Ref Range    Glucose 89 65 - 99 mg/dL    BUN 15 6 - 24 mg/dL    Creatinine 0.90 0.57 - 1.00 mg/dL    GFR est non-AA 73 >59 mL/min/1.73    GFR est AA 84 >59 mL/min/1.73    BUN/Creatinine ratio 17 9 - 23    Sodium 142 134 - 144 mmol/L    Potassium 4.1 3.5 - 5.2 mmol/L    Chloride 103 96 - 106 mmol/L CO2 25 18 - 29 mmol/L    Calcium 8.8 8.7 - 10.2 mg/dL    Protein, total 6.5 6.0 - 8.5 g/dL    Albumin 3.9 3.5 - 5.5 g/dL    GLOBULIN, TOTAL 2.6 1.5 - 4.5 g/dL    A-G Ratio 1.5 1.2 - 2.2    Bilirubin, total <0.2 0.0 - 1.2 mg/dL    Alk. phosphatase 86 39 - 117 IU/L    AST (SGOT) 15 0 - 40 IU/L    ALT (SGPT) 10 0 - 32 IU/L   HEMOGLOBIN A1C WITH EAG   Result Value Ref Range    Hemoglobin A1c 5.4 4.8 - 5.6 %    Estimated average glucose 108 mg/dL       IMAGING:  MRI Results (most recent):  No results found for this or any previous visit. Assessment:     Encounter Diagnoses     ICD-10-CM ICD-9-CM   1. Chronic migraine G43.709 346.70   2. Depression, unspecified depression type F289 32   49year old AAF with a h/o depression, fibromyalgia here for f/u of chronic migraines since age 17, L hemispheric. She has noted a slight decrease in severity once TPM was resumed. Frequency is unchanged. Discussed titration of VPA to assist with migraines. She has failed TCA therapy in the past due to side effects and is already on multiple antidepressants. If no significant response at f/u, may need to consider botox injections for refractory migraines. Headache education  Discussed treatment options, both abortive and preventive medications. Instructed patient about medications and potential side effects. Discussed medication overuse headache and to limit use of analgesics to less than 2 doses per week. Plan:   Trial of Sprix for rescue headache tx  Cont. TPM 100mg BID for preventative HA therapy  Increase VPA to 250mg AM and 500mg PM x 1 week, then increase to 500mg BID    Follow-up Disposition:  Return in about 4 months (around 4/1/2018). The duration of this appointment visit was 25 minutes of face-to-face time with the patient. At least 50% of this time was spent in counseling, explanation of diagnosis, planning of further management, and coordination of care.       Signed:  Dmitry Govea DO  12/5/2017  1:44 PM

## 2017-12-11 ENCOUNTER — OFFICE VISIT (OUTPATIENT)
Dept: FAMILY MEDICINE CLINIC | Age: 57
End: 2017-12-11

## 2017-12-11 VITALS
SYSTOLIC BLOOD PRESSURE: 125 MMHG | OXYGEN SATURATION: 98 % | HEART RATE: 56 BPM | TEMPERATURE: 97.8 F | DIASTOLIC BLOOD PRESSURE: 67 MMHG | RESPIRATION RATE: 18 BRPM | BODY MASS INDEX: 24.96 KG/M2 | WEIGHT: 159 LBS | HEIGHT: 67 IN

## 2017-12-11 DIAGNOSIS — R73.03 PREDIABETES: ICD-10-CM

## 2017-12-11 DIAGNOSIS — M79.7 FIBROMYALGIA: ICD-10-CM

## 2017-12-11 DIAGNOSIS — Z71.89 ADVANCED DIRECTIVES, COUNSELING/DISCUSSION: ICD-10-CM

## 2017-12-11 DIAGNOSIS — M54.50 ACUTE BILATERAL LOW BACK PAIN WITHOUT SCIATICA: ICD-10-CM

## 2017-12-11 DIAGNOSIS — Z11.59 NEED FOR HEPATITIS C SCREENING TEST: ICD-10-CM

## 2017-12-11 DIAGNOSIS — Z00.00 MEDICARE ANNUAL WELLNESS VISIT, SUBSEQUENT: Primary | ICD-10-CM

## 2017-12-11 RX ORDER — GABAPENTIN 400 MG/1
CAPSULE ORAL
Qty: 270 CAP | Refills: 1 | Status: SHIPPED | OUTPATIENT
Start: 2017-12-11 | End: 2018-08-28 | Stop reason: SDUPTHER

## 2017-12-11 NOTE — MR AVS SNAPSHOT
Visit Information Date & Time Provider Department Dept. Phone Encounter #  
 12/11/2017  1:00 PM Roc Karimi MD [address] [facility] [id number] Follow-up Instructions Return in about 3 months (around 3/11/2018), or if symptoms worsen or fail to improve. Your Appointments 3/13/2018  9:00 AM  
New Patient with Dru Sol MD  
[address] ([address]) Appt Note: NP est. Pain; same  
 [facility] [address] [facility] [address]  
  
    
 3/27/2018  1:40 PM  
Follow Up with Aby Olvera  [facility] [address] [facility]) Appt Note: 4 week f/u headache  
 [address] [facility] [address]  
[phone]  
  
   
 [address] [address] Upcoming Health Maintenance Date Due  
 MEDICARE YEARLY EXAM 12/14/2017 BREAST CANCER SCRN MAMMOGRAM 6/16/2019 PAP AKA CERVICAL CYTOLOGY 12/13/2019 COLONOSCOPY 3/22/2026 DTaP/Tdap/Td series (2 - Td) 12/13/2026 Allergies as of 12/11/2017  Review Complete On: 12/11/2017 By: Michael Grullon LPN Severity Noted Reaction Type Reactions Ambien [Zolpidem]  09/09/2016    Unknown (comments) Causes Migraine Cephalexin  03/17/2016    Rash Morphine  09/01/2015    Rash Motrin [Ibuprofen]  03/11/2015    Swelling Nortriptyline  09/09/2016    Unknown (comments) Bleeding from mouth Pepcid [Famotidine]  07/03/2017    Other (comments) Migraines. Protonix [Pantoprazole]  03/17/2016    Rash Vicodin [Hydrocodone-acetaminophen]  09/09/2016    Unknown (comments) Causes Migraines Vitamins For Infusion  09/09/2016    Unknown (comments) Pt stated all vitamins cause her lightheadness and sick Current Immunizations  Reviewed on 6/15/2017 Name Date Influenza High Dose Vaccine PF 8/15/2016 Influenza Vaccine 9/1/2015 Influenza Vaccine (Quad) PF 8/1/2017 Pneumococcal Vaccine (Unspecified Type) 9/1/2015 Tdap 12/13/2016 Zoster Vaccine, Live 6/13/2017 Not reviewed this visit You Were Diagnosed With   
  
 Codes Comments Prediabetes    -  Primary ICD-10-CM: R73.03 
ICD-9-CM: 790.29 Need for hepatitis C screening test     ICD-10-CM: Z11.59 
ICD-9-CM: V73.89 Acute bilateral low back pain without sciatica     ICD-10-CM: M54.5 ICD-9-CM: 724.2, 338.19 Fibromyalgia     ICD-10-CM: M79.7 ICD-9-CM: 729.1 Medicare annual wellness visit, subsequent     ICD-10-CM: Z00.00 ICD-9-CM: V70.0 Advanced directives, counseling/discussion     ICD-10-CM: Z71.89 ICD-9-CM: V65.49 Vitals BP Pulse Temp Resp Height(growth percentile) Weight(growth percentile) 125/67 (!) 56 97.8 °F (36.6 °C) (Oral) 18 5' 7\" (1.702 m) 159 lb (72.1 kg) SpO2 BMI OB Status Smoking Status 98% 24.9 kg/m2 Hysterectomy Never Smoker Vitals History BMI and BSA Data Body Mass Index Body Surface Area 24.9 kg/m 2 1.85 m 2 [facility] [address] [phone] Your Updated Medication List  
  
   
This list is accurate as of: 12/11/17  1:41 PM.  Always use your most recent med list.  
  
  
  
  
 albuterol 2.5 mg /3 mL (0.083 %) nebulizer solution Commonly known as:  PROVENTIL VENTOLIN  
by Nebulization route once. aspirin 81 mg chewable tablet Take 81 mg by mouth daily. * buPROPion  mg tablet Commonly known as:  Juan Greaser Take 1 Tab by mouth every morning. * buPROPion 75 mg tablet Commonly known as:  WELLBUTRIN  
take 1 tablet by mouth at bedtime Cetirizine 10 mg Cap Take  by mouth. divalproex  mg tablet Commonly known as:  DEPAKOTE Take 1 Tab by mouth two (2) times a day. fluticasone-salmeterol 500-50 mcg/dose diskus inhaler Commonly known as:  ADVAIR Take 1 Puff by inhalation every twelve (12) hours. gabapentin 400 mg capsule Commonly known as:  NEURONTIN  
take 1 capsule by mouth three times a day  
  
 montelukast 10 mg tablet Commonly known as:  SINGULAIR  
take 1 tablet by mouth once daily PARoxetine 20 mg tablet Commonly known as:  PAXIL Take 1 Tab by mouth daily. simvastatin 20 mg tablet Commonly known as:  ZOCOR Take 1 Tab by mouth nightly. topiramate 100 mg tablet Commonly known as:  TOPAMAX Take 1 Tab by mouth two (2) times a day. traZODone 100 mg tablet Commonly known as:  Don Handy Take 1 Tab by mouth nightly as needed for Sleep. * Notice: This list has 2 medication(s) that are the same as other medications prescribed for you. Read the directions carefully, and ask your doctor or other care provider to review them with you. Prescriptions Sent to Pharmacy Refills  
 gabapentin (NEURONTIN) 400 mg capsule 1 Sig: take 1 capsule by mouth three times a day Class: Normal  
 Pharmacy: [address] Ph #: [phone] We Performed the Following ADVANCE CARE PLANNING FIRST 30 MINS [93653 CPT(R)] HEMOGLOBIN A1C WITH EAG [04889 CPT(R)] HEPATITIS C AB [51088 CPT(R)] METABOLIC PANEL, COMPREHENSIVE [34186 CPT(R)] Follow-up Instructions Return in about 3 months (around 3/11/2018), or if symptoms worsen or fail to improve. Patient Instructions Medicare Wellness Visit, Female The best way to live healthy is to have a healthy lifestyle by eating a well-balanced diet, exercising regularly, limiting alcohol and stopping smoking. Regular physical exams and screening tests are another way to keep healthy.  Preventive exams provided by your health care provider can find health problems before they become diseases or illnesses. Preventive services including immunizations, screening tests, monitoring and exams can help you take care of your own health. All people over age 72 should have a pneumovax  and and a prevnar shot to prevent pneumonia. These are once in a lifetime unless you and your provider decide differently. All people over 65 should have a yearly flu shot and a tetanus vaccine every 10 years. A bone mass density to screen for osteoporosis or thinning of the bones should be done every 2 years after 65. Screening for diabetes mellitus with a blood sugar test should be done every year. Glaucoma is a disease of the eye due to increased ocular pressure that can lead to blindness and it should be done every year by an eye professional. 
 
Cardiovascular screening tests that check for elevated lipids (fatty part of blood) which can lead to heart disease and strokes should be done every 5 years. Colorectal screening that evaluates for blood or polyps in your colon should be done yearly as a stool test or every five years as a flexible sigmoidoscope or every 10 years as a colonoscopy up to age 76. Breast cancer screening with a mammogram is recommended biennially  for women age 54-69. Screening for cervical cancer with a pap smear and pelvic exam is recommended for women after age 72 years every 2 years up to age 79 or when the provider and patient decide to stop. If there is a history of cervical abnormalities or other increased risk for cancer then the test is recommended yearly. Hepatitis C screening is also recommended for anyone born between 80 through [address]. A shingles vaccine is also recommended once in a lifetime after age 61. Your Medicare Wellness Exam is recommended annually. Here is a list of your current Health Maintenance items with a due date: There are no preventive care reminders to display for this patient. Introducing [facility]! Sergio Phillips introduces [employer] patient portal. Now you can access parts of your medical record, email your doctor's office, and request medication refills online. 1. In your internet browser, go to [email or web address] 2. Click on the First Time User? Click Here link in the Sign In box. You will see the New Member Sign Up page. 3. Enter your [employer] Access Code exactly as it appears below. You will not need to use this code after youve completed the sign-up process. If you do not sign up before the expiration date, you must request a new code. · [employer] Access Code: [email or web address] Expires: 3/5/2018  1:25 PM 
 
4. Enter the last four digits of your Social Security Number (xxxx) and Date of Birth (mm/dd/yyyy) as indicated and click Submit. You will be taken to the next sign-up page. 5. Create a [employer] ID. This will be your [employer] login ID and cannot be changed, so think of one that is secure and easy to remember. 6. Create a [employer] password. You can change your password at any time. 7. Enter your Password Reset Question and Answer. This can be used at a later time if you forget your password. 8. Enter your e-mail address. You will receive e-mail notification when new information is available in [address] 9. Click Sign Up. You can now view and download portions of your medical record. 10. Click the Download Summary menu link to download a portable copy of your medical information. If you have questions, please visit the Frequently Asked Questions section of the [employer] website. Remember, [employer] is NOT to be used for urgent needs. For medical emergencies, dial 911. Now available from your iPhone and Android! Please provide this summary of care documentation to your next provider. Your primary care clinician is listed as Royal Warner. If you have any questions after today's visit, please call [phone].

## 2017-12-11 NOTE — ACP (ADVANCE CARE PLANNING)
Advance Care Planning    Advance Care Planning (ACP) Provider Conversation Snapshot    Date of ACP Conversation: 12/11/17  Persons included in Conversation:  patient  Length of ACP Conversation in minutes:  16 minutes    Authorized Decision Maker (if patient is incapable of making informed decisions): This person is:   Healthcare Agent/Medical Power of  under Advance Directive          For Patients with Decision Making Capacity:   Values/Goals: Exploration of values, goals, and preferences if recovery is not expected, even with continued medical treatment in the event of:  Imminent death  Severe, permanent brain injury  \"In these circumstances, what matters most to you? \"  Care focused more on comfort or quality of life.     Conversation Outcomes / Follow-Up Plan:   Completed new Advance Directive

## 2017-12-11 NOTE — PROGRESS NOTES
1. Have you been to the ER, urgent care clinic since your last visit? Hospitalized since your last visit? No    2. Have you seen or consulted any other health care providers outside of the [address] [place] [address] since your last visit? Include any pap smears or colon screening.  No   Chief Complaint   Patient presents with   Matthew Chang Annual Wellness Visit

## 2017-12-11 NOTE — PROGRESS NOTES
This is a Subsequent Medicare Annual Wellness Exam (AWV) (Performed 12 months after IPPE or effective date of Medicare Part B enrollment)    I have reviewed the patient's medical history in detail and updated the computerized patient record. History     Past Medical History:   Diagnosis Date    Anal fistula     Asthma     Back ache     CAD (coronary artery disease)     leaky valves    Cholesterol blood decreased     Dementia     Depression     Deviated septum     Diabetes (HCC)     pre diabetese    Fibromyalgia     Frequent headaches     Heart valve disease     Herniated nucleus pulposus, L5-S1     Migraine     Protein S deficiency (HCC)     Pulmonary emboli (HCC)     Pulmonary hypertension     Restless leg syndrome     RLS (restless legs syndrome)     Sickle cell trait ([facility])     Sleep apnea     Stroke ([facility] [address]     Upper airway resistance syndrome     Vaginal fistula       Past Surgical History:   Procedure Laterality Date    CARDIAC SURG PROCEDURE UNLIST  9/23/99    closure of unroofed coronary sinus    HX ROTATOR CUFF REPAIR      3/2017     Current Outpatient Prescriptions   Medication Sig Dispense Refill    gabapentin (NEURONTIN) 400 mg capsule take 1 capsule by mouth three times a day 270 Cap 1    topiramate (TOPAMAX) 100 mg tablet Take 1 Tab by mouth two (2) times a day. 60 Tab 3    divalproex DR (DEPAKOTE) 500 mg tablet Take 1 Tab by mouth two (2) times a day. 60 Tab 3    buPROPion (WELLBUTRIN) 75 mg tablet take 1 tablet by mouth at bedtime  0    montelukast (SINGULAIR) 10 mg tablet take 1 tablet by mouth once daily 30 Tab 11    simvastatin (ZOCOR) 20 mg tablet Take 1 Tab by mouth nightly. 90 Tab 1    PARoxetine (PAXIL) 20 mg tablet Take 1 Tab by mouth daily. 30 Tab 3    traZODone (DESYREL) 100 mg tablet Take 1 Tab by mouth nightly as needed for Sleep.  (Patient taking differently: Take 200 mg by mouth nightly as needed for Sleep.) 25 Tab 0    buPROPion XL (WELLBUTRIN XL) 150 mg tablet Take 1 Tab by mouth every morning. 25 Tab 0    aspirin 81 mg chewable tablet Take 81 mg by mouth daily.  Cetirizine 10 mg cap Take  by mouth.  fluticasone-salmeterol (ADVAIR) 500-50 mcg/dose diskus inhaler Take 1 Puff by inhalation every twelve (12) hours.  albuterol (PROVENTIL VENTOLIN) 2.5 mg /3 mL (0.083 %) nebulizer solution by Nebulization route once. Allergies   Allergen Reactions    Ambien [Zolpidem] Unknown (comments)     Causes Migraine    Cephalexin Rash    Morphine Rash    Motrin [Ibuprofen] Swelling    Nortriptyline Unknown (comments)     Bleeding from mouth    Pepcid [Famotidine] Other (comments)     Migraines.     Protonix [Pantoprazole] Rash    Vicodin [Hydrocodone-Acetaminophen] Unknown (comments)     Causes Migraines    Vitamins For Infusion Unknown (comments)     Pt stated all vitamins cause her lightheadness and sick     Family History   Problem Relation Age of Onset    Diabetes Mother     Heart Disease Mother     Hypertension Mother     Sickle Cell Anemia Sister     Hypertension Brother     Stroke Maternal Aunt      Social History   Substance Use Topics    Smoking status: Never Smoker    Smokeless tobacco: Never Used    Alcohol use No     Patient Active Problem List   Diagnosis Code    Pulmonary embolism (HCC) I26.99    Prediabetes R73.03    Seasonal allergic rhinitis J30.2    Mild intermittent asthma without complication K15.03    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    Depression F32.9    Rectal vaginal fistula N82.3    Valvular disease I38       Depression Risk Factor Screening:     PHQ over the last two weeks 6/15/2017   PHQ Not Done Active Diagnosis of Depression or Bipolar Disorder   Little interest or pleasure in doing things -   Feeling down, depressed or hopeless -   Total Score PHQ 2 -   Trouble falling or staying asleep, or sleeping too much -   Feeling tired or having little energy -   Poor appetite or overeating -   Feeling bad about yourself - or that you are a failure or have let yourself or your family down -   Trouble concentrating on things such as school, work, reading or watching TV -   Moving or speaking so slowly that other people could have noticed; or the opposite being so fidgety that others notice -   Thoughts of being better off dead, or hurting yourself in some way -   PHQ 9 Score -   How difficult have these problems made it for you to do your work, take care of your home and get along with others -     Alcohol Risk Factor Screening: You do not drink alcohol or very rarely. Functional Ability and Level of Safety:   Hearing Loss  Hearing is good. Activities of Daily Living  The home contains: no safety equipment. Patient does total self care    Fall Risk  Fall Risk Assessment, last 12 mths 12/13/2016   Able to walk? Yes   Fall in past 12 months? No       Abuse Screen  Patient is not abused    Cognitive Screening   Evaluation of Cognitive Function:  Has your family/caregiver stated any concerns about your memory: no  Normal    Patient Care Team   Patient Care Team:  Shirley Conley MD as PCP - General (Family Practice)  Red Quan MD as Physician (Sleep Medicine)    Assessment/Plan   Education and counseling provided:  Are appropriate based on today's review and evaluation  End-of-Life planning (with patient's consent)    Diagnoses and all orders for this visit:    1. Medicare annual wellness visit, subsequent    2. Advanced directives, counseling/discussion  -     ADVANCE CARE PLANNING FIRST 30 MINS    3. Prediabetes  -     HEMOGLOBIN A1C WITH EAG  -     METABOLIC PANEL, COMPREHENSIVE    4. Need for hepatitis C screening test  -     HEPATITIS C AB    5. Acute bilateral low back pain without sciatica  -     gabapentin (NEURONTIN) 400 mg capsule; take 1 capsule by mouth three times a day    6.  Fibromyalgia  -     gabapentin (NEURONTIN) 400 mg capsule; take 1 capsule by mouth three times a day        There are no preventive care reminders to display for this patient.

## 2017-12-12 LAB
ALBUMIN SERPL-MCNC: 4.3 G/DL (ref 3.5–5.5)
ALBUMIN/GLOB SERPL: 1.7 {RATIO} (ref 1.2–2.2)
ALP SERPL-CCNC: 76 IU/L (ref 39–117)
ALT SERPL-CCNC: 15 IU/L (ref 0–32)
AST SERPL-CCNC: 13 IU/L (ref 0–40)
BILIRUB SERPL-MCNC: <0.2 MG/DL (ref 0–1.2)
BUN SERPL-MCNC: 19 MG/DL (ref 6–24)
BUN/CREAT SERPL: 24 (ref 9–23)
CALCIUM SERPL-MCNC: 9.1 MG/DL (ref 8.7–10.2)
CHLORIDE SERPL-SCNC: 105 MMOL/L (ref 96–106)
CO2 SERPL-SCNC: 24 MMOL/L (ref 18–29)
CREAT SERPL-MCNC: 0.8 MG/DL (ref 0.57–1)
EST. AVERAGE GLUCOSE BLD GHB EST-MCNC: 111 MG/DL
GFR SERPLBLD CREATININE-BSD FMLA CKD-EPI: 83 ML/MIN/1.73
GFR SERPLBLD CREATININE-BSD FMLA CKD-EPI: 96 ML/MIN/1.73
GLOBULIN SER CALC-MCNC: 2.5 G/DL (ref 1.5–4.5)
GLUCOSE SERPL-MCNC: 78 MG/DL (ref 65–99)
HBA1C MFR BLD: 5.5 % (ref 4.8–5.6)
HCV AB S/CO SERPL IA: <0.1 S/CO RATIO (ref 0–0.9)
POTASSIUM SERPL-SCNC: 4.4 MMOL/L (ref 3.5–5.2)
PROT SERPL-MCNC: 6.8 G/DL (ref 6–8.5)
SODIUM SERPL-SCNC: 144 MMOL/L (ref 134–144)

## 2018-03-08 ENCOUNTER — OFFICE VISIT (OUTPATIENT)
Dept: NEUROLOGY | Age: 58
End: 2018-03-08

## 2018-03-08 ENCOUNTER — TELEPHONE (OUTPATIENT)
Dept: NEUROLOGY | Age: 58
End: 2018-03-08

## 2018-03-08 VITALS
RESPIRATION RATE: 18 BRPM | OXYGEN SATURATION: 99 % | BODY MASS INDEX: 25.06 KG/M2 | HEART RATE: 63 BPM | WEIGHT: 160 LBS | DIASTOLIC BLOOD PRESSURE: 76 MMHG | SYSTOLIC BLOOD PRESSURE: 120 MMHG

## 2018-03-08 DIAGNOSIS — M79.7 FIBROMYALGIA: ICD-10-CM

## 2018-03-08 DIAGNOSIS — F32.A DEPRESSION, UNSPECIFIED DEPRESSION TYPE: ICD-10-CM

## 2018-03-08 RX ORDER — DULOXETIN HYDROCHLORIDE 20 MG/1
20 CAPSULE, DELAYED RELEASE ORAL DAILY
Qty: 30 CAP | Refills: 2 | Status: SHIPPED | OUTPATIENT
Start: 2018-03-08 | End: 2018-05-01 | Stop reason: SDUPTHER

## 2018-03-08 RX ORDER — METHYLPREDNISOLONE 4 MG/1
TABLET ORAL
Qty: 1 DOSE PACK | Refills: 0 | Status: SHIPPED | OUTPATIENT
Start: 2018-03-08 | End: 2018-03-16 | Stop reason: ALTCHOICE

## 2018-03-08 RX ORDER — SUMATRIPTAN 50 MG/1
50 TABLET, FILM COATED ORAL
Qty: 9 TAB | Refills: 2 | Status: SHIPPED | OUTPATIENT
Start: 2018-03-08 | End: 2018-08-07 | Stop reason: SDUPTHER

## 2018-03-08 NOTE — PROGRESS NOTES
Neurology Clinic Follow up Note    Patient ID:  Rojas High  [id number]  54 y.o.  1960      Ms. Inderjit Bates is here for follow up today of migraines. Last Appointment With Me:  12/5/2017       Interval History:   Headaches were better initially after last visit. She went home to [facility] and contracted a virus with sore throat, body aches lasting 2 weeks. Headaches increased after this time. Current headache frequency is daily over the past several weeks. She did seek ED care while in [facility] due to her headaches. Headaches are located over the L hemisphere with nausea, +photophobia lasting several hours. No focal weakness, paresthesias, vision loss. She was taking some unspecified abortive medication while in [facility] as well as tylenol/ibuprofen. Since arriving back to the [facility] this past week, she has been using Excedrin and/or Imitrex which was somewhat helpful. She remains on TPM 100mg BID and VPA 500mg BID. No reported side effects. PMHx/ PSHx/ FHx/ SHx:  Reviewed and unchanged previous visit. Past Medical History:   Diagnosis Date    Anal fistula     Asthma     Back ache     CAD (coronary artery disease)     leaky valves    Cholesterol blood decreased     Dementia     Depression     Deviated septum     Diabetes (HCC)     pre diabetese    Fibromyalgia     Frequent headaches     Heart valve disease     Herniated nucleus pulposus, L5-S1     Migraine     Protein S deficiency (HCC)     Pulmonary emboli (HCC)     Pulmonary hypertension     Restless leg syndrome     RLS (restless legs syndrome)     Sickle cell trait (HCC)     Sleep apnea     Stroke (HCC)     Upper airway resistance syndrome     Vaginal fistula          ROS:  Comprehensive review of systems negative except for as noted above.        Objective:       Meds:  Current Outpatient Prescriptions   Medication Sig Dispense Refill    gabapentin (NEURONTIN) 400 mg capsule take 1 capsule by mouth three times a day 270 Cap 1    topiramate (TOPAMAX) 100 mg tablet Take 1 Tab by mouth two (2) times a day. 60 Tab 3    divalproex DR (DEPAKOTE) 500 mg tablet Take 1 Tab by mouth two (2) times a day. 60 Tab 3    montelukast (SINGULAIR) 10 mg tablet take 1 tablet by mouth once daily 30 Tab 11    simvastatin (ZOCOR) 20 mg tablet Take 1 Tab by mouth nightly. 90 Tab 1    aspirin 81 mg chewable tablet Take 81 mg by mouth daily.  Cetirizine 10 mg cap Take  by mouth.  albuterol (PROVENTIL VENTOLIN) 2.5 mg /3 mL (0.083 %) nebulizer solution by Nebulization route once. Exam:  Visit Vitals    /76    Pulse 63    Resp 18    Wt 72.6 kg (160 lb)    SpO2 99%    BMI 25.06 kg/m2     NEUROLOGICAL EXAM:  General: Awake, alert, speech fluent. Tearful due to current migraine. CN: PERRL, EOMI without nystagmus, VFF to confrontation, facial sensation and strength are normal and symmetric, hearing is intact to finger rub bilaterally, palate and tongue movements are intact and symmetric. Motor: Normal tone, bulk and strength bilaterally. Reflexes: 2/4 and symmetric, plantar stimulation is flexor. Coordination: FNF, MAMADOU, HTS intact. Sensation: LT intact throughout. Gait: Normal-based and steady.       LABS  Results for orders placed or performed in visit on 12/11/17   HEPATITIS C AB   Result Value Ref Range    Hep C Virus Ab <0.1 0.0 - 0.9 s/co ratio   HEMOGLOBIN A1C WITH EAG   Result Value Ref Range    Hemoglobin A1c 5.5 4.8 - 5.6 %    Estimated average glucose 248 mg/dL   METABOLIC PANEL, COMPREHENSIVE   Result Value Ref Range    Glucose 78 65 - 99 mg/dL    BUN 19 6 - 24 mg/dL    Creatinine 0.80 0.57 - 1.00 mg/dL    GFR est non-AA 83 >59 mL/min/1.73    GFR est AA 96 >59 mL/min/1.73    BUN/Creatinine ratio 24 (H) 9 - 23    Sodium 144 134 - 144 mmol/L    Potassium 4.4 3.5 - 5.2 mmol/L    Chloride 105 96 - 106 mmol/L    CO2 24 18 - 29 mmol/L    Calcium 9.1 8.7 - 10.2 mg/dL    Protein, total 6.8 6.0 - 8.5 g/dL    Albumin 4.3 3.5 - 5.5 g/dL    GLOBULIN, TOTAL 2.5 1.5 - 4.5 g/dL    A-G Ratio 1.7 1.2 - 2.2    Bilirubin, total <0.2 0.0 - 1.2 mg/dL    Alk. phosphatase 76 39 - 117 IU/L    AST (SGOT) 13 0 - 40 IU/L    ALT (SGPT) 15 0 - 32 IU/L       IMAGING:  MRI Results (most recent):  No results found for this or any previous visit. Assessment:     Encounter Diagnoses     ICD-10-CM ICD-9-CM   1. Chronic migraine G43.709 346.70   2. Depression, unspecified depression type F32.9 311   3. Fibromyalgia M79.7 67.1      56year old AAF with a h/o depression, fibromyalgia here for f/u of chronic migraines since age 17, L hemispheric refractory to multiple preventative medications. She has failed TCA therapy in the past due to side effects. She is presently on TPM and VPA for migraine ppx which appeared to be effective initially, however she has experienced increasing frequency of migraines over the past several weeks after aminta a viral illness in [facility]. She has been using abortive therapy daily for several weeks likely causing some degree of rebound headache as well. Imitrex/Maxalt/Sprix have not been extremely effective for her migraines in the past.    Discussed a trial of Cymbalta for migraine ppx. Will also prescribe a short steroid taper to assist with breaking her current migraine cycle. Headache education  Discussed treatment options, both abortive and preventive medications. Instructed patient about medications and potential side effects. Discussed medication overuse headache and to limit use of analgesics to less than 2 doses per week. Plan:   Start Cymbalta 20mg for HA ppx. Cont. TPM 100mg BID and VPA 500mg BID. Medrol dose pack   Imitrex 50mg PRN for abortive HA tx, limit use to no more than twice weekly  Sample of Zembrace for abortive HA tx provided in office today. Follow-up Disposition:  Return in about 6 weeks (around 4/19/2018).       Signed:  Mickie Barnett DO  3/8/2018  1:44 PM

## 2018-03-08 NOTE — TELEPHONE ENCOUNTER
Spoke with patient, informed her to please call back after she has tried the Imitrex medication so we can fill a Zembrace order if the Imitrex fails per .

## 2018-03-08 NOTE — PATIENT INSTRUCTIONS

## 2018-03-08 NOTE — MR AVS SNAPSHOT
[place] [address] [address] [place] [address] 
[phone] Patient: Juana Hodges MRN: [id number] :1960 Visit Information Date & Time Provider Department Dept. Phone Encounter #  
 3/8/2018  9:40 AM Sal Timmons DO [facility] at [address] [id number] Follow-up Instructions Return in about 6 weeks (around 2018). Your Appointments 3/12/2018  1:00 PM  
ROUTINE CARE with Jose Fung MD  
[address] [facility]) Appt Note: 3 mo f/u  
 [address] [id number]  
[place] [id number]  
  
    
 3/13/2018  9:00 AM  
New Patient with Regis Hall MD  
[address] ([facility]) Appt Note: NP est. Pain; same  
 [address] [facility] [address]  
[phone]  
  
   
 [address] [facility] 7 [phone] Upcoming Health Maintenance Date Due  
 BREAST CANCER SCRN MAMMOGRAM 2019 PAP AKA CERVICAL CYTOLOGY 2019 COLONOSCOPY 3/22/2026 DTaP/Tdap/Td series (2 - Td) 2026 Allergies as of 3/8/2018  Review Complete On: 3/8/2018 By: Sal Timmons DO Severity Noted Reaction Type Reactions Ambien [Zolpidem]  2016    Unknown (comments) Causes Migraine Cephalexin  2016    Rash Morphine  2015    Rash Motrin [Ibuprofen]  2015    Swelling Nortriptyline  2016    Unknown (comments) Bleeding from mouth Pepcid [Famotidine]  2017    Other (comments) Migraines. Protonix [Pantoprazole]  2016    Rash Vicodin [Hydrocodone-acetaminophen]  2016    Unknown (comments) Causes Migraines Vitamins For Infusion  2016    Unknown (comments) Pt stated all vitamins cause her lightheadness and sick Current Immunizations  Reviewed on 6/15/2017 Name Date Influenza High Dose Vaccine PF 8/15/2016 Influenza Vaccine 9/1/2015 Influenza Vaccine (Quad) PF 8/1/2017 Pneumococcal Vaccine (Unspecified Type) 9/1/2015 Tdap 12/13/2016 Zoster Vaccine, Live 6/13/2017 Not reviewed this visit Vitals BP Pulse Resp Weight(growth percentile) SpO2 BMI  
 120/76 63 18 160 lb (72.6 kg) 99% 25.06 kg/m2 OB Status Smoking Status Hysterectomy Never Smoker Vitals History BMI and BSA Data Body Mass Index Body Surface Area 25.06 kg/m 2 1.85 m 2 Preferred Pharmacy Pharmacy Name Phone [facility] [address] Harsha Passer [phone] Your Updated Medication List  
  
   
This list is accurate as of 3/8/18  9:55 AM.  Always use your most recent med list.  
  
  
  
  
 albuterol 2.5 mg /3 mL (0.083 %) nebulizer solution Commonly known as:  PROVENTIL VENTOLIN  
by Nebulization route once. aspirin 81 mg chewable tablet Take 81 mg by mouth daily. Cetirizine 10 mg Cap Take  by mouth. divalproex  mg tablet Commonly known as:  DEPAKOTE Take 1 Tab by mouth two (2) times a day. DULoxetine 20 mg capsule Commonly known as:  CYMBALTA Take 1 Cap by mouth daily. gabapentin 400 mg capsule Commonly known as:  NEURONTIN  
take 1 capsule by mouth three times a day  
  
 methylPREDNISolone 4 mg tablet Commonly known as:  Brenton Calamity Take as directed  
  
 montelukast 10 mg tablet Commonly known as:  SINGULAIR  
take 1 tablet by mouth once daily  
  
 simvastatin 20 mg tablet Commonly known as:  ZOCOR Take 1 Tab by mouth nightly. topiramate 100 mg tablet Commonly known as:  TOPAMAX Take 1 Tab by mouth two (2) times a day. Prescriptions Sent to Pharmacy Refills DULoxetine (CYMBALTA) 20 mg capsule 2 Sig: Take 1 Cap by mouth daily. Class: Normal  
 Pharmacy: [facility] [address] [place] Ph #: [phone] Route: Oral  
 methylPREDNISolone (MEDROL DOSEPACK) 4 mg tablet 0 Sig: Take as directed Class: Normal  
 Pharmacy: [facility] [address], [place] Ph #: [phone] Follow-up Instructions Return in about 6 weeks (around 4/19/2018). Patient Instructions A Healthy Lifestyle: Care Instructions Your Care Instructions A healthy lifestyle can help you feel good, stay at a healthy weight, and have plenty of energy for both work and play. A healthy lifestyle is something you can share with your whole family. A healthy lifestyle also can lower your risk for serious health problems, such as high blood pressure, heart disease, and diabetes. You can follow a few steps listed below to improve your health and the health of your family. Follow-up care is a key part of your treatment and safety. Be sure to make and go to all appointments, and call your doctor if you are having problems. It's also a good idea to know your test results and keep a list of the medicines you take. How can you care for yourself at home? · Do not eat too much sugar, fat, or fast foods. You can still have dessert and treats now and then. The goal is moderation. · Start small to improve your eating habits. Pay attention to portion sizes, drink less juice and soda pop, and eat more fruits and vegetables. ¨ Eat a healthy amount of food. A 3-ounce serving of meat, for example, is about the size of a deck of cards. Fill the rest of your plate with vegetables and whole grains. ¨ Limit the amount of soda and sports drinks you have every day. Drink more water when you are thirsty. ¨ Eat at least 5 servings of fruits and vegetables every day.  It may seem like a lot, but it is not hard to reach this goal. A serving or helping is 1 piece of fruit, 1 cup of vegetables, or 2 cups of leafy, raw vegetables. Have an apple or some carrot sticks as an afternoon snack instead of a candy bar. Try to have fruits and/or vegetables at every meal. 
· Make exercise part of your daily routine. You may want to start with simple activities, such as walking, bicycling, or slow swimming. Try to be active 30 to 60 minutes every day. You do not need to do all 30 to 60 minutes all at once. For example, you can exercise 3 times a day for 10 or 20 minutes. Moderate exercise is safe for most people, but it is always a good idea to talk to your doctor before starting an exercise program. 
· Keep moving. [place] the lawn, work in the garden, or [employer]. Take the stairs instead of the elevator at work. · If you smoke, quit. People who smoke have an increased risk for heart attack, stroke, cancer, and other lung illnesses. Quitting is hard, but there are ways to boost your chance of quitting tobacco for good. ¨ Use nicotine gum, patches, or lozenges. ¨ Ask your doctor about stop-smoking programs and medicines. ¨ Keep trying. In addition to reducing your risk of diseases in the future, you will notice some benefits soon after you stop using tobacco. If you have shortness of breath or asthma symptoms, they will likely get better within a few weeks after you quit. · Limit how much alcohol you drink. Moderate amounts of alcohol (up to 2 drinks a day for men, 1 drink a day for women) are okay. But drinking too much can lead to liver problems, high blood pressure, and other health problems. Family health If you have a family, there are many things you can do together to improve your health. · Eat meals together as a family as often as possible. · Eat healthy foods. This includes fruits, vegetables, lean meats and dairy, and whole grains. · Include your family in your fitness plan. Most people think of activities such as jogging or tennis as the way to fitness, but there are many ways you and your family can be more active. Anything that makes you breathe hard and gets your heart pumping is exercise. Here are some tips: 
¨ Walk to do errands or to take your child to school or the bus. ¨ Go for a family bike ride after dinner instead of watching TV. Where can you learn more? Go to http://shell-td.info/. Enter G685 in the search box to learn more about \"A Healthy Lifestyle: Care Instructions. \" Current as of: May 12, 2017 Content Version: 11.4 © 4617-3617 [employer]My[employer]. Care instructions adapted under license by [employer] (which disclaims liability or warranty for this information). If you have questions about a medical condition or this instruction, always ask your healthcare professional. [address] any warranty or liability for your use of this information. Introducing [facility]! [facility] introduces VisitorsCafe patient portal. Now you can access parts of your medical record, email your doctor's office, and request medication refills online. 1. In your internet browser, go to [email or web address] 2. Click on the First Time User? Click Here link in the Sign In box. You will see the New Member Sign Up page. 3. Enter your VisitorsCafe Access Code exactly as it appears below. You will not need to use this code after youve completed the sign-up process. If you do not sign up before the expiration date, you must request a new code. · VisitorsCafe Access Code: [email or web address] Expires: 6/6/2018  9:30 AM 
 
4. Enter the last four digits of your Social Security Number (xxxx) and Date of Birth (mm/dd/yyyy) as indicated and click Submit. You will be taken to the next sign-up page. 5. Create a WangYou ID. This will be your WangYou login ID and cannot be changed, so think of one that is secure and easy to remember. 6. Create a WangYou password. You can change your password at any time. 7. Enter your Password Reset Question and Answer. This can be used at a later time if you forget your password. 8. Enter your e-mail address. You will receive e-mail notification when new information is available in [address] 9. Click Sign Up. You can now view and download portions of your medical record. 10. Click the Download Summary menu link to download a portable copy of your medical information. If you have questions, please visit the Frequently Asked Questions section of the WangYou website. Remember, WangYou is NOT to be used for urgent needs. For medical emergencies, dial 911. Now available from your iPhone and Android! Please provide this summary of care documentation to your next provider. Your [facility] care [facility]. If you have any questions after today's visit, please call [phone].

## 2018-03-13 ENCOUNTER — OFFICE VISIT (OUTPATIENT)
Dept: RHEUMATOLOGY | Age: 58
End: 2018-03-13

## 2018-03-13 VITALS
HEIGHT: 67 IN | BODY MASS INDEX: 25.58 KG/M2 | WEIGHT: 163 LBS | HEART RATE: 79 BPM | DIASTOLIC BLOOD PRESSURE: 74 MMHG | SYSTOLIC BLOOD PRESSURE: 148 MMHG | TEMPERATURE: 99.1 F | RESPIRATION RATE: 18 BRPM

## 2018-03-13 DIAGNOSIS — M79.7 FIBROMYALGIA: Primary | ICD-10-CM

## 2018-03-13 DIAGNOSIS — G56.03 BILATERAL CARPAL TUNNEL SYNDROME: ICD-10-CM

## 2018-03-13 RX ORDER — MEMANTINE HYDROCHLORIDE 28 MG/1
CAPSULE, EXTENDED RELEASE ORAL DAILY
COMMUNITY
End: 2018-10-09 | Stop reason: SDUPTHER

## 2018-03-13 NOTE — PROGRESS NOTES
REASON FOR VISIT    This is an evaluation for Ms. Rj Goodrich a 56 y.o.  female for diffuse pain. The patient is referred to the [facility] and [address] [place] [address] at the request of Dr. Vicki Carreon. HISTORY OF PRESENT ILLNESS     I have reviewed and summarized old records from [facility]    She has a history of fibromyalgia. Today, she complains of numbness and tingling in her hands and feet when she lays on them. She also has constant diffuse dull aching body pain associated with body stiffness if she sits for a prolonged period. Tylenol helps. She is on Neurotin which helps. In 9/13/2017, labs showed negative TERENCE direct, rheumatoid factor. She is on Medrol for migraines which has not helped with her diffuse pain. REVIEW OF SYSTEMS    A 15 point review of systems was performed and summarized below. The questionnaire was reviewed with the patient and scanned into the patient's medical record.     General: denies recent weight gain, recent weight loss, fatigue, weakness, fever, night sweats  Musculoskeletal: endorses joint pain, morning stiffness (lasting few minutes), denies joint swelling, muscle weakness  Ears: denies ringing in ears, loss of hearing, deafness  Eyes: endorses foreign body sensation, denies pain, redness, loss of vision, double vision, blurred vision, dryness  Mouth: endorses dryness, denies sore tongue, oral ulcers, bleeding gums, loss of taste, increased dental caries  Nose: denies nosebleeds, loss of smell, nasal ulcers  Throat: endorses frequent sore throats, denies hoarseness, difficulty in swallowing, pain in jaw while chewing  Neck: denies swollen glands, tender glands  Cardiopulmonary: endorses cough, denies pain in chest, irregular heart beat, sudden changes in heart beat, shortness of breath, difficulty breathing at night, swollen legs or feet, coughing of blood, wheezing  Gastrointestinal: endorses heartburn, denies nausea, stomach pain relieved by food, vomiting of blood/\"coffee grounds\", jaundice, increasing constipation, persistent diarrhea, blood in stools, black stools  Genitourinary: denies nocturia, difficult urination, pain or burning on urination, blood in urine, cloudy urine, pus in urine, genital discharge, frequent urination, vaginal dryness, rash/ulcers, sexual difficulties   Hematologic: denies anemia, bleeding tendency, blood clots  Skin: denies easy bruising, redness, rash, hives, sun sensitive, skin tightness, nodules/bumps, hair loss, color changes of hands or feet in the cold (Raynaud's)  Neurologic: endorses headaches, numbness or tingling in hands/feet, memory loss, denies dizziness, muscle weakness, fainting or loss of consciousness  Psychiatric: endorses depression, denies excessive worries  Sleep: endorses poor sleep, difficulty falling asleep, difficulty staying asleep, denies snoring, apnea, daytime somnolence    PAST MEDICAL HISTORY    She has a past medical history of Anal fistula; Asthma; Back ache; CAD (coronary artery disease); Cholesterol blood decreased; Dementia; Depression; Deviated septum; Diabetes ([address] Fibromyalgia; Frequent headaches; Heart valve disease; Herniated nucleus pulposus, L5-S1; Migraine; Protein S deficiency ([address] Pulmonary emboli ([address] Pulmonary hypertension; Restless leg syndrome; RLS (restless legs syndrome); Sickle cell trait ([facility] [address] [facility] Sleep apnea; Stroke [facility]); Upper airway resistance syndrome; and Vaginal fistula. She also has no past medical history of Chronic obstructive pulmonary disease ([address] FAMILY HISTORY    Her family history includes Diabetes in her mother; Heart Disease in her mother; Hypertension in her brother and mother; Sickle Cell Anemia in her sister; Stroke in her maternal aunt. SOCIAL HISTORY    She reports that she has never smoked. She has never used smokeless tobacco. She reports that she does not drink alcohol or use illicit drugs.     GYNECOLOGIC HISTORY     3, Para 3, Living 3, Miscarriage 0    She denies severe pre-eclampsia, eclampsia or placental insufficiency    HEALTH MAINTENANCE    Immunizations  Immunization History   Administered Date(s) Administered    Influenza High Dose Vaccine PF 08/15/2016    Influenza Vaccine 09/01/2015    Influenza Vaccine (Quad) PF 08/01/2017    Pneumococcal Vaccine (Unspecified Type) 09/01/2015    Tdap 12/13/2016    Zoster Vaccine, Live 06/13/2017     MEDICATIONS    Current Outpatient Prescriptions   Medication Sig Dispense Refill    memantine ER (NAMENDA XR) 28 mg capsule Take  by mouth daily.  DULoxetine (CYMBALTA) 20 mg capsule Take 1 Cap by mouth daily. 30 Cap 2    methylPREDNISolone (MEDROL DOSEPACK) 4 mg tablet Take as directed 1 Dose Pack 0    SUMAtriptan (IMITREX) 50 mg tablet Take 1 Tab by mouth once as needed for Migraine for up to 1 dose. 9 Tab 2    gabapentin (NEURONTIN) 400 mg capsule take 1 capsule by mouth three times a day 270 Cap 1    topiramate (TOPAMAX) 100 mg tablet Take 1 Tab by mouth two (2) times a day. 60 Tab 3    divalproex DR (DEPAKOTE) 500 mg tablet Take 1 Tab by mouth two (2) times a day. 60 Tab 3    montelukast (SINGULAIR) 10 mg tablet take 1 tablet by mouth once daily 30 Tab 11    simvastatin (ZOCOR) 20 mg tablet Take 1 Tab by mouth nightly. 90 Tab 1    aspirin 81 mg chewable tablet Take 81 mg by mouth daily.  Cetirizine 10 mg cap Take  by mouth.  albuterol (PROVENTIL VENTOLIN) 2.5 mg /3 mL (0.083 %) nebulizer solution by Nebulization route once. ALLERGIES    Allergies   Allergen Reactions    Ambien [Zolpidem] Unknown (comments)     Causes Migraine    Cephalexin Rash    Morphine Rash    Motrin [Ibuprofen] Swelling    Nortriptyline Unknown (comments)     Bleeding from mouth    Pepcid [Famotidine] Other (comments)     Migraines.     Protonix [Pantoprazole] Rash    Vicodin [Hydrocodone-Acetaminophen] Unknown (comments)     Causes Migraines    Vitamins For Infusion Unknown (comments)     Pt stated all vitamins cause her lightheadness and sick       PHYSICAL EXAMINATION    Visit Vitals    /74    Pulse 79    Temp 99.1 °F (37.3 °C)    Resp 18    Ht 5' 7\" (1.702 m)    Wt 163 lb (73.9 kg)    BMI 25.53 kg/m2     Body mass index is 25.53 kg/(m^2). General: Patient is alert, oriented x 3, not in acute distress    HEENT:   Conjunctiva are not injected and appear moist, oral mucous membranes are moist, there are no ulcers present, there is no alopecia, neck is supple, there is no lymphadenopathy. Salivary glands are normal    Cardiovascular:  Heart is regular rate and rhythm, no murmurs. Chest:  Lungs are clear to auscultation bilaterally. Extremities:  Free of clubbing, cyanosis, edema, extremities well perfused. Neurological exam:  No focal sensory deficits, muscle strength is full in upper and lower extremities. Negative Tinel's    Skin exam:  There are no rashes, no psoriasis, no active Raynaud's, no livedo reticularis, no periungual erythema. Musculoskeletal exam:  A comprehensive musculoskeletal exam was performed for all joints of each upper and lower extremity and assessed for swelling, tenderness and range of motion. 15/18 tender points. No synovitis    DATA REVIEW    Studies Reviewed:     Prior medical records were reviewed and are summarized as below:    Laboratory data: summarized in the HPI    Imaging: summarized in the HPI. ASSESSMENT AND PLAN    1) Fibromyalgia. Her history, constellation of symptoms, and examination are consistent with a pain syndrome. Fibromyalgia is a disease characterized by chronic widespread musculoskeletal pain. Fibromyalgia is caused by abnormal processing of pain signals in the central nervous system, leading to exaggerated pain responses. Non-pharmacologic therapies such as cardiovascular exercise and Cognitive Behavioral Therapy have been shown to be of benefit ([address], Am J Med 2009).  Jonathan Chi in particular has proven efficacy in the treatment of fibromyalgia ANNA De La Cruz 2010). If pharmacotherapy is pursued, pregabalin (Lyrica), gabapentin (Neurontin), milnacipran (Ania Certain), and duloxetine (Cymbalta) are FDA approved medications for the treatment of fibromyalgia. Narcotics have not been proven to be efficacious in the treatment of fibromyalgia. In fact, narcotic use in this patient population has been observed to exacerbate depression, and may enhance the hyperalgesia which is characteristic of this condition (Mary Nilsa Rheum 2006). They also are at increased risk for opioid-induced hyperalgesia due predominantly to central sensitization Marcela Riddle al. Vivian Soun Clin Rheumatol. 2013 Mar;19(2):72-7). Specifically, a double-blind placebo-controlled trial by Payal High al published in 1995 demonstrated that intravenous morphine did not reduce pain in fibromyalgia patients. A study by James Miles al published in 2003 showed that fibromyalgia patients taking oral opiates did not experience improvement in their pain at four years of follow up, and also reported increased depression over the last two years of the study. There is subsequent concern that the prolonged use of narcotics to treat fibromyalgia may cause harm to these patients Holland Kathy, Pain 2005). Finally, opioid use in fibromyalgia had poorer symptoms and functional and occupational status compared to nonusers (Paras OCHOA et al. Pain Res Treat. 3592;9792:805313). We therefore recommend that narcotics be avoided in all patients with fibromyalgia. We recommend Jonathan Chi stretching exercises for at least 30 minutes per day. The [facility] [facility] has made a videotape of Jonathan Chi that She can borrow from [employer], purchase online or watch for free on AppTap. com Jonathan Chi for Arthritis. We discussed treating secondary causes, such as sleep apnea, poor sleep quality, depression, anxiety, weight loss, vitamin deficiencies, such as vitamin D, and pursuing aquatherapy. I encouraged her to do [address]. My recommendations were provided to her and she was informed to follow up with her primary care physician for further management of her fibromyalgia. 2) Bilateral Carpal Tunnel Syndrome. This is positional and not reproducible on exam. I recommended carpal tunnel syndrome splints. The patient voiced understanding of the aforementioned assessment and plan. Summary of plan was provided in the After Visit Summary patient instructions. I also provided education about MyChart setup and utility.     TODAY'S ORDERS    None    Future Appointments  Date Time Provider Department Center   5/1/2018 9:40 AM DO MARA Wise ASIA Frederick MD, [address]    Adult Rheumatology   Musculoskeletal Ultrasound Certified  [facility] [place] [facility] [place] [address]   Phone [phone]  Fax [phone]

## 2018-03-13 NOTE — MR AVS SNAPSHOT
[address] [facility] [address] [place] [address] 
[phone] Patient: Mari Goyal MRN: [id number] :1960 Visit Information Date & Time Provider Department Dept. Phone Encounter #  
 3/13/2018  9:00 AM Marquez Corey, [address] [facility] [id number] Your Appointments 2018  9:40 AM  
Follow Up with Berl Bristol, DO [facility] at [address] [facility] Appt Note: 6 week f/u headache  
 [address] [facility] [address] [phone]  
  
   
 [address]  [address] Upcoming Health Maintenance Date Due  
 MEDICARE YEARLY EXAM 2018 BREAST CANCER SCRN MAMMOGRAM 2019 PAP AKA CERVICAL CYTOLOGY 2019 COLONOSCOPY 3/22/2026 DTaP/Tdap/Td series (2 - Td) 2026 Allergies as of 3/13/2018  Review Complete On: 3/13/2018 By: Marquez Corey MD  
  
 Severity Noted Reaction Type Reactions Ambien [Zolpidem]  2016    Unknown (comments) Causes Migraine Cephalexin  2016    Rash Morphine  2015    Rash Motrin [Ibuprofen]  2015    Swelling Nortriptyline  2016    Unknown (comments) Bleeding from mouth Pepcid [Famotidine]  2017    Other (comments) Migraines. Protonix [Pantoprazole]  2016    Rash Vicodin [Hydrocodone-acetaminophen]  2016    Unknown (comments) Causes Migraines Vitamins For Infusion  2016    Unknown (comments) Pt stated all vitamins cause her lightheadness and sick Current Immunizations  Reviewed on 3/13/2018 Name Date Influenza High Dose Vaccine PF 8/15/2016 Influenza Vaccine 2015 Influenza Vaccine (Quad) PF 2017 Pneumococcal Vaccine (Unspecified Type) 2015 Tdap 2016 Zoster Vaccine, Live 2017 Reviewed by Roberta Tapia RN on 3/13/2018 at  8:43 AM  
You Were Diagnosed With   
  
 Codes Comments Fibromyalgia    -  Primary ICD-10-CM: M79.7 ICD-9-CM: 729.1 Bilateral carpal tunnel syndrome     ICD-10-CM: G56.03 
ICD-9-CM: 354.0 Vitals BP Pulse Temp Resp Height(growth percentile) Weight(growth percentile) 148/74 79 99.1 °F (37.3 °C) 18 5' 7\" (1.702 m) 163 lb (73.9 kg) BMI OB Status Smoking Status 25.53 kg/m2 Hysterectomy Never Smoker BMI and BSA Data Body Mass Index Body Surface Area 25.53 kg/m 2 1.87 m 2 Preferred Pharmacy Pharmacy Name Phone [facility] [address] [place] [phone] Your Updated Medication List  
  
   
This list is accurate as of 3/13/18  9:28 AM.  Always use your most recent med list.  
  
  
  
  
 albuterol 2.5 mg /3 mL (0.083 %) nebulizer solution Commonly known as:  PROVENTIL VENTOLIN  
by Nebulization route once. aspirin 81 mg chewable tablet Take 81 mg by mouth daily. Cetirizine 10 mg Cap Take  by mouth. divalproex  mg tablet Commonly known as:  DEPAKOTE Take 1 Tab by mouth two (2) times a day. DULoxetine 20 mg capsule Commonly known as:  CYMBALTA Take 1 Cap by mouth daily. gabapentin 400 mg capsule Commonly known as:  NEURONTIN  
take 1 capsule by mouth three times a day  
  
 methylPREDNISolone 4 mg tablet Commonly known as:  Chris Sitter Take as directed  
  
 montelukast 10 mg tablet Commonly known as:  SINGULAIR  
take 1 tablet by mouth once daily NAMENDA XR 28 mg capsule Generic drug:  memantine ER Take  by mouth daily. simvastatin 20 mg tablet Commonly known as:  ZOCOR Take 1 Tab by mouth nightly. SUMAtriptan 50 mg tablet Commonly known as:  IMITREX Take 1 Tab by mouth once as needed for Migraine for up to 1 dose. topiramate 100 mg tablet Commonly known as:  TOPAMAX Take 1 Tab by mouth two (2) times a day. Patient Instructions FIBROMYALGIA Fibromyalgia is a disease characterized by chronic widespread musculoskeletal pain. Fibromyalgia is caused by abnormal processing of pain signals in the central nervous system, leading to exaggerated pain responses. There are functional MRI studies that have shown neuroplasticity (re-wiring) of the pain pathways in the brain. Physical and Mental Exercise The mainstay of therapy includes non-pharmacologic therapies such as cardiovascular exercise and Cognitive Behavioral Therapy which have been shown to be of benefit ([facility], Am J Med 2009). Jonathan Chi in particular has proven efficacy in the treatment of fibromyalgia Sid Sorto al. [facility] J Med 2010; 168:049-102). Performing at least 60 minutes per day of Coye Silk has been shown to improve pain without medical management. Medications After discussing with your primary care and if medications are pursued, pregabalin (Lyrica), gabapentin (Neurontin), milnacipran (Thresa Ring), and duloxetine (Cymbalta) are FDA approved medications for the treatment of fibromyalgia. Narcotic Pain Medications Are BAD Narcotics, such as oxycodone, hydrocodone, morphine, dilaudid, or codeine, have not been proven to be efficacious in the treatment of fibromyalgia. In fact, narcotic use in this patient population has been observed to exacerbate depression, and may enhance the hyperalgesia which is characteristic of this condition (Ruthanna Erp Rheum 2006). They also are at increased risk for opioid-induced hyperalgesia due predominantly to central sensitization Lior Riddle al. [facility] Clin Rheumatol. 2013 Mar;19(2):72-7). Specifically, a double-blind placebo-controlled trial by Chrissy Rudolph al published in 1995 demonstrated that intravenous morphine did not reduce pain in fibromyalgia patients.  A study by Emily shelby published in 2003 showed that fibromyalgia patients taking oral opiates did not experience improvement in their pain at four years of follow up, and also reported increased depression over the last two years of the study. There is subsequent concern that the prolonged use of narcotics to treat fibromyalgia may cause harm to these patients Annel Johnson, Pain 2005). Finally, opioid use in fibromyalgia had poorer symptoms and functional and occupational status compared to nonusers (Paras OCHOA et al. Pain Res Treat. 1424;8710:075716). Therefore, I recommend that narcotics be avoided in all patients with fibromyalgia. My Recommendations I recommend Jonathan Chi stretching exercises for at least 30 minutes per day. The [employer] has made a videotape of Jonathan Chi that you can borrow from [employer], purchase online or watch for free on [email or web address]. com Jonathan Chi for Arthritis. I strongly recommend you to join a gym that has an indoor pool, to do aqua therapy and Jonathan Chi classes. Resources include: [employer], [employer], and the [employer]. We discussed treating secondary causes, such as sleep apnea, poor sleep quality, depression, anxiety weight loss, vitamin deficiencies, such as vitamin D, and pursuing aquatherapy. I encourage you to do Ysitie 71. Introducing [facility]! Norm Martinez introduces [facility] patient portal. Now you can access parts of your medical record, email your doctor's office, and request medication refills online. 1. In your internet browser, go to [email or web address]://M Lite Solution. [employer]/M Lite Solution 2. Click on the First Time User? Click Here link in the Sign In box. You will see the New Member Sign Up page. 3. Enter your [facility] Access Code exactly as it appears below. You will not need to use this code after youve completed the sign-up process. If you do not sign up before the expiration date, you must request a new code. · [facility] Access Code: [email or web address] Expires: 6/6/2018 10:30 AM 
 
 4. Enter the last four digits of your Social Security Number (xxxx) and Date of Birth (mm/dd/yyyy) as indicated and click Submit. You will be taken to the next sign-up page. 5. Create a Sentisis ID. This will be your Sentisis login ID and cannot be changed, so think of one that is secure and easy to remember. 6. Create a Sentisis password. You can change your password at any time. 7. Enter your Password Reset Question and Answer. This can be used at a later time if you forget your password. 8. Enter your e-mail address. You will receive e-mail notification when new information is available in [address] 9. Click Sign Up. You can now view and download portions of your medical record. 10. Click the Download Summary menu link to download a portable copy of your medical information. If you have questions, please visit the Frequently Asked Questions section of the Sentisis website. Remember, Sentisis is NOT to be used for urgent needs. For medical emergencies, dial 911. Now available from your iPhone and Android! Please provide this summary of care documentation to your next provider. Your primary care clinician is listed as Sera Alanis. If you have any questions after today's visit, please call [phone].

## 2018-03-13 NOTE — PATIENT INSTRUCTIONS
FIBROMYALGIA    Fibromyalgia is a disease characterized by chronic widespread musculoskeletal pain. Fibromyalgia is caused by abnormal processing of pain signals in the central nervous system, leading to exaggerated pain responses. There are functional MRI studies that have shown neuroplasticity (re-wiring) of the pain pathways in the brain. Physical and Mental Exercise    The mainstay of therapy includes non-pharmacologic therapies such as cardiovascular exercise and Cognitive Behavioral Therapy which have been shown to be of benefit ([address] [facility], Am J Med 2009). Jonathan Chi in particular has proven efficacy in the treatment of fibromyalgia Creta Hodgkins et al. [facility] J Med 2010; 402:865-897). Performing at least 60 minutes per day of Morgan Sergeant has been shown to improve pain without medical management. Medications    After discussing with your primary care and if medications are pursued, pregabalin (Lyrica), gabapentin (Neurontin), milnacipran ([place] Gabrielle), and duloxetine (Cymbalta) are FDA approved medications for the treatment of fibromyalgia. Narcotic Pain Medications Are BAD    Narcotics, such as oxycodone, hydrocodone, morphine, dilaudid, or codeine, have not been proven to be efficacious in the treatment of fibromyalgia. In fact, narcotic use in this patient population has been observed to exacerbate depression, and may enhance the hyperalgesia which is characteristic of this condition (Sharlotte Passy Rheum 2006). They also are at increased risk for opioid-induced hyperalgesia due predominantly to central sensitization Veronica Riddle al. [facility] Clin Rheumatol. 2013 Mar;19(2):72-7). Specifically, a double-blind placebo-controlled trial by Amelie Diaz al published in 1995 demonstrated that intravenous morphine did not reduce pain in fibromyalgia patients.  A study by Oscar Harris al published in 2003 showed that fibromyalgia patients taking oral opiates did not experience improvement in their pain at four years of follow up, and also reported increased depression over the last two years of the study. There is subsequent concern that the prolonged use of narcotics to treat fibromyalgia may cause harm to these patients Josi Guillory, Pain 2005). Finally, opioid use in fibromyalgia had poorer symptoms and functional and occupational status compared to nonusers (Paras OCHOA et al. Pain Res Treat. 3280;9865:959907). Therefore, I recommend that narcotics be avoided in all patients with fibromyalgia. My Recommendations    I recommend Jonathan Chi stretching exercises for at least 30 minutes per day. The [employer] has made a videotape of Jonathan Chi that you can borrow from [employer], purchase online or watch for free on [employer] [place]. com Jonathan Chi for Arthritis. I strongly recommend you to join a gym that has an indoor pool, to do aqua therapy and Jonathan Chi classes. Resources include: Jose, [employer], [employer], and the [facility]. We discussed treating secondary causes, such as sleep apnea, poor sleep quality, depression, anxiety weight loss, vitamin deficiencies, such as vitamin D, and pursuing aquatherapy.  I encourage you to do Ysitie 71.

## 2018-03-16 ENCOUNTER — OFFICE VISIT (OUTPATIENT)
Dept: FAMILY MEDICINE CLINIC | Age: 58
End: 2018-03-16

## 2018-03-16 VITALS
OXYGEN SATURATION: 98 % | SYSTOLIC BLOOD PRESSURE: 136 MMHG | TEMPERATURE: 98.2 F | RESPIRATION RATE: 18 BRPM | HEIGHT: 67 IN | DIASTOLIC BLOOD PRESSURE: 75 MMHG | WEIGHT: 163.9 LBS | HEART RATE: 62 BPM | BODY MASS INDEX: 25.72 KG/M2

## 2018-03-16 DIAGNOSIS — J45.21 MILD INTERMITTENT ASTHMA WITH EXACERBATION: ICD-10-CM

## 2018-03-16 DIAGNOSIS — E78.00 HYPERCHOLESTEREMIA: ICD-10-CM

## 2018-03-16 DIAGNOSIS — J01.00 ACUTE NON-RECURRENT MAXILLARY SINUSITIS: Primary | ICD-10-CM

## 2018-03-16 DIAGNOSIS — R68.83 CHILLS: ICD-10-CM

## 2018-03-16 DIAGNOSIS — J02.9 SORE THROAT: ICD-10-CM

## 2018-03-16 LAB
QUICKVUE INFLUENZA TEST: NEGATIVE
S PYO AG THROAT QL: NEGATIVE
VALID INTERNAL CONTROL?: YES
VALID INTERNAL CONTROL?: YES

## 2018-03-16 RX ORDER — SIMVASTATIN 20 MG/1
20 TABLET, FILM COATED ORAL
Qty: 90 TAB | Refills: 1 | Status: CANCELLED | OUTPATIENT
Start: 2018-03-16

## 2018-03-16 RX ORDER — ALBUTEROL SULFATE 90 UG/1
2 AEROSOL, METERED RESPIRATORY (INHALATION)
Qty: 1 INHALER | Refills: 3 | Status: SHIPPED | OUTPATIENT
Start: 2018-03-16 | End: 2018-10-09 | Stop reason: SDUPTHER

## 2018-03-16 RX ORDER — SIMVASTATIN 20 MG/1
20 TABLET, FILM COATED ORAL
Qty: 90 TAB | Refills: 1 | Status: SHIPPED | OUTPATIENT
Start: 2018-03-16 | End: 2018-10-09 | Stop reason: SDUPTHER

## 2018-03-16 RX ORDER — PREDNISONE 20 MG/1
40 TABLET ORAL
Qty: 8 TAB | Refills: 0 | Status: SHIPPED | OUTPATIENT
Start: 2018-03-16 | End: 2018-03-20

## 2018-03-16 RX ORDER — AMOXICILLIN AND CLAVULANATE POTASSIUM 875; 125 MG/1; MG/1
1 TABLET, FILM COATED ORAL 2 TIMES DAILY
Qty: 20 TAB | Refills: 0 | Status: SHIPPED | OUTPATIENT
Start: 2018-03-16 | End: 2018-03-26

## 2018-03-16 NOTE — PATIENT INSTRUCTIONS
Controlling Your Asthma: Care Instructions  Your Care Instructions    Asthma is a long-term condition that affects your breathing. It causes the airways that lead to the lungs to swell. During an asthma attack, the airways swell and narrow. This makes it hard to breathe. You may wheeze or cough. If you have a bad attack, you may need emergency care. There are two things to do to treat asthma. · Control asthma over the long term. · Treat attacks when they occur. You and your doctor can make an asthma action plan. It tells you what medicines you need to take every day to control asthma symptoms and what to do if you have an asthma attack. Your asthma action plan can help prevent and treat attacks. When you keep your asthma under control, you can prevent severe attacks and lasting damage to your airways. You need to treat your asthma even when you are not having symptoms. Although asthma is a lifelong disease, treatment can help control it and help you stay healthy. Follow-up care is a key part of your treatment and safety. Be sure to make and go to all appointments, and call your doctor if you are having problems. It's also a good idea to know your test results and keep a list of the medicines you take. How can you care for yourself at home? To control asthma over the long term  Medicines  Controller medicines reduce swelling in your lungs. They also prevent asthma attacks. Take your controller medicine exactly as prescribed. Talk to your doctor if you have any problems with your medicine. · Inhaled corticosteroid is a common and effective controller medicine. Using it the right way can prevent or reduce most side effects. · Take your controller medicine every day, not just when you have symptoms. It helps prevent problems before they occur. · Your doctor may prescribe another medicine that you use along with the corticosteroid. This is often a long-acting bronchodilator.  Do not take this medicine by itself. Using a long-acting bronchodilator by itself can increase your risk of a severe or fatal asthma attack. · Do not take inhaled corticosteroids or long-acting bronchodilators to stop an asthma attack that has already started. They don't work fast enough to help. · Talk to your doctor before you use other medicines. Some medicines, such as aspirin, can cause asthma attacks in some people. Education  · Learn what triggers an asthma attack. Avoid these triggers when you can. Common triggers include colds, smoke, air pollution, dust, pollen, mold, pets, cockroaches, stress, and cold air. · Check yourself for asthma symptoms to know which step to follow in your action plan. Watch for things like being short of breath, having chest tightness, coughing, and wheezing. Also notice if symptoms wake you up at night or if you get tired quickly when you exercise. · If you have a peak flow meter, use it to check how well you are breathing. It can help you know when an asthma attack is going to occur. Then you can take medicine to prevent the asthma attack or make it less severe. · Do not smoke or allow others to smoke around you. Avoid smoky places. Smoking makes asthma worse. If you need help quitting, talk to your doctor about stop-smoking programs and medicines. These can increase your chances of quitting for good. · Avoid colds and the flu. Get a pneumococcal vaccine shot. If you have had one before, ask your doctor whether you need a second dose. Get a flu vaccine every year, as soon as it's available. If you must be around people with colds or the flu, wash your hands often. To treat attacks when they occur  Use your asthma action plan when you have an attack. Your quick-relief medicine will stop an asthma attack. It relaxes the muscles that get tight around the airways. If your doctor prescribed corticosteroid pills to use during an attack, take them as directed.  They may take hours to work, but they may shorten the attack and help you breathe better. · Albuterol is an effective quick-relief inhaler. · Take your quick-relief medicine exactly as prescribed. · Always bring your asthma medicine with you when you travel. · You may need to use quick-relief medicine before you exercise. · Call your doctor if you think you are having a problem with your medicine. When should you call for help? Call 911 anytime you think you may need emergency care. For example, call if:  ? · You are having severe trouble breathing. ?Call your doctor now or seek immediate medical care if:  ? · Your symptoms do not get better after you have followed your asthma action plan. ? · You cough up yellow, dark brown, or bloody mucus (sputum). ? Watch closely for changes in your health, and be sure to contact your doctor if:  ? · Your coughing and wheezing get worse. ? · You need to use your quick-relief medicine on more than 2 days a week (unless it is just for exercise). ? · You need help figuring out what is triggering your asthma attacks. Where can you learn more? Go to http://shell-td.info/. Enter B804 in the search box to learn more about \"Controlling Your Asthma: Care Instructions. \"  Current as of: May 12, 2017  Content Version: 11.4  © 4229-1934 Healthwise, Incorporated. Care instructions adapted under license by [employer] (which disclaims liability or warranty for this information). If you have questions about a medical condition or this instruction, always ask your healthcare professional. [place] [address] any warranty or liability for your use of this information.

## 2018-03-16 NOTE — MR AVS SNAPSHOT
[address] [facility] [address] 
[phone] Patient: Juan Dupree MRN: [id number] :1960 Visit Information Date & Time Provider Department Dept. Phone Encounter #  
 3/16/2018  2:45 PM Rey Hilliard, [address] [facility] [phone] [id number] Follow-up Instructions Return if symptoms worsen or fail to improve. Your Appointments 2018  9:40 AM  
Follow Up with Lazaro Pinto DO [facility] at [facility] [address] [place] [address]) Appt Note: 6 week f/u headache  
 [address] [facility] [address]  
[phone]  
  
   
 [address] [address] Upcoming Health Maintenance Date Due  
 MEDICARE YEARLY EXAM 2018 BREAST CANCER SCRN MAMMOGRAM 2019 PAP AKA CERVICAL CYTOLOGY 2019 COLONOSCOPY 3/22/2026 DTaP/Tdap/Td series (2 - Td) 2026 Allergies as of 3/16/2018  Review Complete On: 3/16/2018 By: Azul Lima LPN Severity Noted Reaction Type Reactions Ambien [Zolpidem]  2016    Unknown (comments) Causes Migraine Cephalexin  2016    Rash Morphine  2015    Rash Motrin [Ibuprofen]  2015    Swelling Nortriptyline  2016    Unknown (comments) Bleeding from mouth Pepcid [Famotidine]  2017    Other (comments) Migraines. Protonix [Pantoprazole]  2016    Rash Vicodin [Hydrocodone-acetaminophen]  2016    Unknown (comments) Causes Migraines Vitamins For Infusion  2016    Unknown (comments) Pt stated all vitamins cause her lightheadness and sick Current Immunizations  Reviewed on 3/13/2018 Name Date Influenza High Dose Vaccine PF 8/15/2016 Influenza Vaccine 2015 Influenza Vaccine (Quad) PF 2017 Pneumococcal Vaccine (Unspecified Type) 9/1/2015 Tdap 12/13/2016 Zoster Vaccine, Live 6/13/2017 Not reviewed this visit You Were Diagnosed With   
  
 Codes Comments Acute non-recurrent maxillary sinusitis    -  Primary ICD-10-CM: J01.00 ICD-9-CM: 461.0 Mild intermittent asthma with exacerbation     ICD-10-CM: J45.21 ICD-9-CM: 929.51 Hypercholesteremia     ICD-10-CM: E78.00 ICD-9-CM: 272.0 Vitals BP Pulse Temp Resp Height(growth percentile) Weight(growth percentile) 136/75 62 98.2 °F (36.8 °C) (Oral) 18 5' 7\" (1.702 m) 163 lb 14.4 oz (74.3 kg) SpO2 BMI OB Status Smoking Status 98% 25.67 kg/m2 Hysterectomy Never Smoker Vitals History BMI and BSA Data Body Mass Index Body Surface Area  
 25.67 kg/m 2 1.87 m 2 Preferred Pharmacy Pharmacy Name Phone [facility] [address] [facility] [place] [phone] Your Updated Medication List  
  
   
This list is accurate as of 3/16/18  3:43 PM.  Always use your most recent med list.  
  
  
  
  
 * albuterol 2.5 mg /3 mL (0.083 %) nebulizer solution Commonly known as:  PROVENTIL VENTOLIN  
by Nebulization route once. * albuterol 90 mcg/actuation inhaler Commonly known as:  PROVENTIL HFA, VENTOLIN HFA, PROAIR HFA Take 2 Puffs by inhalation every four (4) hours as needed for Wheezing. amoxicillin-clavulanate 875-125 mg per tablet Commonly known as:  AUGMENTIN Take 1 Tab by mouth two (2) times a day for 10 days. aspirin 81 mg chewable tablet Take 81 mg by mouth daily. Cetirizine 10 mg Cap Take  by mouth. divalproex  mg tablet Commonly known as:  DEPAKOTE Take 1 Tab by mouth two (2) times a day. DULoxetine 20 mg capsule Commonly known as:  CYMBALTA Take 1 Cap by mouth daily. gabapentin 400 mg capsule Commonly known as:  NEURONTIN  
take 1 capsule by mouth three times a day montelukast 10 mg tablet Commonly known as:  SINGULAIR  
take 1 tablet by mouth once daily NAMENDA XR 28 mg capsule Generic drug:  memantine ER Take  by mouth daily. predniSONE 20 mg tablet Commonly known as:  Raynette Arcelia Take 2 Tabs by mouth daily (with breakfast) for 4 days. simvastatin 20 mg tablet Commonly known as:  ZOCOR Take 1 Tab by mouth nightly. SUMAtriptan 50 mg tablet Commonly known as:  IMITREX Take 1 Tab by mouth once as needed for Migraine for up to 1 dose. topiramate 100 mg tablet Commonly known as:  TOPAMAX Take 1 Tab by mouth two (2) times a day. * Notice: This list has 2 medication(s) that are the same as other medications prescribed for you. Read the directions carefully, and ask your doctor or other care provider to review them with you. Prescriptions Sent to Pharmacy Refills  
 simvastatin (ZOCOR) 20 mg tablet 1 Sig: Take 1 Tab by mouth nightly. Class: Normal  
 Pharmacy: [address] Ph #: [phone] Route: Oral  
 albuterol (PROVENTIL HFA, VENTOLIN HFA, PROAIR HFA) 90 mcg/actuation inhaler 3 Sig: Take 2 Puffs by inhalation every four (4) hours as needed for Wheezing. Class: Normal  
 Pharmacy: [address] Ph #: [phone] Route: Inhalation  
 predniSONE (DELTASONE) 20 mg tablet 0 Sig: Take 2 Tabs by mouth daily (with breakfast) for 4 days. Class: Normal  
 Pharmacy: [address] Ph #: [phone] Route: Oral  
 amoxicillin-clavulanate (AUGMENTIN) 875-125 mg per tablet 0 Sig: Take 1 Tab by mouth two (2) times a day for 10 days. Class: Normal  
 Pharmacy: [address] Ph #: [phone] Route: Oral  
  
We Performed the Following AMB POC RAPID INFLUENZA TEST [87914 CPT(R)] AMB POC RAPID STREP A [15698 CPT(R)] Follow-up Instructions Return if symptoms worsen or fail to improve. Patient Instructions Controlling Your Asthma: Care Instructions Your Care Instructions Asthma is a long-term condition that affects your breathing. It causes the airways that lead to the lungs to swell. During an asthma attack, the airways swell and narrow. This makes it hard to breathe. You may wheeze or cough. If you have a bad attack, you may need emergency care. There are two things to do to treat asthma. · Control asthma over the long term. · Treat attacks when they occur. You and your doctor can make an asthma action plan. It tells you what medicines you need to take every day to control asthma symptoms and what to do if you have an asthma attack. Your asthma action plan can help prevent and treat attacks. When you keep your asthma under control, you can prevent severe attacks and lasting damage to your airways. You need to treat your asthma even when you are not having symptoms. Although asthma is a lifelong disease, treatment can help control it and help you stay healthy. Follow-up care is a key part of your treatment and safety. Be sure to make and go to all appointments, and call your doctor if you are having problems. It's also a good idea to know your test results and keep a list of the medicines you take. How can you care for yourself at home? To control asthma over the long term Medicines Controller medicines reduce swelling in your lungs. They also prevent asthma attacks. Take your controller medicine exactly as prescribed. Talk to your doctor if you have any problems with your medicine. · Inhaled corticosteroid is a common and effective controller medicine. Using it the right way can prevent or reduce most side effects. · Take your controller medicine every day, not just when you have symptoms. It helps prevent problems before they occur. · Your doctor may prescribe another medicine that you use along with the corticosteroid. This is often a long-acting bronchodilator. Do not take this medicine by itself. Using a long-acting bronchodilator by itself can increase your risk of a severe or fatal asthma attack. · Do not take inhaled corticosteroids or long-acting bronchodilators to stop an asthma attack that has already started. They don't work fast enough to help. · Talk to your doctor before you use other medicines. Some medicines, such as aspirin, can cause asthma attacks in some people. Education · Learn what triggers an asthma attack. Avoid these triggers when you can. Common triggers include colds, smoke, air pollution, dust, pollen, mold, pets, cockroaches, stress, and cold air. · Check yourself for asthma symptoms to know which step to follow in your action plan. Watch for things like being short of breath, having chest tightness, coughing, and wheezing. Also notice if symptoms wake you up at night or if you get tired quickly when you exercise. · If you have a peak flow meter, use it to check how well you are breathing. It can help you know when an asthma attack is going to occur. Then you can take medicine to prevent the asthma attack or make it less severe. · Do not smoke or allow others to smoke around you. Avoid smoky places. Smoking makes asthma worse. If you need help quitting, talk to your doctor about stop-smoking programs and medicines. These can increase your chances of quitting for good. · Avoid colds and the flu. Get a pneumococcal vaccine shot. If you have had one before, ask your doctor whether you need a second dose. Get a flu vaccine every year, as soon as it's available. If you must be around people with colds or the flu, wash your hands often. To treat attacks when they occur Use your asthma action plan when you have an attack. Your quick-relief medicine will stop an asthma attack.  It relaxes the muscles that get tight around the airways. If your doctor prescribed corticosteroid pills to use during an attack, take them as directed. They may take hours to work, but they may shorten the attack and help you breathe better. · Albuterol is an effective quick-relief inhaler. · Take your quick-relief medicine exactly as prescribed. · Always bring your asthma medicine with you when you travel. · You may need to use quick-relief medicine before you exercise. · Call your doctor if you think you are having a problem with your medicine. When should you call for help? Call 911 anytime you think you may need emergency care. For example, call if: 
? · You are having severe trouble breathing. ?Call your doctor now or seek immediate medical care if: 
? · Your symptoms do not get better after you have followed your asthma action plan. ? · You cough up yellow, dark brown, or bloody mucus (sputum). ? Watch closely for changes in your health, and be sure to contact your doctor if: 
? · Your coughing and wheezing get worse. ? · You need to use your quick-relief medicine on more than 2 days a week (unless it is just for exercise). ? · You need help figuring out what is triggering your asthma attacks. Where can you learn more? Go to http://shell-td.info/. Enter Q406 in the search box to learn more about \"Controlling Your Asthma: Care Instructions. \" Current as of: May 12, 2017 Content Version: 11.4 © 0559-0426 [employer]. Care instructions adapted under license by [employer] (which disclaims liability or warranty for this information). If you have questions about a medical condition or this instruction, always ask your healthcare professional. [address] any warranty or liability for your use of this information. Introducing [facility]!    
 Karrie Benz introduces [facility] patient portal. Now you can access parts of your medical record, email your doctor's office, and request medication refills online. 1. In your internet browser, go to [email or web address] 2. Click on the First Time User? Click Here link in the Sign In box. You will see the New Member Sign Up page. 3. Enter your [employer] Access Code exactly as it appears below. You will not need to use this code after youve completed the sign-up process. If you do not sign up before the expiration date, you must request a new code. · [employer] Access Code: [email or web address] Expires: 6/6/2018 10:30 AM 
 
4. Enter the last four digits of your Social Security Number (xxxx) and Date of Birth (mm/dd/yyyy) as indicated and click Submit. You will be taken to the next sign-up page. 5. Create a [employer] ID. This will be your [employer] login ID and cannot be changed, so think of one that is secure and easy to remember. 6. Create a [employer] password. You can change your password at any time. 7. Enter your Password Reset Question and Answer. This can be used at a later time if you forget your password. 8. Enter your e-mail address. You will receive e-mail notification when new information is available in [address] 9. Click Sign Up. You can now view and download portions of your medical record. 10. Click the Download Summary menu link to download a portable copy of your medical information. If you have questions, please visit the Frequently Asked Questions section of the [employer] website. Remember, [employer] is NOT to be used for urgent needs. For medical emergencies, dial 911. Now available from your iPhone and Android! Please provide this summary of care documentation to your next provider. Your primary care clinician is listed as [facility]. If you have any questions after today's visit, please call [phone].

## 2018-03-16 NOTE — PROGRESS NOTES
Chief Complaint   Patient presents with    Cough     productive    Sore Throat    Nasal Congestion    Chills     Pt c/o the above symptoms since March 5th. Pt states the symptoms stopped for a day or so then came right back. Pt has tried OTC sinus medication with no relief.

## 2018-03-19 NOTE — PROGRESS NOTES
Chief Complaint   Patient presents with    Cough     productive    Sore Throat    Nasal Congestion    Chills       Subjective:   Delmy Robison is a 56 y.o. female who complains of congestion, sore throat, productive cough, headache, chills and green nasal discharge for 10 days, gradually worsening since that time. + wheezing. She denies a history of fevers, nausea, shortness of breath, vomiting. Evaluation to date: none. Treatment to date: OTC products, which have been not very effective. Patient does not smoke cigarettes. Relevant PMH: Asthma. 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     Protonix [Pantoprazole] Rash    Vicodin [Hydrocodone-Acetaminophen] Unknown (comments)     Causes Migraines    Vitamins For Infusion Unknown (comments)     Pt stated all vitamins cause her lightheadness and sick     Past Medical History:   Diagnosis Date    Anal fistula     Asthma     Back ache     CAD (coronary artery disease)     leaky valves    Cholesterol blood decreased     Dementia     Depression     Deviated septum     Diabetes (HCC)     pre diabetese    Fibromyalgia     Frequent headaches     Heart valve disease     Herniated nucleus pulposus, L5-S1     Migraine     Protein S deficiency (HCC)     Pulmonary emboli (HCC)     Pulmonary hypertension     Restless leg syndrome     RLS (restless legs syndrome)     Sickle cell trait ([address]     Sleep apnea     Stroke ([address]     Upper airway resistance syndrome     Vaginal fistula      Past Surgical History:   Procedure Laterality Date    CARDIAC SURG PROCEDURE UNLIST  9/23/99    closure of unroofed coronary sinus    HX ROTATOR CUFF REPAIR      3/2017     Family History   Problem Relation Age of Onset    Diabetes Mother     Heart Disease Mother     Hypertension Mother     Sickle Cell Anemia Sister     Hypertension Brother     Stroke Maternal Aunt      Social History   Substance Use Topics    Smoking status: Never Smoker    Smokeless tobacco: Never Used    Alcohol use No        Review of Systems  A comprehensive review of systems was negative except for that written in the HPI. Objective:     Visit Vitals    /75    Pulse 62    Temp 98.2 °F (36.8 °C) (Oral)    Resp 18    Ht 5' 7\" (1.702 m)    Wt 163 lb 14.4 oz (74.3 kg)    SpO2 98%    BMI 25.67 kg/m2     General:  alert, cooperative, no distress, mildly ill-appearing   Eyes: negative   Ears: normal TM's and external ear canals AU   Sinuses: tenderness over bilateral maxillary   Mouth:  abnormal findings: mild oropharyngeal erythema   Neck: supple, symmetrical, trachea midline, no adenopathy and no JVD. Heart: S1 and S2 normal, no murmurs noted. Lungs: wheezes scattered   Abdomen: soft, non-tender. Bowel sounds normal. No masses,  no organomegaly        Results for orders placed or performed in visit on 03/16/18   AMB POC RAPID INFLUENZA TEST   Result Value Ref Range    VALID INTERNAL CONTROL POC Yes     QuickVue Influenza test Negative Negative   AMB POC RAPID STREP A   Result Value Ref Range    VALID INTERNAL CONTROL POC Yes     Group A Strep Ag Negative Negative       Assessment/Plan:   sinusitis  Suggested symptomatic OTC remedies. Antibiotics per orders. RTC prn. Diagnoses and all orders for this visit:    1.  Acute non-recurrent maxillary sinusitis  Add rx  mucinex BID   Push fluids  rest  -     amoxicillin-clavulanate (AUGMENTIN) 875-125 mg per tablet; Take 1 Tab by mouth two (2) times a day for 10 days. 2. Mild intermittent asthma with exacerbation  Add steroid burst  Albuterol prn wheezing, shortness of breath, cough  -     albuterol (PROVENTIL HFA, VENTOLIN HFA, PROAIR HFA) 90 mcg/actuation inhaler; Take 2 Puffs by inhalation every four (4) hours as needed for Wheezing.  -     predniSONE (DELTASONE) 20 mg tablet; Take 2 Tabs by mouth daily (with breakfast) for 4 days. 3. Hypercholesteremia  TLC Diet:  -- Saturated fat <7% of calories, cholesterol <200 mg/day  -- Consider increased viscous (soluble) fiber (10-25 g/day) and plant stanols/sterols  (2g/day) as therapeutic options to enhance LDL lowering  Weight management  Increased physical activity. Continue rx  -     simvastatin (ZOCOR) 20 mg tablet; Take 1 Tab by mouth nightly. 4. Sore throat  -     AMB POC RAPID STREP A    5. Chills  -     AMB POC RAPID INFLUENZA TEST      Follow-up Disposition:  Return if symptoms worsen or fail to improve. Jo Ann Chavez

## 2018-03-26 RX ORDER — SUMATRIPTAN 50 MG/1
50 TABLET, FILM COATED ORAL
Qty: 9 TAB | Refills: 2 | OUTPATIENT
Start: 2018-03-26

## 2018-03-26 NOTE — TELEPHONE ENCOUNTER
Spoke with patient, informed her that it is too soon to refill Sumatriptan. She states she isn't sure if she picked up that medication from her pharmacy yet, will go home and check to see if it's available. Will call back if she needs to.

## 2018-03-26 NOTE — TELEPHONE ENCOUNTER
Requested Prescriptions     Pending Prescriptions Disp Refills    SUMAtriptan (IMITREX) 50 mg tablet 9 Tab 2     Sig: Take 1 Tab by mouth once as needed for Migraine for up to 1 dose.

## 2018-03-26 NOTE — TELEPHONE ENCOUNTER
Spoke with pharmacist at [facility]. She states it is too soon to refill sumatriptan medication.

## 2018-03-27 ENCOUNTER — OFFICE VISIT (OUTPATIENT)
Dept: FAMILY MEDICINE CLINIC | Age: 58
End: 2018-03-27

## 2018-03-27 VITALS
HEART RATE: 62 BPM | SYSTOLIC BLOOD PRESSURE: 108 MMHG | TEMPERATURE: 97.6 F | BODY MASS INDEX: 25.88 KG/M2 | DIASTOLIC BLOOD PRESSURE: 67 MMHG | OXYGEN SATURATION: 99 % | RESPIRATION RATE: 17 BRPM | HEIGHT: 67 IN | WEIGHT: 164.9 LBS

## 2018-03-27 DIAGNOSIS — R22.42 MASS OF FOOT, LEFT: ICD-10-CM

## 2018-03-27 DIAGNOSIS — J45.41 MODERATE PERSISTENT ASTHMA WITH ACUTE EXACERBATION IN ADULT: Primary | ICD-10-CM

## 2018-03-27 RX ORDER — PREDNISONE 20 MG/1
40 TABLET ORAL
Qty: 10 TAB | Refills: 0 | Status: SHIPPED | OUTPATIENT
Start: 2018-03-27 | End: 2018-04-01

## 2018-03-27 RX ORDER — ALBUTEROL SULFATE 0.83 MG/ML
2.5 SOLUTION RESPIRATORY (INHALATION)
Qty: 25 EACH | Refills: 2 | Status: SHIPPED | OUTPATIENT
Start: 2018-03-27

## 2018-03-27 RX ORDER — ALBUTEROL SULFATE 0.83 MG/ML
2.5 SOLUTION RESPIRATORY (INHALATION) ONCE
Qty: 1 EACH | Refills: 0
Start: 2018-03-27 | End: 2018-03-27 | Stop reason: SDUPTHER

## 2018-03-27 RX ORDER — FLUTICASONE PROPIONATE AND SALMETEROL 250; 50 UG/1; UG/1
1 POWDER RESPIRATORY (INHALATION) EVERY 12 HOURS
Qty: 1 INHALER | Refills: 5 | Status: SHIPPED | OUTPATIENT
Start: 2018-03-27 | End: 2018-10-09 | Stop reason: SDUPTHER

## 2018-03-27 NOTE — PATIENT INSTRUCTIONS
Controlling Your Asthma: Care Instructions  Your Care Instructions    Asthma is a long-term condition that affects your breathing. It causes the airways that lead to the lungs to swell. During an asthma attack, the airways swell and narrow. This makes it hard to breathe. You may wheeze or cough. If you have a bad attack, you may need emergency care. There are two things to do to treat asthma. · Control asthma over the long term. · Treat attacks when they occur. You and your doctor can make an asthma action plan. It tells you what medicines you need to take every day to control asthma symptoms and what to do if you have an asthma attack. Your asthma action plan can help prevent and treat attacks. When you keep your asthma under control, you can prevent severe attacks and lasting damage to your airways. You need to treat your asthma even when you are not having symptoms. Although asthma is a lifelong disease, treatment can help control it and help you stay healthy. Follow-up care is a key part of your treatment and safety. Be sure to make and go to all appointments, and call your doctor if you are having problems. It's also a good idea to know your test results and keep a list of the medicines you take. How can you care for yourself at home? To control asthma over the long term  Medicines  Controller medicines reduce swelling in your lungs. They also prevent asthma attacks. Take your controller medicine exactly as prescribed. Talk to your doctor if you have any problems with your medicine. · Inhaled corticosteroid is a common and effective controller medicine. Using it the right way can prevent or reduce most side effects. · Take your controller medicine every day, not just when you have symptoms. It helps prevent problems before they occur. · Your doctor may prescribe another medicine that you use along with the corticosteroid. This is often a long-acting bronchodilator.  Do not take this medicine by itself. Using a long-acting bronchodilator by itself can increase your risk of a severe or fatal asthma attack. · Do not take inhaled corticosteroids or long-acting bronchodilators to stop an asthma attack that has already started. They don't work fast enough to help. · Talk to your doctor before you use other medicines. Some medicines, such as aspirin, can cause asthma attacks in some people. Education  · Learn what triggers an asthma attack. Avoid these triggers when you can. Common triggers include colds, smoke, air pollution, dust, pollen, mold, pets, cockroaches, stress, and cold air. · Check yourself for asthma symptoms to know which step to follow in your action plan. Watch for things like being short of breath, having chest tightness, coughing, and wheezing. Also notice if symptoms wake you up at night or if you get tired quickly when you exercise. · If you have a peak flow meter, use it to check how well you are breathing. It can help you know when an asthma attack is going to occur. Then you can take medicine to prevent the asthma attack or make it less severe. · Do not smoke or allow others to smoke around you. Avoid smoky places. Smoking makes asthma worse. If you need help quitting, talk to your doctor about stop-smoking programs and medicines. These can increase your chances of quitting for good. · Avoid colds and the flu. Get a pneumococcal vaccine shot. If you have had one before, ask your doctor whether you need a second dose. Get a flu vaccine every year, as soon as it's available. If you must be around people with colds or the flu, wash your hands often. To treat attacks when they occur  Use your asthma action plan when you have an attack. Your quick-relief medicine will stop an asthma attack. It relaxes the muscles that get tight around the airways. If your doctor prescribed corticosteroid pills to use during an attack, take them as directed.  They may take hours to work, but they may shorten the attack and help you breathe better. · Albuterol is an effective quick-relief inhaler. · Take your quick-relief medicine exactly as prescribed. · Always bring your asthma medicine with you when you travel. · You may need to use quick-relief medicine before you exercise. · Call your doctor if you think you are having a problem with your medicine. When should you call for help? Call 911 anytime you think you may need emergency care. For example, call if:  ? · You are having severe trouble breathing. ?Call your doctor now or seek immediate medical care if:  ? · Your symptoms do not get better after you have followed your asthma action plan. ? · You cough up yellow, dark brown, or bloody mucus (sputum). ? Watch closely for changes in your health, and be sure to contact your doctor if:  ? · Your coughing and wheezing get worse. ? · You need to use your quick-relief medicine on more than 2 days a week (unless it is just for exercise). ? · You need help figuring out what is triggering your asthma attacks. Where can you learn more? Go to http://shell-td.info/. Enter H568 in the search box to learn more about \"Controlling Your Asthma: Care Instructions. \"  Current as of: May 12, 2017  Content Version: 11.4  © 9687-3906 Healthwise, Inc[employer]. Care instructions adapted under license by [employer] (which disclaims liability or warranty for this information). If you have questions about a medical condition or this instruction, always ask your healthcare professional. [place] [address] any warranty or liability for your use of this information.

## 2018-03-27 NOTE — PROGRESS NOTES
Subjective:   Mari Goyal is a 56 y.o. female seen urgently with exacerbation of asthma for 10-14 days. Wheezing is described as moderate. Associated symptoms:dry cough. Current asthma medications: Proventil MDI (or generic equivalent). Patient does not smoke cigarettes. Reports she has been managed with advair in the past, but has not been taking for awhile. She is using her inhaler several times a day for coughing and wheezing.   + shortness of breath. The pain/sinus pressure and nasal discharge resolved with antibiotics. She completed the ordered steroid burst (4 days) 2 weeks ago, wheezing improved but then worsened again. No fever or chills. No purulent nasal discharge. No sputum production. C/o lump on top of left foot, nontender, present for a few months. Recently she noticed a second small lump has formed near the initial one. Review of Systems  A comprehensive review of systems was negative except for that written in the HPI. Objective:     Visit Vitals    /67    Pulse 62    Temp 97.6 °F (36.4 °C) (Oral)    Resp 17    Ht 5' 7\" (1.702 m)    Wt 164 lb 14.4 oz (74.8 kg)    SpO2 99%    BMI 25.83 kg/m2       General:  alert, cooperative, no distress   HEENT:  ENT exam normal, no neck nodes or sinus tenderness, neck without nodes and throat normal without erythema or exudate   Lungs: wheezes R base, L base, diminished breath sounds R base, L base   Heart:  regular rate and rhythm, S1, S2 normal, no murmur, click, rub or gallop   Abdomen: soft, non-tender. Bowel sounds normal. No masses,  no organomegaly   Extremities: extremities normal, atraumatic, no cyanosis or edema. Small, soft, mobile mass x2 noted on dorsum of left foot proximal to toes. Post neb exam: wheezing resolved, improved air exchange. Patient reports improved ease of breathing.      Assessment/Plan:   Asthma, acute exacerbation  oximetry on room air was 99%  after office therapy, chest was clear  RX per orders - Use bronchodilator MDI 2 puff q4h prn, steroid MDI regularly to prevent asthma and oral steroid taper as prescribed. Additional suggestions to patient: push fluids and rest.   Diagnoses and all orders for this visit:    1. Moderate persistent asthma with acute exacerbation in adult  Steroid burst  Restart advair  Albuterol via neb or MDI PRN  -     albuterol (PROVENTIL VENTOLIN) 2.5 mg /3 mL (0.083 %) nebulizer solution; 3 mL by Nebulization route once for 1 dose. -     ALBUTEROL, INHAL. AQP.()  -     INHAL RX, AIRWAY OBST/DX SPUTUM INDUCT ([id number])  -     predniSONE (DELTASONE) 20 mg tablet; Take 2 Tabs by mouth daily (with breakfast) for 5 days. -     fluticasone-salmeterol (ADVAIR) 250-50 mcg/dose diskus inhaler; Take 1 Puff by inhalation every twelve (12) hours. 2. Mass of foot, left  Cyst vs lipoma vs other  - check US  -     US EXT NONVAS LT LTD; Future      Follow-up Disposition:  Return in about 2 weeks (around 4/10/2018). I have discussed the diagnosis with the patient and the intended plan as seen in the above orders. The patient has received an after-visit summary and questions were answered concerning future plans. Patient conveyed understanding of the plan at the time of the visit.     Ryan Ca NP  03/27/18

## 2018-03-27 NOTE — MR AVS SNAPSHOT
[address] [facility] [address] 
[phone] Patient: Naye Mccarthy MRN: [id number] :1960 Visit Information Date & Time Provider Department Dept. Phone Encounter #  
 3/27/2018  9:45 AM Hayden Galdamez NP [facility] [id number] Follow-up Instructions Return in about 2 weeks (around 4/10/2018). Your Appointments 2018  9:40 AM  
Follow Up with DO Elliot Kumar [facility]: 6 week f/u headache  
 [address] [facility] [address]  
[phone]  
  
   
 [address] [place] [address] Upcoming Health Maintenance Date Due  
 MEDICARE YEARLY EXAM 2018 BREAST CANCER SCRN MAMMOGRAM 2019 PAP AKA CERVICAL CYTOLOGY 2019 COLONOSCOPY 3/22/2026 DTaP/Tdap/Td series (2 - Td) 2026 Allergies as of 3/27/2018  Review Complete On: 3/27/2018 By: Todd Reyna LPN Severity Noted Reaction Type Reactions Ambien [Zolpidem]  2016    Unknown (comments) Causes Migraine Cephalexin  2016    Rash Morphine  2015    Rash Motrin [Ibuprofen]  2015    Swelling Nortriptyline  2016    Unknown (comments) Bleeding from mouth Pepcid [Famotidine]  2017    Other (comments) Migraines. Protonix [Pantoprazole]  2016    Rash Vicodin [Hydrocodone-acetaminophen]  2016    Unknown (comments) Causes Migraines Vitamins For Infusion  2016    Unknown (comments) Pt stated all vitamins cause her lightheadness and sick Current Immunizations  Reviewed on 3/13/2018 Name Date Influenza High Dose Vaccine PF 8/15/2016 Influenza Vaccine 2015 Influenza Vaccine (Quad) PF 2017 Pneumococcal Vaccine (Unspecified Type) 2015 Tdap 12/13/2016 Zoster Vaccine, Live 6/13/2017 Not reviewed this visit You Were Diagnosed With   
  
 Codes Comments Moderate persistent asthma with acute exacerbation in adult    -  Primary ICD-10-CM: J45.41 
ICD-9-CM: 493.92 Mass of foot, left     ICD-10-CM: R22.42 
ICD-9-CM: 782. 2 Vitals BP Pulse Temp Resp Height(growth percentile) Weight(growth percentile) 108/67 62 97.6 °F (36.4 °C) (Oral) 17 5' 7\" (1.702 m) 164 lb 14.4 oz (74.8 kg) SpO2 BMI OB Status Smoking Status 99% 25.83 kg/m2 Hysterectomy Never Smoker BMI and BSA Data Body Mass Index Body Surface Area  
 25.83 kg/m 2 1.88 m 2 Preferred Pharmacy Pharmacy Name Phone [facility] [address] Dagmar Reveles [phone] Your Updated Medication List  
  
   
This list is accurate as of 3/27/18  9:54 AM.  Always use your most recent med list.  
  
  
  
  
 * albuterol 2.5 mg /3 mL (0.083 %) nebulizer solution Commonly known as:  PROVENTIL VENTOLIN  
by Nebulization route once. * albuterol 90 mcg/actuation inhaler Commonly known as:  PROVENTIL HFA, VENTOLIN HFA, PROAIR HFA Take 2 Puffs by inhalation every four (4) hours as needed for Wheezing. * albuterol 2.5 mg /3 mL (0.083 %) nebulizer solution Commonly known as:  PROVENTIL VENTOLIN  
3 mL by Nebulization route once for 1 dose. aspirin 81 mg chewable tablet Take 81 mg by mouth daily. Cetirizine 10 mg Cap Take  by mouth. divalproex  mg tablet Commonly known as:  DEPAKOTE Take 1 Tab by mouth two (2) times a day. DULoxetine 20 mg capsule Commonly known as:  CYMBALTA Take 1 Cap by mouth daily. fluticasone-salmeterol 250-50 mcg/dose diskus inhaler Commonly known as:  ADVAIR Take 1 Puff by inhalation every twelve (12) hours. gabapentin 400 mg capsule Commonly known as:  NEURONTIN  
take 1 capsule by mouth three times a day montelukast 10 mg tablet Commonly known as:  SINGULAIR  
take 1 tablet by mouth once daily NAMENDA XR 28 mg capsule Generic drug:  memantine ER Take  by mouth daily. predniSONE 20 mg tablet Commonly known as:  Karin Mccoy Take 2 Tabs by mouth daily (with breakfast) for 5 days. simvastatin 20 mg tablet Commonly known as:  ZOCOR Take 1 Tab by mouth nightly. SUMAtriptan 50 mg tablet Commonly known as:  IMITREX Take 1 Tab by mouth once as needed for Migraine for up to 1 dose. topiramate 100 mg tablet Commonly known as:  TOPAMAX Take 1 Tab by mouth two (2) times a day. * Notice: This list has 3 medication(s) that are the same as other medications prescribed for you. Read the directions carefully, and ask your doctor or other care provider to review them with you. Prescriptions Sent to Pharmacy Refills  
 predniSONE (DELTASONE) 20 mg tablet 0 Sig: Take 2 Tabs by mouth daily (with breakfast) for 5 days. Class: Normal  
 Pharmacy: [address] Ph #: [phone] Route: Oral  
 fluticasone-salmeterol (ADVAIR) 250-50 mcg/dose diskus inhaler 5 Sig: Take 1 Puff by inhalation every twelve (12) hours. Class: Normal  
 Pharmacy: [address] Ph #: [phone] Route: Inhalation We Performed the Following ALBUTEROL, INHAL. SOL., FDA-APPROVED FINAL, NON-COMPOUND UNIT DOSE, 1 MG [ [facility]] INHAL RX, AIRWAY OBST/DX SPUTUM INDUCT [id number] CPT(R)] Follow-up Instructions Return in about 2 weeks (around 4/10/2018). To-Do List   
 03/27/2018 Imaging:  US EXT NONVAS LT LTD Patient Instructions Controlling Your Asthma: Care Instructions Your Care Instructions Asthma is a long-term condition that affects your breathing. It causes the airways that lead to the lungs to swell.  During an asthma attack, the airways swell and narrow. This makes it hard to breathe. You may wheeze or cough. If you have a bad attack, you may need emergency care. There are two things to do to treat asthma. · Control asthma over the long term. · Treat attacks when they occur. You and your doctor can make an asthma action plan. It tells you what medicines you need to take every day to control asthma symptoms and what to do if you have an asthma attack. Your asthma action plan can help prevent and treat attacks. When you keep your asthma under control, you can prevent severe attacks and lasting damage to your airways. You need to treat your asthma even when you are not having symptoms. Although asthma is a lifelong disease, treatment can help control it and help you stay healthy. Follow-up care is a key part of your treatment and safety. Be sure to make and go to all appointments, and call your doctor if you are having problems. It's also a good idea to know your test results and keep a list of the medicines you take. How can you care for yourself at home? To control asthma over the long term Medicines Controller medicines reduce swelling in your lungs. They also prevent asthma attacks. Take your controller medicine exactly as prescribed. Talk to your doctor if you have any problems with your medicine. · Inhaled corticosteroid is a common and effective controller medicine. Using it the right way can prevent or reduce most side effects. · Take your controller medicine every day, not just when you have symptoms. It helps prevent problems before they occur. · Your doctor may prescribe another medicine that you use along with the corticosteroid. This is often a long-acting bronchodilator. Do not take this medicine by itself. Using a long-acting bronchodilator by itself can increase your risk of a severe or fatal asthma attack.  
· Do not take inhaled corticosteroids or long-acting bronchodilators to stop an asthma attack that has already started. They don't work fast enough to help. · Talk to your doctor before you use other medicines. Some medicines, such as aspirin, can cause asthma attacks in some people. Education · Learn what triggers an asthma attack. Avoid these triggers when you can. Common triggers include colds, smoke, air pollution, dust, pollen, mold, pets, cockroaches, stress, and cold air. · Check yourself for asthma symptoms to know which step to follow in your action plan. Watch for things like being short of breath, having chest tightness, coughing, and wheezing. Also notice if symptoms wake you up at night or if you get tired quickly when you exercise. · If you have a peak flow meter, use it to check how well you are breathing. It can help you know when an asthma attack is going to occur. Then you can take medicine to prevent the asthma attack or make it less severe. · Do not smoke or allow others to smoke around you. Avoid smoky places. Smoking makes asthma worse. If you need help quitting, talk to your doctor about stop-smoking programs and medicines. These can increase your chances of quitting for good. · Avoid colds and the flu. Get a pneumococcal vaccine shot. If you have had one before, ask your doctor whether you need a second dose. Get a flu vaccine every year, as soon as it's available. If you must be around people with colds or the flu, wash your hands often. To treat attacks when they occur Use your asthma action plan when you have an attack. Your quick-relief medicine will stop an asthma attack. It relaxes the muscles that get tight around the airways. If your doctor prescribed corticosteroid pills to use during an attack, take them as directed. They may take hours to work, but they may shorten the attack and help you breathe better. · Albuterol is an effective quick-relief inhaler. · Take your quick-relief medicine exactly as prescribed. · Always bring your asthma medicine with you when you travel. · You may need to use quick-relief medicine before you exercise. · Call your doctor if you think you are having a problem with your medicine. When should you call for help? Call 911 anytime you think you may need emergency care. For example, call if: 
? · You are having severe trouble breathing. ?Call your doctor now or seek immediate medical care if: 
? · Your symptoms do not get better after you have followed your asthma action plan. ? · You cough up yellow, dark brown, or bloody mucus (sputum). ? Watch closely for changes in your health, and be sure to contact your doctor if: 
? · Your coughing and wheezing get worse. ? · You need to use your quick-relief medicine on more than 2 days a week (unless it is just for exercise). ? · You need help figuring out what is triggering your asthma attacks. Where can you learn more? Go to http://shell-td.info/. Enter U773 in the search box to learn more about \"Controlling Your Asthma: Care Instructions. \" Current as of: May 12, 2017 Content Version: 11.4 © 4282-7350 [employer]. Care instructions adapted under license by [employer] (which disclaims liability or warranty for this information). If you have questions about a medical condition or this instruction, always ask your healthcare professional. [address] any warranty or liability for your use of this information. Introducing hospitals & HEALTH SERVICES! [employer] introduces [employer] patient portal. Now you can access parts of your medical record, email your doctor's office, and request medication refills online. 1. In your internet browser, go to [email or web address] 2. Click on the First Time User? Click Here link in the Sign In box. You will see the New Member Sign Up page. 3. Enter your [employer] Access Code exactly as it appears below.  You will not need to use this code after youve completed the sign-up process. If you do not sign up before the expiration date, you must request a new code. · [employer] Access Code: [email or web address] Expires: 6/6/2018 10:30 AM 
 
4. Enter the last four digits of your Social Security Number (xxxx) and Date of Birth (mm/dd/yyyy) as indicated and click Submit. You will be taken to the next sign-up page. 5. Create a [employer] ID. This will be your [employer] login ID and cannot be changed, so think of one that is secure and easy to remember. 6. Create a [employer] password. You can change your password at any time. 7. Enter your Password Reset Question and Answer. This can be used at a later time if you forget your password. 8. Enter your e-mail address. You will receive e-mail notification when new information is available in [address] 9. Click Sign Up. You can now view and download portions of your medical record. 10. Click the Download Summary menu link to download a portable copy of your medical information. If you have questions, please visit the Frequently Asked Questions section of the [employer] website. Remember, [employer] is NOT to be used for urgent needs. For medical emergencies, dial 911. Now available from your iPhone and Android! Please provide this summary of care documentation to your next provider. Your primary care clinician is listed as Sarahi Berman. If you have any questions after today's visit, please call [phone].

## 2018-03-27 NOTE — PROGRESS NOTES
Chief Complaint   Patient presents with    Cough     Dry    Foot Swelling     left foot     Pt c/o dry cough X 1 month, pt states she has trid OTC cough medication with no relief. Pt states she has some swelling and irritation to top area of left foot.

## 2018-04-02 ENCOUNTER — HOSPITAL ENCOUNTER (OUTPATIENT)
Dept: ULTRASOUND IMAGING | Age: 58
Discharge: HOME | End: 2018-04-02
Attending: NURSE PRACTITIONER
Payer: MEDICARE

## 2018-04-02 DIAGNOSIS — R22.42 MASS OF FOOT, LEFT: ICD-10-CM

## 2018-04-02 PROCEDURE — 76882 US LMTD JT/FCL EVL NVASC XTR: CPT

## 2018-04-05 ENCOUNTER — TELEPHONE (OUTPATIENT)
Dept: FAMILY MEDICINE CLINIC | Age: 58
End: 2018-04-05

## 2018-04-05 NOTE — TELEPHONE ENCOUNTER
Patient is requesting a refill for her medication for acid reflux?   I told her to call the pharmacy to get them to request the exact medication     Please advise

## 2018-04-08 RX ORDER — RANITIDINE 150 MG/1
TABLET, FILM COATED ORAL
Qty: 180 TAB | Refills: 1 | Status: SHIPPED | OUTPATIENT
Start: 2018-04-08 | End: 2018-10-09 | Stop reason: SDUPTHER

## 2018-04-10 ENCOUNTER — OFFICE VISIT (OUTPATIENT)
Dept: FAMILY MEDICINE CLINIC | Age: 58
End: 2018-04-10

## 2018-04-10 VITALS
HEIGHT: 67 IN | HEART RATE: 59 BPM | OXYGEN SATURATION: 98 % | BODY MASS INDEX: 26.01 KG/M2 | RESPIRATION RATE: 19 BRPM | DIASTOLIC BLOOD PRESSURE: 72 MMHG | SYSTOLIC BLOOD PRESSURE: 129 MMHG | TEMPERATURE: 98.1 F | WEIGHT: 165.7 LBS

## 2018-04-10 DIAGNOSIS — J45.40 MODERATE PERSISTENT ASTHMA WITHOUT COMPLICATION: ICD-10-CM

## 2018-04-10 DIAGNOSIS — R22.42 MASS OF LEFT FOOT: Primary | ICD-10-CM

## 2018-04-10 NOTE — PATIENT INSTRUCTIONS
Controlling Your Asthma: Care Instructions  Your Care Instructions    Asthma is a long-term condition that affects your breathing. It causes the airways that lead to the lungs to swell. During an asthma attack, the airways swell and narrow. This makes it hard to breathe. You may wheeze or cough. If you have a bad attack, you may need emergency care. There are two things to do to treat asthma. · Control asthma over the long term. · Treat attacks when they occur. You and your doctor can make an asthma action plan. It tells you what medicines you need to take every day to control asthma symptoms and what to do if you have an asthma attack. Your asthma action plan can help prevent and treat attacks. When you keep your asthma under control, you can prevent severe attacks and lasting damage to your airways. You need to treat your asthma even when you are not having symptoms. Although asthma is a lifelong disease, treatment can help control it and help you stay healthy. Follow-up care is a key part of your treatment and safety. Be sure to make and go to all appointments, and call your doctor if you are having problems. It's also a good idea to know your test results and keep a list of the medicines you take. How can you care for yourself at home? To control asthma over the long term  Medicines  Controller medicines reduce swelling in your lungs. They also prevent asthma attacks. Take your controller medicine exactly as prescribed. Talk to your doctor if you have any problems with your medicine. · Inhaled corticosteroid is a common and effective controller medicine. Using it the right way can prevent or reduce most side effects. · Take your controller medicine every day, not just when you have symptoms. It helps prevent problems before they occur. · Your doctor may prescribe another medicine that you use along with the corticosteroid. This is often a long-acting bronchodilator.  Do not take this medicine by itself. Using a long-acting bronchodilator by itself can increase your risk of a severe or fatal asthma attack. · Do not take inhaled corticosteroids or long-acting bronchodilators to stop an asthma attack that has already started. They don't work fast enough to help. · Talk to your doctor before you use other medicines. Some medicines, such as aspirin, can cause asthma attacks in some people. Education  · Learn what triggers an asthma attack. Avoid these triggers when you can. Common triggers include colds, smoke, air pollution, dust, pollen, mold, pets, cockroaches, stress, and cold air. · Check yourself for asthma symptoms to know which step to follow in your action plan. Watch for things like being short of breath, having chest tightness, coughing, and wheezing. Also notice if symptoms wake you up at night or if you get tired quickly when you exercise. · If you have a peak flow meter, use it to check how well you are breathing. It can help you know when an asthma attack is going to occur. Then you can take medicine to prevent the asthma attack or make it less severe. · Do not smoke or allow others to smoke around you. Avoid smoky places. Smoking makes asthma worse. If you need help quitting, talk to your doctor about stop-smoking programs and medicines. These can increase your chances of quitting for good. · Avoid colds and the flu. Get a pneumococcal vaccine shot. If you have had one before, ask your doctor whether you need a second dose. Get a flu vaccine every year, as soon as it's available. If you must be around people with colds or the flu, wash your hands often. To treat attacks when they occur  Use your asthma action plan when you have an attack. Your quick-relief medicine will stop an asthma attack. It relaxes the muscles that get tight around the airways. If your doctor prescribed corticosteroid pills to use during an attack, take them as directed.  They may take hours to work, but they may shorten the attack and help you breathe better. · Albuterol is an effective quick-relief inhaler. · Take your quick-relief medicine exactly as prescribed. · Always bring your asthma medicine with you when you travel. · You may need to use quick-relief medicine before you exercise. · Call your doctor if you think you are having a problem with your medicine. When should you call for help? Call 911 anytime you think you may need emergency care. For example, call if:  ? · You are having severe trouble breathing. ?Call your doctor now or seek immediate medical care if:  ? · Your symptoms do not get better after you have followed your asthma action plan. ? · You cough up yellow, dark brown, or bloody mucus (sputum). ? Watch closely for changes in your health, and be sure to contact your doctor if:  ? · Your coughing and wheezing get worse. ? · You need to use your quick-relief medicine on more than 2 days a week (unless it is just for exercise). ? · You need help figuring out what is triggering your asthma attacks. Where can you learn more? Go to http://shell-td.info/. Enter I404 in the search box to learn more about \"Controlling Your Asthma: Care Instructions. \"  Current as of: May 12, 2017  Content Version: 11.4  © 6083-6659 Healthwise, Incorporated. Care instructions adapted under license by [employer] (which disclaims liability or warranty for this information). If you have questions about a medical condition or this instruction, always ask your healthcare professional. [place] [address] any warranty or liability for your use of this information.

## 2018-04-10 NOTE — PROGRESS NOTES
Subjective:      Ambrocio Ochoa is an 56 y.o. female who for an asthma follow-up visit, following exacerbation. Doing well, reports good compliance with advair. Using albuterol infrequently now. No wheezing. Still gets a bit short of breath with exertion. No fever or chills. No sputum production. Patient does not smoke cigarettes. Reports left foot mass continues to be painful and tender at times. She feels it is increasing in size. Review of Systems   A comprehensive review of systems was negative except for that written in the HPI. Objective:     Visit Vitals    /72    Pulse (!) 59    Temp 98.1 °F (36.7 °C) (Oral)    Resp 19    Ht 5' 7\" (1.702 m)    Wt 165 lb 11.2 oz (75.2 kg)    SpO2 98%    BMI 25.95 kg/m2        General:  alert, cooperative, no distress   HEENT:  ENT exam normal, no neck nodes or sinus tenderness   Lungs: clear to auscultation bilaterally   Heart:  regular rate and rhythm, S1, S2 normal, no murmur, click, rub or gallop   Abdomen: soft, non-tender. Bowel sounds normal. No masses,  no organomegaly   Extremities: extremities normal, atraumatic, no cyanosis or edema. Left foot mobile, soft mass on dorsum at midfoot, mild tenderness, approx 4.5 cm x 2 cm   skin Normal coloration and turgor     US report 4/2/18 reviewed, reveals : 4.7 cm x 1.4 cm x 8 mm avascular, inhomogeneous hypoechoic lesion within the subcutaneous soft tissues of the anterior medial distal left foot. Assessment/Plan: Moderate persistent RAD, doing well on current treatment. Reviewed treatment goals of prevention of symptoms, minimizing limitation in activity, prevention of exacerbations and use of ER/inpatient care. Discussed distinction between quick-relief and controlled medications. Discussed medication dosage, use, side effects, and goals of treatment in detail. Warning signs of respiratory distress were reviewed with parents and or patient.   Discussed pathophysiology of asthma. Discussed medication dosage, use, side effects, and goals of treatment in detail. .    Diagnoses and all orders for this visit:    1. Mass of left foot  dwp options including watchful waiting, further imaging such as MR, or evaluation for possible intervention with surgeon  She would like to meet with surgery, referral entered  -     [facility] ref [facility]    2. Moderate persistent asthma without complication  Symptoms now well controlled on advair  Continue rx  Albuterol prn  Avoid triggers  Asthma f/u q3 months    Follow-up Disposition:  Return in about 3 months (around 7/10/2018) for f/u asthma. .    I have discussed the diagnosis with the patient and the intended plan as seen in the above orders. The patient has received an after-visit summary and questions were answered concerning future plans. Patient conveyed understanding of the plan at the time of the visit.     Deepali Robin NP  04/10/18

## 2018-04-10 NOTE — PROGRESS NOTES
Chief Complaint   Patient presents with    Follow-up     asthma    Results     foot ultrasound     Pt here for 2 week follow up with provider king asthma,     And ultrasound results of left foot.

## 2018-04-10 NOTE — PROGRESS NOTES
Lesion is causing some pain, patient feels it is continuing to enlarge. Will send to gen surg for further eval. Discussed with patient in office 04/10/18, she will call the specialist to set up her appt.

## 2018-04-10 NOTE — MR AVS SNAPSHOT
[address] [facility] [address] 
[phone] Patient: Eric Castrejon MRN: [id number] :1960 Visit Information Date & Time Provider Department Dept. Phone Encounter #  
 4/10/2018  1:45 PM Job Burris, [address] [facility] [phone] [id number] Follow-up Instructions Return in about 3 months (around 7/10/2018) for f/u asthma. Your Appointments 2018  9:40 AM  
Follow Up with DO Uzma Olea [facility] [address] [facility]) Appt Note: 6 week f/u headache  
 [address] [facility] [address]  
[phone]  
  
   
 [address] [address] Upcoming Health Maintenance Date Due  
 MEDICARE YEARLY EXAM 2018 BREAST CANCER SCRN MAMMOGRAM 2019 PAP AKA CERVICAL CYTOLOGY 2019 COLONOSCOPY 3/22/2026 DTaP/Tdap/Td series (2 - Td) 2026 Allergies as of 4/10/2018  Review Complete On: 4/10/2018 By: Eric Olvera LPN Severity Noted Reaction Type Reactions Ambien [Zolpidem]  2016    Unknown (comments) Causes Migraine Cephalexin  2016    Rash Morphine  2015    Rash Motrin [Ibuprofen]  2015    Swelling Nortriptyline  2016    Unknown (comments) Bleeding from mouth Pepcid [Famotidine]  2017    Other (comments) Migraines. Protonix [Pantoprazole]  2016    Rash Vicodin [Hydrocodone-acetaminophen]  2016    Unknown (comments) Causes Migraines Vitamins For Infusion  2016    Unknown (comments) Pt stated all vitamins cause her lightheadness and sick Current Immunizations  Reviewed on 3/13/2018 Name Date Influenza High Dose Vaccine PF 8/15/2016 Influenza Vaccine 2015 Influenza Vaccine (Quad) PF 2017 Pneumococcal Vaccine (Unspecified Type) 9/1/2015 Tdap 12/13/2016 Zoster Vaccine, Live 6/13/2017 Not reviewed this visit You Were Diagnosed With   
  
 Codes Comments Mass of left foot    -  Primary ICD-10-CM: R22.42 
ICD-9-CM: 782.2 Moderate persistent asthma without complication     [facility]-09-QL: J45.40 ICD-9-CM: 493.90 Vitals BP Pulse Temp Resp Height(growth percentile) Weight(growth percentile) 129/72 (!) 59 98.1 °F (36.7 °C) (Oral) 19 5' 7\" (1.702 m) 165 lb 11.2 oz (75.2 kg) SpO2 BMI OB Status Smoking Status 98% 25.95 kg/m2 Hysterectomy Never Smoker BMI and BSA Data Body Mass Index Body Surface Area  
 25.95 kg/m 2 1.89 m 2 Preferred Pharmacy Pharmacy Name Phone [facility] [address] Kiki Harper [phone] Your Updated Medication List  
  
   
This list is accurate as of 4/10/18  2:11 PM.  Always use your most recent med list.  
  
  
  
  
 * albuterol 2.5 mg /3 mL (0.083 %) nebulizer solution Commonly known as:  PROVENTIL VENTOLIN  
by Nebulization route once. * albuterol 90 mcg/actuation inhaler Commonly known as:  PROVENTIL HFA, VENTOLIN HFA, PROAIR HFA Take 2 Puffs by inhalation every four (4) hours as needed for Wheezing. * albuterol 2.5 mg /3 mL (0.083 %) nebulizer solution Commonly known as:  PROVENTIL VENTOLIN  
3 mL by Nebulization route every four (4) hours as needed for Wheezing. aspirin 81 mg chewable tablet Take 81 mg by mouth daily. Cetirizine 10 mg Cap Take  by mouth. divalproex  mg tablet Commonly known as:  DEPAKOTE Take 1 Tab by mouth two (2) times a day. DULoxetine 20 mg capsule Commonly known as:  CYMBALTA Take 1 Cap by mouth daily. fluticasone-salmeterol 250-50 mcg/dose diskus inhaler Commonly known as:  ADVAIR Take 1 Puff by inhalation every twelve (12) hours. gabapentin 400 mg capsule Commonly known as:  NEURONTIN  
take 1 capsule by mouth three times a day  
  
 montelukast 10 mg tablet Commonly known as:  SINGULAIR  
take 1 tablet by mouth once daily NAMENDA XR 28 mg capsule Generic drug:  memantine ER Take  by mouth daily. raNITIdine 150 mg tablet Commonly known as:  ZANTAC  
take 1 tablet by mouth twice a day  
  
 simvastatin 20 mg tablet Commonly known as:  ZOCOR Take 1 Tab by mouth nightly. SUMAtriptan 50 mg tablet Commonly known as:  IMITREX Take 1 Tab by mouth once as needed for Migraine for up to 1 dose. topiramate 100 mg tablet Commonly known as:  TOPAMAX Take 1 Tab by mouth two (2) times a day. * Notice: This list has 3 medication(s) that are the same as other medications prescribed for you. Read the directions carefully, and ask your doctor or other care provider to review them with you. We Performed the Following REFERRAL TO GENERAL SURGERY [REF27 Custom] Comments:  
 eval left foot mass Follow-up Instructions Return in about 3 months (around 7/10/2018) for f/u asthma. Referral Information Referral ID Referred By Referred To  
  
 [id number] Kia Locke MD   
   [address] [facility] [address] Phone: [phone] Fax: [phone] Visits Status Start Date End Date 1 New Request 4/10/18 4/10/19 If your referral has a status of pending review or denied, additional information will be sent to support the outcome of this decision. Patient Instructions Controlling Your Asthma: Care Instructions Your Care Instructions Asthma is a long-term condition that affects your breathing. It causes the airways that lead to the lungs to swell. During an asthma attack, the airways swell and narrow. This makes it hard to breathe. You may wheeze or cough. If you have a bad attack, you may need emergency care. There are two things to do to treat asthma. · Control asthma over the long term. · Treat attacks when they occur. You and your doctor can make an asthma action plan. It tells you what medicines you need to take every day to control asthma symptoms and what to do if you have an asthma attack. Your asthma action plan can help prevent and treat attacks. When you keep your asthma under control, you can prevent severe attacks and lasting damage to your airways. You need to treat your asthma even when you are not having symptoms. Although asthma is a lifelong disease, treatment can help control it and help you stay healthy. Follow-up care is a key part of your treatment and safety. Be sure to make and go to all appointments, and call your doctor if you are having problems. It's also a good idea to know your test results and keep a list of the medicines you take. How can you care for yourself at home? To control asthma over the long term Medicines Controller medicines reduce swelling in your lungs. They also prevent asthma attacks. Take your controller medicine exactly as prescribed. Talk to your doctor if you have any problems with your medicine. · Inhaled corticosteroid is a common and effective controller medicine. Using it the right way can prevent or reduce most side effects. · Take your controller medicine every day, not just when you have symptoms. It helps prevent problems before they occur. · Your doctor may prescribe another medicine that you use along with the corticosteroid. This is often a long-acting bronchodilator. Do not take this medicine by itself. Using a long-acting bronchodilator by itself can increase your risk of a severe or fatal asthma attack. · Do not take inhaled corticosteroids or long-acting bronchodilators to stop an asthma attack that has already started. They don't work fast enough to help. · Talk to your doctor before you use other medicines.  Some medicines, such as aspirin, can cause asthma attacks in some people. Education · Learn what triggers an asthma attack. Avoid these triggers when you can. Common triggers include colds, smoke, air pollution, dust, pollen, mold, pets, cockroaches, stress, and cold air. · Check yourself for asthma symptoms to know which step to follow in your action plan. Watch for things like being short of breath, having chest tightness, coughing, and wheezing. Also notice if symptoms wake you up at night or if you get tired quickly when you exercise. · If you have a peak flow meter, use it to check how well you are breathing. It can help you know when an asthma attack is going to occur. Then you can take medicine to prevent the asthma attack or make it less severe. · Do not smoke or allow others to smoke around you. Avoid smoky places. Smoking makes asthma worse. If you need help quitting, talk to your doctor about stop-smoking programs and medicines. These can increase your chances of quitting for good. · Avoid colds and the flu. Get a pneumococcal vaccine shot. If you have had one before, ask your doctor whether you need a second dose. Get a flu vaccine every year, as soon as it's available. If you must be around people with colds or the flu, wash your hands often. To treat attacks when they occur Use your asthma action plan when you have an attack. Your quick-relief medicine will stop an asthma attack. It relaxes the muscles that get tight around the airways. If your doctor prescribed corticosteroid pills to use during an attack, take them as directed. They may take hours to work, but they may shorten the attack and help you breathe better. · Albuterol is an effective quick-relief inhaler. · Take your quick-relief medicine exactly as prescribed. · Always bring your asthma medicine with you when you travel. · You may need to use quick-relief medicine before you exercise. · Call your doctor if you think you are having a problem with your medicine. When should you call for help? Call 911 anytime you think you may need emergency care. For example, call if: 
? · You are having severe trouble breathing. ?Call your doctor now or seek immediate medical care if: 
? · Your symptoms do not get better after you have followed your asthma action plan. ? · You cough up yellow, dark brown, or bloody mucus (sputum). ? Watch closely for changes in your health, and be sure to contact your doctor if: 
? · Your coughing and wheezing get worse. ? · You need to use your quick-relief medicine on more than 2 days a week (unless it is just for exercise). ? · You need help figuring out what is triggering your asthma attacks. Where can you learn more? Go to http://shell-td.info/. Enter L774 in the search box to learn more about \"Controlling Your Asthma: Care Instructions. \" Current as of: May 12, 2017 Content Version: 11.4 © 5169-4630 [employer]. Care instructions adapted under license by [employer] (which disclaims liability or warranty for this information). If you have questions about a medical condition or this instruction, always ask your healthcare professional. [address] any warranty or liability for your use of this information. Introducing [facility]! Uzma Rodney introduces Feesheh patient portal. Now you can access parts of your medical record, email your doctor's office, and request medication refills online. 1. In your internet browser, go to [email or web address] 2. Click on the First Time User? Click Here link in the Sign In box. You will see the New Member Sign Up page. 3. Enter your Feesheh Access Code exactly as it appears below. You will not need to use this code after youve completed the sign-up process.  If you do not sign up before the expiration date, you must request a new code. · nPicker Access Code: [email or web address] Expires: 6/6/2018 10:30 AM 
 
4. Enter the last four digits of your Social Security Number (xxxx) and Date of Birth (mm/dd/yyyy) as indicated and click Submit. You will be taken to the next sign-up page. 5. Create a nPicker ID. This will be your nPicker login ID and cannot be changed, so think of one that is secure and easy to remember. 6. Create a nPicker password. You can change your password at any time. 7. Enter your Password Reset Question and Answer. This can be used at a later time if you forget your password. 8. Enter your e-mail address. You will receive e-mail notification when new information is available in [address] 9. Click Sign Up. You can now view and download portions of your medical record. 10. Click the Download Summary menu link to download a portable copy of your medical information. If you have questions, please visit the Frequently Asked Questions section of the nPicker website. Remember, nPicker is NOT to be used for urgent needs. For medical emergencies, dial 911. Now available from your iPhone and Android! Please provide this summary of care documentation to your next provider. Your primary care clinician is listed as Skylar Valdes. If you have any questions after today's visit, please call [phone].

## 2018-04-14 DIAGNOSIS — K64.9 HEMORRHOIDS, UNSPECIFIED HEMORRHOID TYPE: ICD-10-CM

## 2018-04-15 RX ORDER — HYDROCORTISONE 25 MG/G
CREAM TOPICAL
Qty: 30 G | Refills: 0 | Status: SHIPPED | OUTPATIENT
Start: 2018-04-15 | End: 2018-11-06

## 2018-05-01 ENCOUNTER — OFFICE VISIT (OUTPATIENT)
Dept: NEUROLOGY | Age: 58
End: 2018-05-01

## 2018-05-01 VITALS
HEART RATE: 57 BPM | SYSTOLIC BLOOD PRESSURE: 120 MMHG | OXYGEN SATURATION: 98 % | BODY MASS INDEX: 25.84 KG/M2 | WEIGHT: 165 LBS | RESPIRATION RATE: 18 BRPM | DIASTOLIC BLOOD PRESSURE: 68 MMHG

## 2018-05-01 DIAGNOSIS — F32.A DEPRESSION, UNSPECIFIED DEPRESSION TYPE: ICD-10-CM

## 2018-05-01 DIAGNOSIS — R51.9 CHRONIC DAILY HEADACHE: ICD-10-CM

## 2018-05-01 DIAGNOSIS — G43.009 MIGRAINE WITHOUT AURA AND WITHOUT STATUS MIGRAINOSUS, NOT INTRACTABLE: Primary | ICD-10-CM

## 2018-05-01 RX ORDER — DULOXETIN HYDROCHLORIDE 20 MG/1
20 CAPSULE, DELAYED RELEASE ORAL DAILY
Qty: 30 CAP | Refills: 2 | Status: SHIPPED | OUTPATIENT
Start: 2018-05-01 | End: 2018-08-07 | Stop reason: SDUPTHER

## 2018-05-01 RX ORDER — TOPIRAMATE 100 MG/1
100 TABLET, FILM COATED ORAL 2 TIMES DAILY
Qty: 60 TAB | Refills: 2 | Status: SHIPPED | OUTPATIENT
Start: 2018-05-01 | End: 2018-08-07 | Stop reason: SDUPTHER

## 2018-05-01 RX ORDER — DIVALPROEX SODIUM 500 MG/1
500 TABLET, DELAYED RELEASE ORAL 2 TIMES DAILY
Qty: 60 TAB | Refills: 2 | Status: SHIPPED | OUTPATIENT
Start: 2018-05-01 | End: 2018-08-07 | Stop reason: SDUPTHER

## 2018-05-01 NOTE — PROGRESS NOTES
Neurology Clinic Follow up Note    Patient ID:  Kristen Velazquez  [id number]  48 y.o.  1960      Ms. Nasra Stark is here for follow up today of migraines. Last Appointment With Me:  3/8/18      Interval History:   Headaches severity is improved. No severe migraines migraines since last visit. Less severe daily headaches located over the L hemisphere, no associated N/V or photophobia, lasting 1-2 hours at most. She is treating less severe headaches 3x weekly with Tylenol and/or Imitrex. She remains on TPM 100mg BID and VPA 500mg BID and Cymbalta 20mg/d. No reported side effects. PMHx/ PSHx/ FHx/ SHx:  Reviewed and unchanged previous visit. Past Medical History:   Diagnosis Date    Anal fistula     Asthma     Back ache     CAD (coronary artery disease)     leaky valves    Cholesterol blood decreased     Dementia     Depression     Deviated septum     Diabetes (HCC)     pre diabetese    Fibromyalgia     Frequent headaches     Heart valve disease     Herniated nucleus pulposus, L5-S1     Migraine     Protein S deficiency (HCC)     Pulmonary emboli (HCC)     Pulmonary hypertension (HCC)     Restless leg syndrome     RLS (restless legs syndrome)     Sickle cell trait (HCC)     Sleep apnea     Stroke (HCC)     Upper airway resistance syndrome     Vaginal fistula          ROS:  Comprehensive review of systems negative except for as noted above. Objective:       Meds:  Current Outpatient Prescriptions   Medication Sig Dispense Refill    hydrocortisone (ANUSOL-HC) 2.5 % rectal cream INSERT INTO RECTUM four times a day 30 g 0    raNITIdine (ZANTAC) 150 mg tablet take 1 tablet by mouth twice a day 180 Tab 1    fluticasone-salmeterol (ADVAIR) 250-50 mcg/dose diskus inhaler Take 1 Puff by inhalation every twelve (12) hours. 1 Inhaler 5    albuterol (PROVENTIL VENTOLIN) 2.5 mg /3 mL (0.083 %) nebulizer solution 3 mL by Nebulization route every four (4) hours as needed for Wheezing. 25 Each 2    simvastatin (ZOCOR) 20 mg tablet Take 1 Tab by mouth nightly. 90 Tab 1    albuterol (PROVENTIL HFA, VENTOLIN HFA, PROAIR HFA) 90 mcg/actuation inhaler Take 2 Puffs by inhalation every four (4) hours as needed for Wheezing. 1 Inhaler 3    memantine ER (NAMENDA XR) 28 mg capsule Take  by mouth daily.  DULoxetine (CYMBALTA) 20 mg capsule Take 1 Cap by mouth daily. 30 Cap 2    SUMAtriptan (IMITREX) 50 mg tablet Take 1 Tab by mouth once as needed for Migraine for up to 1 dose. 9 Tab 2    gabapentin (NEURONTIN) 400 mg capsule take 1 capsule by mouth three times a day 270 Cap 1    topiramate (TOPAMAX) 100 mg tablet Take 1 Tab by mouth two (2) times a day. 60 Tab 3    divalproex DR (DEPAKOTE) 500 mg tablet Take 1 Tab by mouth two (2) times a day. 60 Tab 3    montelukast (SINGULAIR) 10 mg tablet take 1 tablet by mouth once daily 30 Tab 11    aspirin 81 mg chewable tablet Take 81 mg by mouth daily.  Cetirizine 10 mg cap Take  by mouth.  albuterol (PROVENTIL VENTOLIN) 2.5 mg /3 mL (0.083 %) nebulizer solution by Nebulization route once. Exam:  Visit Vitals    /68    Pulse (!) 57    Resp 18    Wt 74.8 kg (165 lb)    SpO2 98%    BMI 25.84 kg/m2     NEUROLOGICAL EXAM:  General: Awake, alert, speech fluent. CN: PERRL, EOMI without nystagmus, VFF to confrontation, facial sensation and strength are normal and symmetric, hearing is intact to finger rub bilaterally, palate and tongue movements are intact and symmetric. Motor: Normal tone, bulk and strength bilaterally. Reflexes: 2/4 and symmetric, plantar stimulation is flexor. Coordination: FNF, MAMADOU, HTS intact. Sensation: LT intact throughout. Gait: Normal-based and steady.       LABS  Results for orders placed or performed in visit on 03/16/18   AMB POC RAPID INFLUENZA TEST   Result Value Ref Range    VALID INTERNAL CONTROL POC Yes     QuickVue Influenza test Negative Negative   AMB POC RAPID STREP A   Result Value Ref Range    VALID INTERNAL CONTROL POC Yes     Group A Strep Ag Negative Negative     Lab Results   Component Value Date/Time    Sodium 144 12/11/2017 01:44 PM    Potassium 4.4 12/11/2017 01:44 PM    Chloride 105 12/11/2017 01:44 PM    CO2 24 12/11/2017 01:44 PM    Glucose 78 12/11/2017 01:44 PM    BUN 19 12/11/2017 01:44 PM    Creatinine 0.80 12/11/2017 01:44 PM    BUN/Creatinine ratio 24 (H) 12/11/2017 01:44 PM    GFR est AA 96 12/11/2017 01:44 PM    GFR est non-AA 83 12/11/2017 01:44 PM    Calcium 9.1 12/11/2017 01:44 PM    Bilirubin, total <0.2 12/11/2017 01:44 PM    AST (SGOT) 13 12/11/2017 01:44 PM    Alk. phosphatase 76 12/11/2017 01:44 PM    Protein, total 6.8 12/11/2017 01:44 PM    Albumin 4.3 12/11/2017 01:44 PM    A-G Ratio 1.7 12/11/2017 01:44 PM    ALT (SGPT) 15 12/11/2017 01:44 PM       IMAGING:  MRI Results (most recent):  No results found for this or any previous visit. Assessment:     Encounter Diagnoses     ICD-10-CM ICD-9-CM   1. Migraine without aura and without status migrainosus, not intractable G43.009 346.10   2. Chronic daily headache R51 784.0   3. Depression, unspecified depression type F32.9 32      56year old AAF with a h/o depression, fibromyalgia here for f/u of chronic migraines since age 17, L hemispheric refractory to multiple preventative medications. She has failed TCA therapy in the past due to side effects. TPM and VPA for migraine ppx appeared to be effective initially, however she experienced increasing frequency of migraines after aminta a viral illness in [place]. HAs were exacerbated by daily use of abortive therapy for several weeks likely causing some degree of rebound headache as well. Migraine severity/frequency much improved with addition of Cymbalta. No reported side effects with current preventatives. Headache education  Discussed treatment options, both abortive and preventive medications.    Instructed patient about medications and potential side effects. Discussed medication overuse headache and to limit use of analgesics to less than 2 doses per week. Plan:   Cont. Cymbalta 20mg, TPM 100mg BID and VPA 500mg BID for HA ppx. Refills provided today. Imitrex 50mg PRN for abortive migraine tx, limit use to no more than twice weekly. May use tylenol sparingly for less severe headaches. Follow-up Disposition:  Return in about 3 months (around 8/1/2018).       Signed:  Casey Kinney DO  5/1/2018

## 2018-05-09 ENCOUNTER — TELEPHONE (OUTPATIENT)
Dept: FAMILY MEDICINE CLINIC | Age: 58
End: 2018-05-09

## 2018-05-09 NOTE — TELEPHONE ENCOUNTER
----- Message from Jude Thompson LPN sent at 5/5/1376  9:09 AM EDT -----  Regarding: FW: Dr. Akhil Drew      ----- Message -----     From: Omar Major: 5/9/2018   6:58 AM       To: Jude Thompson LPN  Subject: FW: Dr. Akhil Drew                              ----- Message -----     From: Marleny Martinez     Sent: 5/8/2018  11:55 AM       To: [facility] Front Office  Subject: Dr. Akhil Drew                              Pt stated that she has not seen her surgeon yet because she is waiting for her medicaid to be accepted.     Best contact number [phone]

## 2018-05-16 ENCOUNTER — TELEPHONE (OUTPATIENT)
Dept: FAMILY MEDICINE CLINIC | Age: 58
End: 2018-05-16

## 2018-05-31 ENCOUNTER — OFFICE VISIT (OUTPATIENT)
Dept: FAMILY MEDICINE CLINIC | Age: 58
End: 2018-05-31

## 2018-05-31 VITALS
HEART RATE: 64 BPM | WEIGHT: 165 LBS | OXYGEN SATURATION: 97 % | SYSTOLIC BLOOD PRESSURE: 138 MMHG | DIASTOLIC BLOOD PRESSURE: 76 MMHG | RESPIRATION RATE: 18 BRPM | TEMPERATURE: 97.8 F | BODY MASS INDEX: 25.9 KG/M2 | HEIGHT: 67 IN

## 2018-05-31 DIAGNOSIS — R31.9 HEMATURIA, UNSPECIFIED TYPE: ICD-10-CM

## 2018-05-31 DIAGNOSIS — N89.8 VAGINAL ITCHING: ICD-10-CM

## 2018-05-31 DIAGNOSIS — N89.8 VAGINAL DISCHARGE: Primary | ICD-10-CM

## 2018-05-31 LAB
BILIRUB UR QL STRIP: NEGATIVE
GLUCOSE UR-MCNC: NEGATIVE MG/DL
KETONES P FAST UR STRIP-MCNC: NEGATIVE MG/DL
PH UR STRIP: 7 [PH] (ref 4.6–8)
PROT UR QL STRIP: NEGATIVE
SP GR UR STRIP: 1.02 (ref 1–1.03)
UA UROBILINOGEN AMB POC: NORMAL (ref 0.2–1)
URINALYSIS CLARITY POC: CLEAR
URINALYSIS COLOR POC: YELLOW
URINE BLOOD POC: NORMAL
URINE LEUKOCYTES POC: NEGATIVE
URINE NITRITES POC: NEGATIVE

## 2018-05-31 NOTE — PROGRESS NOTES
Chief Complaint   Patient presents with    Vaginal Discharge     yellowish colored    Vaginal Itching     she is a 56y.o. year old female who presents for evalution. 2 weeks of vaginal discharge and itch  Thee is no odor  She is single w one sex partner  New sex partner for 1 month  Reviewed PmHx, RxHx, FmHx, SocHx, AllgHx and updated and dated in the chart. Aspirin yes ____   No____ N/A____    Patient Active Problem List    Diagnosis    Fibromyalgia    Bilateral carpal tunnel syndrome    Rectal vaginal fistula    Valvular disease    Depression     She sees a psychiatrist she says      Prediabetes    Seasonal allergic rhinitis    Mild intermittent asthma without complication    Unroofed coronary sinus    Pulmonary embolism ([facility] [address]       Nurse notes were reviewed and copied and are correct  Review of Systems - negative except as listed above in the HPI    Objective:     Vitals:    05/31/18 1602   BP: 138/76   Pulse: 64   Resp: 18   Temp: 97.8 °F (36.6 °C)   TempSrc: Oral   SpO2: 97%   Weight: 165 lb (74.8 kg)   Height: 5' 7\" (1.702 m)       Physical Examination: General appearance - alert, well appearing, and in no distress  Mental status - alert, oriented to person, place, and time  Abdomen - soft, nontender, nondistended, no masses or organomegaly    Results for orders placed or performed in visit on 05/31/18   AMB POC URINALYSIS DIP STICK AUTO W/O MICRO   Result Value Ref Range    Color (UA POC) Yellow     Clarity (UA POC) Clear     Glucose (UA POC) Negative Negative    Bilirubin (UA POC) Negative Negative    Ketones (UA POC) Negative Negative    Specific gravity (UA POC) 1.020 1.001 - 1.035    Blood (UA POC) Trace Negative    pH (UA POC) 7.0 4.6 - 8.0    Protein (UA POC) Negative Negative    Urobilinogen (UA POC) 0.2 mg/dL 0.2 - 1    Nitrites (UA POC) Negative Negative    Leukocyte esterase (UA POC) Negative Negative       Assessment/ Plan:   Diagnoses and all orders for this visit:    1.  Vaginal discharge  -     AMB POC URINALYSIS DIP STICK AUTO W/O MICRO  -     CT/NG/T.VAGINALIS AMPLIFICATION  -     CULTURE, URINE    2. Vaginal itching  -     AMB POC URINALYSIS DIP STICK AUTO W/O MICRO  -     CT/NG/T.VAGINALIS AMPLIFICATION    3. Hematuria, unspecified type  -     CULTURE, URINE       Follow-up Disposition:  Return if symptoms worsen or fail to improve. ICD-10-CM ICD-9-CM    1. Vaginal discharge N89.8 623.5 AMB POC URINALYSIS DIP STICK AUTO W/O MICRO      CT/NG/T.VAGINALIS AMPLIFICATION      CULTURE, URINE   2. Vaginal itching L29.8 698.1 AMB POC URINALYSIS DIP STICK AUTO W/O MICRO      CT/NG/T.VAGINALIS AMPLIFICATION   3. Hematuria, unspecified type R31.9 599.70 CULTURE, URINE       I have discussed the diagnosis with the patient and the intended plan as seen in the above orders. The patient has received an after-visit summary and questions were answered concerning future plans. Medication Side Effects and Warnings were discussed with patient: yes  Patient Labs were reviewed and or requested: yes  Patient Past Records were reviewed and or requested: yes        There are no Patient Instructions on file for this visit.     The patient verbalizes understanding and agrees with the plan of care        Patient has the advanced directives booklet to review

## 2018-05-31 NOTE — MR AVS SNAPSHOT
[address] [address] 
[phone] Patient: Rosie Ennis MRN: [id number] :1960 Visit Information Date & Time Provider Department Dept. Phone Encounter #  
 2018  4:15 PM Eduar Martines MD [address] [place] [address] [id number] Follow-up Instructions Return if symptoms worsen or fail to improve. Your Appointments 7/10/2018 10:00 AM  
ROUTINE [place] [address] [facility] ([address]) Appt Note: 3 MO F/U  
 [address] 
[facility] [address]  
[place] [address]  
  
    
 2018  9:40 AM  
Follow Up with DO Ashwin Xiao [facility] at [address]) Appt Note: 3 month f/u headache  
 [address] [facility] [address]  
[phone]  
  
   
 [address] [address] Upcoming Health Maintenance Date Due Influenza Age 5 to Adult 2018 MEDICARE YEARLY EXAM 2018 BREAST CANCER SCRN MAMMOGRAM 2019 PAP AKA CERVICAL CYTOLOGY 2019 COLONOSCOPY 3/22/2026 DTaP/Tdap/Td series (2 - Td) 2026 Allergies as of 2018  Review Complete On: 2018 By: Kiran Aviles LPN Severity Noted Reaction Type Reactions Ambien [Zolpidem]  2016    Unknown (comments) Causes Migraine Cephalexin  2016    Rash Morphine  2015    Rash Motrin [Ibuprofen]  2015    Swelling Nortriptyline  2016    Unknown (comments) Bleeding from mouth Pepcid [Famotidine]  2017    Other (comments) Migraines. Protonix [Pantoprazole]  2016    Rash Vicodin [Hydrocodone-acetaminophen]  2016    Unknown (comments) Causes Migraines Vitamins For Infusion  09/09/2016    Unknown (comments) Pt stated all vitamins cause her lightheadness and sick Current Immunizations  Reviewed on 3/13/2018 Name Date Influenza High Dose Vaccine PF 8/15/2016 Influenza Vaccine 9/1/2015 Influenza Vaccine (Quad) PF 8/1/2017 Pneumococcal Vaccine (Unspecified Type) 9/1/2015 Tdap 12/13/2016 Zoster Vaccine, Live 6/13/2017 Not reviewed this visit You Were Diagnosed With   
  
 Codes Comments Vaginal discharge    -  Primary ICD-10-CM: N89.8 ICD-9-CM: 623.5 Vaginal itching     ICD-10-CM: L29.8 ICD-9-CM: 698.1 Hematuria, unspecified type     ICD-10-CM: R31.9 ICD-9-CM: 599.70 Vitals BP Pulse Temp Resp Height(growth percentile) Weight(growth percentile) 138/76 64 97.8 °F (36.6 °C) (Oral) 18 5' 7\" (1.702 m) 165 lb (74.8 kg) SpO2 BMI OB Status Smoking Status 97% 25.84 kg/m2 Hysterectomy Never Smoker Vitals History BMI and BSA Data Body Mass Index Body Surface Area  
 25.84 kg/m 2 1.88 m 2 Preferred Pharmacy Pharmacy Name Phone [facility] [address] [facility] [phone] Your Updated Medication List  
  
   
This list is accurate as of 5/31/18  4:22 PM.  Always use your most recent med list.  
  
  
  
  
 * albuterol 2.5 mg /3 mL (0.083 %) nebulizer solution Commonly known as:  PROVENTIL VENTOLIN  
by Nebulization route once. * albuterol 90 mcg/actuation inhaler Commonly known as:  PROVENTIL HFA, VENTOLIN HFA, PROAIR HFA Take 2 Puffs by inhalation every four (4) hours as needed for Wheezing. * albuterol 2.5 mg /3 mL (0.083 %) nebulizer solution Commonly known as:  PROVENTIL VENTOLIN  
3 mL by Nebulization route every four (4) hours as needed for Wheezing. aspirin 81 mg chewable tablet Take 81 mg by mouth daily. Cetirizine 10 mg Cap Take  by mouth. divalproex  mg tablet Commonly known as:  DEPAKOTE Take 1 Tab by mouth two (2) times a day. DULoxetine 20 mg capsule Commonly known as:  CYMBALTA Take 1 Cap by mouth daily. fluticasone-salmeterol 250-50 mcg/dose diskus inhaler Commonly known as:  ADVAIR Take 1 Puff by inhalation every twelve (12) hours. gabapentin 400 mg capsule Commonly known as:  NEURONTIN  
take 1 capsule by mouth three times a day  
  
 hydrocortisone 2.5 % rectal cream  
Commonly known as:  ANUSOL-HC INSERT INTO RECTUM four times a day  
  
 montelukast 10 mg tablet Commonly known as:  SINGULAIR  
take 1 tablet by mouth once daily NAMENDA XR 28 mg capsule Generic drug:  memantine ER Take  by mouth daily. raNITIdine 150 mg tablet Commonly known as:  ZANTAC  
take 1 tablet by mouth twice a day  
  
 simvastatin 20 mg tablet Commonly known as:  ZOCOR Take 1 Tab by mouth nightly. SUMAtriptan 50 mg tablet Commonly known as:  IMITREX Take 1 Tab by mouth once as needed for Migraine for up to 1 dose. topiramate 100 mg tablet Commonly known as:  TOPAMAX Take 1 Tab by mouth two (2) times a day. * Notice: This list has 3 medication(s) that are the same as other medications prescribed for you. Read the directions carefully, and ask your doctor or other care provider to review them with you. We Performed the Following AMB POC URINALYSIS DIP STICK AUTO W/O MICRO [16717 CPT(R)] CT/NG/T.VAGINALIS AMPLIFICATION [id number] CPT(R)] CULTURE, URINE [id number] CPT(R)] Follow-up Instructions Return if symptoms worsen or fail to improve. Introducing [facility]! [facility] introduces AroundWire patient portal. Now you can access parts of your medical record, email your doctor's office, and request medication refills online. 1. In your internet browser, go to [email or web address]/[address] 2. Click on the First Time User? Click Here link in the Sign In box.  You will see the New Member Sign Up page. 3. Enter your Hitsbook Access Code exactly as it appears below. You will not need to use this code after youve completed the sign-up process. If you do not sign up before the expiration date, you must request a new code. · Hitsbook Access Code: [email or web address] Expires: 6/6/2018 10:30 AM 
 
4. Enter the last four digits of your Social Security Number (xxxx) and Date of Birth (mm/dd/yyyy) as indicated and click Submit. You will be taken to the next sign-up page. 5. Create a [employer] ID. This will be your Hitsbook login ID and cannot be changed, so think of one that is secure and easy to remember. 6. Create a Hitsbook password. You can change your password at any time. 7. Enter your Password Reset Question and Answer. This can be used at a later time if you forget your password. 8. Enter your e-mail address. You will receive e-mail notification when new information is available in [address] 9. Click Sign Up. You can now view and download portions of your medical record. 10. Click the Download Summary menu link to download a portable copy of your medical information. If you have questions, please visit the Frequently Asked Questions section of the Hitsbook website. Remember, Hitsbook is NOT to be used for urgent needs. For medical emergencies, dial 911. Now available from your iPhone and Android! Please provide this summary of care documentation to your next provider. Your primary care clinician is listed as Liz Barboza. If you have any questions after today's visit, please call [phone].

## 2018-05-31 NOTE — PROGRESS NOTES
1. Have you been to the ER, urgent care clinic since your last visit? Hospitalized since your last visit? No    2. Have you seen or consulted any other health care providers outside of the [facility] since your last visit? Include any pap smears or colon screening.  No     Chief Complaint   Patient presents with    Vaginal Discharge     yellowish colored    Vaginal Itching

## 2018-06-02 LAB
BACTERIA UR CULT: NO GROWTH
C TRACH RRNA SPEC QL NAA+PROBE: NEGATIVE
N GONORRHOEA RRNA SPEC QL NAA+PROBE: NEGATIVE
T VAGINALIS RRNA SPEC QL NAA+PROBE: NEGATIVE

## 2018-06-19 ENCOUNTER — HOSPITAL ENCOUNTER (OUTPATIENT)
Dept: MAMMOGRAPHY | Age: 58
Discharge: HOME | End: 2018-06-19
Attending: FAMILY MEDICINE
Payer: MEDICARE

## 2018-06-19 DIAGNOSIS — Z12.31 VISIT FOR SCREENING MAMMOGRAM: ICD-10-CM

## 2018-06-19 PROCEDURE — 77063 BREAST TOMOSYNTHESIS BI: CPT

## 2018-07-07 DIAGNOSIS — R09.82 POST-NASAL DRAINAGE: ICD-10-CM

## 2018-07-07 RX ORDER — FLUTICASONE PROPIONATE 50 MCG
SPRAY, SUSPENSION (ML) NASAL
Qty: 16 G | Refills: 3 | Status: SHIPPED | OUTPATIENT
Start: 2018-07-07 | End: 2018-10-09 | Stop reason: SDUPTHER

## 2018-07-10 ENCOUNTER — OFFICE VISIT (OUTPATIENT)
Dept: FAMILY MEDICINE CLINIC | Age: 58
End: 2018-07-10

## 2018-07-10 VITALS
OXYGEN SATURATION: 98 % | BODY MASS INDEX: 27.84 KG/M2 | SYSTOLIC BLOOD PRESSURE: 125 MMHG | RESPIRATION RATE: 18 BRPM | WEIGHT: 177.4 LBS | TEMPERATURE: 98.4 F | HEART RATE: 76 BPM | HEIGHT: 67 IN | DIASTOLIC BLOOD PRESSURE: 79 MMHG

## 2018-07-10 DIAGNOSIS — M77.50 TENDONITIS OF FOOT: Primary | ICD-10-CM

## 2018-07-10 DIAGNOSIS — M79.7 FIBROMYALGIA: ICD-10-CM

## 2018-07-10 RX ORDER — DICLOFENAC SODIUM 75 MG/1
TABLET, DELAYED RELEASE ORAL
Refills: 0 | COMMUNITY
Start: 2018-07-07 | End: 2018-10-09

## 2018-07-10 NOTE — PROGRESS NOTES
Chief Complaint   Patient presents with    Follow-up     mass on left foot     Pt here for follow up with provider for mass and pain to left foot. Pt states she did see foot specialist as ordered. Pt was treated with injection and pain meds. Pt states she was DX with tendonitis of foot.

## 2018-07-23 NOTE — PROGRESS NOTES
Rachel Mueller is a 56 y.o. female who presents with the following complaints:  Chief Complaint   Patient presents with    Follow-up     mass on left foot       Subjective:    HPI:   Left foot pain and mass have improved following steroid injection by podiatrist for tendonitis. She is wearing supportive footware to prevent recurrence. Reports fibromyalgia symptoms are stable, good compliance with cymbalta and gabapentin. Tolerating normal activity.     Pertinent PMH/FH/SH:  Past Medical History:   Diagnosis Date    Anal fistula     Asthma     Back ache     CAD (coronary artery disease)     leaky valves    Cholesterol blood decreased     Dementia     Depression     Deviated septum     Diabetes (HCC)     pre diabetese    Fibromyalgia     Frequent headaches     Heart valve disease     Herniated nucleus pulposus, L5-S1     Migraine     Protein S deficiency (HCC)     Pulmonary emboli (HCC)     Pulmonary hypertension (HCC)     Restless leg syndrome     RLS (restless legs syndrome)     Sickle cell trait (HCC)     Sleep apnea     Stroke (HCC)     Upper airway resistance syndrome     Vaginal fistula      Past Surgical History:   Procedure Laterality Date    CARDIAC SURG PROCEDURE UNLIST  9/23/99    closure of unroofed coronary sinus    HX ROTATOR CUFF REPAIR      3/2017     Family History   Problem Relation Age of Onset    Diabetes Mother     Heart Disease Mother     Hypertension Mother     Sickle Cell Anemia Sister     Hypertension Brother     Stroke Maternal Aunt      Social History     Social History    Marital status: SINGLE     Spouse name: N/A    Number of children: N/A    Years of education: N/A     Social History Main Topics    Smoking status: Never Smoker    Smokeless tobacco: Never Used    Alcohol use No    Drug use: No    Sexual activity: Not Currently     Other Topics Concern    None     Social History Narrative     Advanced Directives: N      Patient Active Problem List    Diagnosis    Fibromyalgia    Bilateral carpal tunnel syndrome    Rectal vaginal fistula    Valvular disease    Depression     She sees a psychiatrist she says      Prediabetes    Seasonal allergic rhinitis    Mild intermittent asthma without complication    Unroofed coronary sinus    Pulmonary embolism ([facility] [address]       Nurse notes were reviewed and are correct  Review of Systems - negative except as listed above in the HPI    Objective:     Vitals:    07/10/18 1022   BP: 125/79   Pulse: 76   Resp: 18   Temp: 98.4 °F (36.9 °C)   TempSrc: Oral   SpO2: 98%   Weight: 177 lb 6.4 oz (80.5 kg)   Height: 5' 7\" (1.702 m)     Physical Examination: General appearance - alert, well appearing, and in no distress, oriented to person, place, and time, overweight and well hydrated  Mental status - normal mood, behavior, speech, dress, motor activity, and thought processes  Neck - supple, no significant adenopathy  Chest - clear to auscultation, no wheezes, rales or rhonchi, symmetric air entry  Heart - normal rate, regular rhythm, normal S1, S2, no murmurs, rubs, clicks or gallops, normal bilateral carotid upstroke without bruits, no JVD  Abdomen - soft, nontender, nondistended, no masses or organomegaly  bowel sounds normal  Neurological - alert, oriented, normal speech, no focal findings or movement disorder noted  Extremities - no pedal edema noted, intact peripheral pulses  Skin - normal coloration and turgor, no rashes, no suspicious skin lesions noted    Assessment/ Plan:   Diagnoses and all orders for this visit:    1. Tendonitis of foot  Good response to steroid injection    2. Fibromyalgia  Continue gabapentin, cymbalta     Follow-up Disposition:  Return in about 3 months (around 10/10/2018) for fibromyalgia. I have discussed the diagnosis with the patient and the intended plan as seen in the above orders. The patient has received an after-visit summary and questions were answered concerning future plans. The patient verbalizes understanding.     Medication Side Effects and Warnings were discussed with patient: yes  Patient Labs were reviewed and or requested: yes  Patient Past Records were reviewed and or requested: yes          Willard JSOEPH

## 2018-08-07 ENCOUNTER — OFFICE VISIT (OUTPATIENT)
Dept: NEUROLOGY | Age: 58
End: 2018-08-07

## 2018-08-07 VITALS
DIASTOLIC BLOOD PRESSURE: 74 MMHG | BODY MASS INDEX: 28.04 KG/M2 | SYSTOLIC BLOOD PRESSURE: 118 MMHG | HEART RATE: 75 BPM | OXYGEN SATURATION: 98 % | RESPIRATION RATE: 18 BRPM | WEIGHT: 179 LBS

## 2018-08-07 DIAGNOSIS — M79.7 FIBROMYALGIA: ICD-10-CM

## 2018-08-07 DIAGNOSIS — G43.009 MIGRAINE WITHOUT AURA AND WITHOUT STATUS MIGRAINOSUS, NOT INTRACTABLE: Primary | ICD-10-CM

## 2018-08-07 DIAGNOSIS — F32.A DEPRESSION, UNSPECIFIED DEPRESSION TYPE: ICD-10-CM

## 2018-08-07 RX ORDER — DIVALPROEX SODIUM 500 MG/1
500 TABLET, DELAYED RELEASE ORAL 2 TIMES DAILY
Qty: 60 TAB | Refills: 2 | Status: SHIPPED | OUTPATIENT
Start: 2018-08-07 | End: 2018-10-09 | Stop reason: SDUPTHER

## 2018-08-07 RX ORDER — DULOXETIN HYDROCHLORIDE 20 MG/1
40 CAPSULE, DELAYED RELEASE ORAL DAILY
Qty: 60 CAP | Refills: 2 | Status: SHIPPED | OUTPATIENT
Start: 2018-08-07 | End: 2018-10-09 | Stop reason: SDUPTHER

## 2018-08-07 RX ORDER — ACETAMINOPHEN 325 MG/1
TABLET ORAL
COMMUNITY

## 2018-08-07 RX ORDER — TOPIRAMATE 100 MG/1
100 TABLET, FILM COATED ORAL 2 TIMES DAILY
Qty: 60 TAB | Refills: 2 | Status: SHIPPED | OUTPATIENT
Start: 2018-08-07 | End: 2018-10-09 | Stop reason: SDUPTHER

## 2018-08-07 RX ORDER — SUMATRIPTAN 50 MG/1
50 TABLET, FILM COATED ORAL
Qty: 9 TAB | Refills: 2 | Status: SHIPPED | OUTPATIENT
Start: 2018-08-07 | End: 2018-08-07

## 2018-08-07 NOTE — PROGRESS NOTES
Neurology Clinic Follow up Note    Patient ID:  Juan Chavez  [id number]  89 y.o.  1960      Ms. Joss Denis is here for follow up today of migraines. Last Appointment With Me:  5/1/18      Interval History:   Pt reports she is in the process of moving which has caused some additional stress related headaches recently. Migraines are doing well- reports good relief with Imitrex. Denies recurrence of severe migraines in several months. Reports less severe non-migrainous headaches 2-3x weekly located over L temple, lasting 2-3 hours no nausea/vomiting or photophobia. She is using Motrin for rescue tx of her less severe headaches. She remains on TPM 100mg BID and VPA 500mg BID and Cymbalta 20mg/d. No reported side effects. She reports her fibromyalgia-related pain is slightly worse. PMHx/ PSHx/ FHx/ SHx:  Reviewed and unchanged previous visit. Past Medical History:   Diagnosis Date    Anal fistula     Asthma     Back ache     CAD (coronary artery disease)     leaky valves    Cholesterol blood decreased     Dementia     Depression     Deviated septum     Diabetes (HCC)     pre diabetese    Fibromyalgia     Frequent headaches     Heart valve disease     Herniated nucleus pulposus, L5-S1     Migraine     Protein S deficiency (HCC)     Pulmonary emboli (HCC)     Pulmonary hypertension (HCC)     Restless leg syndrome     RLS (restless legs syndrome)     Sickle cell trait (HCC)     Sleep apnea     Stroke (HCC)     Upper airway resistance syndrome     Vaginal fistula          ROS:  Comprehensive review of systems negative except for as noted above. Objective:       Meds:  Current Outpatient Prescriptions   Medication Sig Dispense Refill    diclofenac EC (VOLTAREN) 75 mg EC tablet   0    fluticasone (FLONASE) 50 mcg/actuation nasal spray instill 2 sprays into each nostril once daily 16 g 3    DULoxetine (CYMBALTA) 20 mg capsule Take 1 Cap by mouth daily.  30 Cap 2    topiramate (TOPAMAX) 100 mg tablet Take 1 Tab by mouth two (2) times a day. 60 Tab 2    divalproex DR (DEPAKOTE) 500 mg tablet Take 1 Tab by mouth two (2) times a day. 60 Tab 2    hydrocortisone (ANUSOL-HC) 2.5 % rectal cream INSERT INTO RECTUM four times a day 30 g 0    raNITIdine (ZANTAC) 150 mg tablet take 1 tablet by mouth twice a day 180 Tab 1    fluticasone-salmeterol (ADVAIR) 250-50 mcg/dose diskus inhaler Take 1 Puff by inhalation every twelve (12) hours. 1 Inhaler 5    albuterol (PROVENTIL VENTOLIN) 2.5 mg /3 mL (0.083 %) nebulizer solution 3 mL by Nebulization route every four (4) hours as needed for Wheezing. 25 Each 2    simvastatin (ZOCOR) 20 mg tablet Take 1 Tab by mouth nightly. 90 Tab 1    albuterol (PROVENTIL HFA, VENTOLIN HFA, PROAIR HFA) 90 mcg/actuation inhaler Take 2 Puffs by inhalation every four (4) hours as needed for Wheezing. 1 Inhaler 3    memantine ER (NAMENDA XR) 28 mg capsule Take  by mouth daily.  SUMAtriptan (IMITREX) 50 mg tablet Take 1 Tab by mouth once as needed for Migraine for up to 1 dose. 9 Tab 2    gabapentin (NEURONTIN) 400 mg capsule take 1 capsule by mouth three times a day 270 Cap 1    montelukast (SINGULAIR) 10 mg tablet take 1 tablet by mouth once daily 30 Tab 11    aspirin 81 mg chewable tablet Take 81 mg by mouth daily.  Cetirizine 10 mg cap Take  by mouth.  albuterol (PROVENTIL VENTOLIN) 2.5 mg /3 mL (0.083 %) nebulizer solution by Nebulization route once. Exam:  Visit Vitals    /74    Pulse 75    Resp 18    Wt 81.2 kg (179 lb)    SpO2 98%    BMI 28.04 kg/m2     NEUROLOGICAL EXAM:  General: Awake, alert, speech fluent. CN: PERRL, EOMI without nystagmus, VFF to confrontation, facial sensation and strength are normal and symmetric, hearing is intact to finger rub bilaterally, palate and tongue movements are intact and symmetric. Motor: Normal tone, bulk and strength bilaterally.   Reflexes: 2/4 and symmetric, plantar stimulation is flexor. Coordination: FNF, MAMADOU, HTS intact. Sensation: LT intact throughout. Gait: Normal-based and steady. LABS  Results for orders placed or performed in visit on 05/31/18   CULTURE, URINE   Result Value Ref Range    Urine Culture, Routine No growth    CT/NG/T.VAGINALIS AMPLIFICATION   Result Value Ref Range    C. trachomatis by ANABEL Negative Negative    N. gonorrhoeae by ANABEL Negative Negative    T. vaginalis by ANABEL Negative Negative   AMB POC URINALYSIS DIP STICK AUTO W/O MICRO   Result Value Ref Range    Color (UA POC) Yellow     Clarity (UA POC) Clear     Glucose (UA POC) Negative Negative    Bilirubin (UA POC) Negative Negative    Ketones (UA POC) Negative Negative    Specific gravity (UA POC) 1.020 1.001 - 1.035    Blood (UA POC) Trace Negative    pH (UA POC) 7.0 4.6 - 8.0    Protein (UA POC) Negative Negative    Urobilinogen (UA POC) 0.2 mg/dL 0.2 - 1    Nitrites (UA POC) Negative Negative    Leukocyte esterase (UA POC) Negative Negative     Lab Results   Component Value Date/Time    Sodium 144 12/11/2017 01:44 PM    Potassium 4.4 12/11/2017 01:44 PM    Chloride 105 12/11/2017 01:44 PM    CO2 24 12/11/2017 01:44 PM    Glucose 78 12/11/2017 01:44 PM    BUN 19 12/11/2017 01:44 PM    Creatinine 0.80 12/11/2017 01:44 PM    BUN/Creatinine ratio 24 (H) 12/11/2017 01:44 PM    GFR est AA 96 12/11/2017 01:44 PM    GFR est non-AA 83 12/11/2017 01:44 PM    Calcium 9.1 12/11/2017 01:44 PM    Bilirubin, total <0.2 12/11/2017 01:44 PM    AST (SGOT) 13 12/11/2017 01:44 PM    Alk. phosphatase 76 12/11/2017 01:44 PM    Protein, total 6.8 12/11/2017 01:44 PM    Albumin 4.3 12/11/2017 01:44 PM    A-G Ratio 1.7 12/11/2017 01:44 PM    ALT (SGPT) 15 12/11/2017 01:44 PM       IMAGING:  MRI Results (most recent):  No results found for this or any previous visit. Assessment:     Encounter Diagnoses     ICD-10-CM ICD-9-CM   1.  Migraine without aura and without status migrainosus, not intractable G43.009 346.10   2. Depression, unspecified depression type F32.9 311   3. Fibromyalgia M79.7 67.1      56year old AAF with a h/o depression, fibromyalgia here for f/u of chronic migraines since age 17, L hemispheric refractory to multiple preventative medications. She has failed TCA therapy in the past due to side effects. TPM and VPA for migraine ppx appeared to be effective initially, however she experienced increasing frequency of migraines after aminta a viral illness in [place]. HAs were exacerbated by daily use of abortive therapy for several weeks likely causing some degree of rebound headache as well. Migraine severity/frequency much improved with addition of Cymbalta. No reported side effects with current preventatives. Will continue titration of Cymbalta to assist with headaches as well as fibromyalgia. Headache education  Discussed treatment options, both abortive and preventive medications. Instructed patient about medications and potential side effects. Discussed medication overuse headache and to limit use of analgesics to less than 2 doses per week. Plan:   CMP  Cont. TPM 100mg BID and VPA 500mg BID for HA ppx. Refills provided today. Increase Cymbalta to 40mg/d to assist with headaches and fibromyalgia  Imitrex 50mg PRN for abortive migraine tx, limit use to no more than twice weekly. May use tylenol sparingly for less severe headaches. Follow-up Disposition:  Return in about 3 months (around 11/7/2018).       Signed:  Mckenzie Chang DO  8/7/2018

## 2018-08-07 NOTE — PATIENT INSTRUCTIONS

## 2018-08-07 NOTE — MR AVS SNAPSHOT
[place] [address] 
[phone] Patient: Liz Barros MRN: [id number] :1960 Visit Information Date & Time Provider Department Dept. Phone Encounter #  
 2018  9:40 AM Tutu Montana DO [facility] at [address] [id number] Follow-up Instructions Return in about 3 months (around 2018). Your Appointments 2018  9:40 AM  
Follow Up with Tutu Montana DO [facility] at [address] [facility]) Appt Note: 3 month f/u headache  
 [address] [facility] [address] [facility] [address]  
[phone]  
  
   
 [address] [place] [address]  [id number]  
  
    
 10/9/2018 10:45 AM  
[place] [address] [facility] ([facility]) Appt Note: 3 month follow up [place] [id number]  
[facility] [address] Upcoming Health Maintenance Date Due Influenza Age 5 to Adult 2018 PAP AKA CERVICAL CYTOLOGY 2019 BREAST CANCER SCRN MAMMOGRAM 2020 COLONOSCOPY 3/22/2026 DTaP/Tdap/Td series (2 - Td) 2026 Allergies as of 2018  Review Complete On: 2018 By: Tutu Montana DO Severity Noted Reaction Type Reactions Ambien [Zolpidem]  2016    Unknown (comments) Causes Migraine Cephalexin  2016    Rash Morphine  2015    Rash Motrin [Ibuprofen]  2015    Swelling Nortriptyline  2016    Unknown (comments) Bleeding from mouth Pepcid [Famotidine]  2017    Other (comments) Migraines. Protonix [Pantoprazole]  2016    Rash Vicodin [Hydrocodone-acetaminophen]  2016    Unknown (comments) Causes Migraines Vitamins For Infusion  09/09/2016    Unknown (comments) Pt stated all vitamins cause her lightheadness and sick Current Immunizations  Reviewed on 3/13/2018 Name Date Influenza High Dose Vaccine PF 8/15/2016 Influenza Vaccine 9/1/2015 Influenza Vaccine (Quad) PF 8/1/2017 Pneumococcal Vaccine (Unspecified Type) 9/1/2015 Tdap 12/13/2016 Zoster Vaccine, Live 6/13/2017 Not reviewed this visit You Were Diagnosed With   
  
 Codes Comments Migraine without aura and without status migrainosus, not intractable    -  Primary ICD-10-CM: G43.009 ICD-9-CM: 346.10 Vitals BP Pulse Resp Weight(growth percentile) SpO2 BMI  
 118/74 75 18 179 lb (81.2 kg) 98% 28.04 kg/m2 OB Status Smoking Status Hysterectomy Never Smoker BMI and BSA Data Body Mass Index Body Surface Area 28.04 kg/m 2 1.96 m 2 Preferred Pharmacy Pharmacy Name Phone [facility] [phone] Your Updated Medication List  
  
   
This list is accurate as of 8/7/18  9:38 AM.  Always use your most recent med list.  
  
  
  
  
 * albuterol 2.5 mg /3 mL (0.083 %) nebulizer solution Commonly known as:  PROVENTIL VENTOLIN  
by Nebulization route once. * albuterol 90 mcg/actuation inhaler Commonly known as:  PROVENTIL HFA, VENTOLIN HFA, PROAIR HFA Take 2 Puffs by inhalation every four (4) hours as needed for Wheezing. * albuterol 2.5 mg /3 mL (0.083 %) nebulizer solution Commonly known as:  PROVENTIL VENTOLIN  
3 mL by Nebulization route every four (4) hours as needed for Wheezing. aspirin 81 mg chewable tablet Take 81 mg by mouth daily. aspirin-acetaminophen-caffeine 250-250-65 mg per tablet Commonly known as:  EXCEDRIN ES Take 1 Tab by mouth. Cetirizine 10 mg Cap Take  by mouth. diclofenac EC 75 mg EC tablet Commonly known as:  VOLTAREN  
  
 divalproex  mg tablet Commonly known as:  DEPAKOTE Take 1 Tab by mouth two (2) times a day. DULoxetine 20 mg capsule Commonly known as:  CYMBALTA Take 2 Caps by mouth daily. fluticasone 50 mcg/actuation nasal spray Commonly known as:  FLONASE  
instill 2 sprays into each nostril once daily  
  
 fluticasone-salmeterol 250-50 mcg/dose diskus inhaler Commonly known as:  ADVAIR Take 1 Puff by inhalation every twelve (12) hours. gabapentin 400 mg capsule Commonly known as:  NEURONTIN  
take 1 capsule by mouth three times a day  
  
 hydrocortisone 2.5 % rectal cream  
Commonly known as:  ANUSOL-HC INSERT INTO RECTUM four times a day  
  
 montelukast 10 mg tablet Commonly known as:  SINGULAIR  
take 1 tablet by mouth once daily NAMENDA XR 28 mg capsule Generic drug:  memantine ER Take  by mouth daily. raNITIdine 150 mg tablet Commonly known as:  ZANTAC  
take 1 tablet by mouth twice a day  
  
 simvastatin 20 mg tablet Commonly known as:  ZOCOR Take 1 Tab by mouth nightly. SUMAtriptan 50 mg tablet Commonly known as:  IMITREX Take 1 Tab by mouth once as needed for Migraine for up to 1 dose. topiramate 100 mg tablet Commonly known as:  TOPAMAX Take 1 Tab by mouth two (2) times a day. TYLENOL 325 mg tablet Generic drug:  acetaminophen Take  by mouth every four (4) hours as needed for Pain. * Notice: This list has 3 medication(s) that are the same as other medications prescribed for you. Read the directions carefully, and ask your doctor or other care provider to review them with you. Prescriptions Sent to Pharmacy Refills  
 topiramate (TOPAMAX) 100 mg tablet 2 Sig: Take 1 Tab by mouth two (2) times a day. Class: Normal  
 Pharmacy: [address] Ph #: [phone]  Route: Oral  
 divalproex DR (DEPAKOTE) 500 mg tablet 2  
 Sig: Take 1 Tab by mouth two (2) times a day. Class: Normal  
 Pharmacy: [address] Ph #: [phone] Route: Oral  
 DULoxetine (CYMBALTA) 20 mg capsule 2 Sig: Take 2 Caps by mouth daily. Class: Normal  
 Pharmacy: [address] Ph #: [phone] Route: Oral  
 SUMAtriptan (IMITREX) 50 mg tablet 2 Sig: Take 1 Tab by mouth once as needed for Migraine for up to 1 dose. Class: Normal  
 Pharmacy: [address] Ph #: [phone] Route: Oral  
  
We Performed the Following METABOLIC PANEL, COMPREHENSIVE [53289 CPT(R)] Follow-up Instructions Return in about 3 months (around 11/7/2018). Patient Instructions A Healthy Lifestyle: Care Instructions Your Care Instructions A healthy lifestyle can help you feel good, stay at a healthy weight, and have plenty of energy for both work and play. A healthy lifestyle is something you can share with your whole family. A healthy lifestyle also can lower your risk for serious health problems, such as high blood pressure, heart disease, and diabetes. You can follow a few steps listed below to improve your health and the health of your family. Follow-up care is a key part of your treatment and safety. Be sure to make and go to all appointments, and call your doctor if you are having problems. It's also a good idea to know your test results and keep a list of the medicines you take. How can you care for yourself at home? · Do not eat too much sugar, fat, or fast foods. You can still have dessert and treats now and then. The goal is moderation. · Start small to improve your eating habits. Pay attention to portion sizes, drink less juice and soda pop, and eat more fruits and vegetables. ¨ Eat a healthy amount of food.  A 3-ounce serving of meat, for example, is about the size of a deck of cards. Fill the rest of your plate with vegetables and whole grains. ¨ Limit the amount of soda and sports drinks you have every day. Drink more water when you are thirsty. ¨ Eat at least 5 servings of fruits and vegetables every day. It may seem like a lot, but it is not hard to reach this goal. A serving or helping is 1 piece of fruit, 1 cup of vegetables, or 2 cups of leafy, raw vegetables. Have an apple or some carrot sticks as an afternoon snack instead of a candy bar. Try to have fruits and/or vegetables at every meal. 
· Make exercise part of your daily routine. You may want to start with simple activities, such as walking, bicycling, or slow swimming. Try to be active 30 to 60 minutes every day. You do not need to do all 30 to 60 minutes all at once. For example, you can exercise 3 times a day for 10 or 20 minutes. Moderate exercise is safe for most people, but it is always a good idea to talk to your doctor before starting an exercise program. 
· Keep moving. [place] the lawn, work in the garden, or [employer]. Take the stairs instead of the elevator at work. · If you smoke, quit. People who smoke have an increased risk for heart attack, stroke, cancer, and other lung illnesses. Quitting is hard, but there are ways to boost your chance of quitting tobacco for good. ¨ Use nicotine gum, patches, or lozenges. ¨ Ask your doctor about stop-smoking programs and medicines. ¨ Keep trying. In addition to reducing your risk of diseases in the future, you will notice some benefits soon after you stop using tobacco. If you have shortness of breath or asthma symptoms, they will likely get better within a few weeks after you quit. · Limit how much alcohol you drink. Moderate amounts of alcohol (up to 2 drinks a day for men, 1 drink a day for women) are okay. But drinking too much can lead to liver problems, high blood pressure, and other health problems. Family health If you have a family, there are many things you can do together to improve your health. · Eat meals together as a family as often as possible. · Eat healthy foods. This includes fruits, vegetables, lean meats and dairy, and whole grains. · Include your family in your fitness plan. Most people think of activities such as jogging or tennis as the way to fitness, but there are many ways you and your family can be more active. Anything that makes you breathe hard and gets your heart pumping is exercise. Here are some tips: 
¨ Walk to do errands or to take your child to school or the bus. ¨ Go for a family bike ride after dinner instead of watching TV. Where can you learn more? Go to http://shell-td.info/. Enter C146 in the search box to learn more about \"A Healthy Lifestyle: Care Instructions. \" Current as of: December 7, 2017 Content Version: 11.7 © 0968-2009 [employer]. Care instructions adapted under license by [employer] (which disclaims liability or warranty for this information). If you have questions about a medical condition or this instruction, always ask your healthcare professional. [place] [address] any warranty or liability for your use of this information. Introducing [facility]! [employer] introduces [facility] patient portal. Now you can access parts of your medical record, email your doctor's office, and request medication refills online. 1. In your internet browser, go to [email or web address] 2. Click on the First Time User? Click Here link in the Sign In box. You will see the New Member Sign Up page. 3. Enter your [facility] Access Code exactly as it appears below. You will not need to use this code after youve completed the sign-up process. If you do not sign up before the expiration date, you must request a new code. · [facility] Access Code: [email or web address] Expires: 9/4/2018  4:49 PM 
 
 4. Enter the last four digits of your Social Security Number (xxxx) and Date of Birth (mm/dd/yyyy) as indicated and click Submit. You will be taken to the next sign-up page. 5. Create a [employer] ID. This will be your [employer] login ID and cannot be changed, so think of one that is secure and easy to remember. 6. Create a [employer] password. You can change your password at any time. 7. Enter your Password Reset Question and Answer. This can be used at a later time if you forget your password. 8. Enter your e-mail address. You will receive e-mail notification when new information is available in [address] 9. Click Sign Up. You can now view and download portions of your medical record. 10. Click the Download Summary menu link to download a portable copy of your medical information. If you have questions, please visit the Frequently Asked Questions section of the [employer] website. Remember, [employer] is NOT to be used for urgent needs. For medical emergencies, dial 911. Now available from your iPhone and Android! Please provide this summary of care documentation to your next provider. Your primary care clinician is listed as Volodymyr Sanders. If you have any questions after today's visit, please call [phone].

## 2018-08-08 ENCOUNTER — TELEPHONE (OUTPATIENT)
Dept: NEUROLOGY | Age: 58
End: 2018-08-08

## 2018-08-08 LAB
ALBUMIN SERPL-MCNC: 4.3 G/DL (ref 3.5–5.5)
ALBUMIN/GLOB SERPL: 1.6 {RATIO} (ref 1.2–2.2)
ALP SERPL-CCNC: 100 IU/L (ref 39–117)
ALT SERPL-CCNC: 13 IU/L (ref 0–32)
AST SERPL-CCNC: 22 IU/L (ref 0–40)
BILIRUB SERPL-MCNC: 0.3 MG/DL (ref 0–1.2)
BUN SERPL-MCNC: 14 MG/DL (ref 6–24)
BUN/CREAT SERPL: 15 (ref 9–23)
CALCIUM SERPL-MCNC: 9.3 MG/DL (ref 8.7–10.2)
CHLORIDE SERPL-SCNC: 110 MMOL/L (ref 96–106)
CO2 SERPL-SCNC: 22 MMOL/L (ref 20–29)
CREAT SERPL-MCNC: 0.91 MG/DL (ref 0.57–1)
GLOBULIN SER CALC-MCNC: 2.7 G/DL (ref 1.5–4.5)
GLUCOSE SERPL-MCNC: 89 MG/DL (ref 65–99)
POTASSIUM SERPL-SCNC: 4.4 MMOL/L (ref 3.5–5.2)
PROT SERPL-MCNC: 7 G/DL (ref 6–8.5)
SODIUM SERPL-SCNC: 146 MMOL/L (ref 134–144)

## 2018-08-08 NOTE — TELEPHONE ENCOUNTER
Spoke with patient, informed her of labs per .  Will fax a copy to PCP and mail a copy to patient per her request.

## 2018-08-31 ENCOUNTER — OFFICE VISIT (OUTPATIENT)
Dept: FAMILY MEDICINE CLINIC | Age: 58
End: 2018-08-31

## 2018-08-31 VITALS
HEIGHT: 67 IN | TEMPERATURE: 98.3 F | SYSTOLIC BLOOD PRESSURE: 131 MMHG | RESPIRATION RATE: 19 BRPM | OXYGEN SATURATION: 98 % | HEART RATE: 62 BPM | DIASTOLIC BLOOD PRESSURE: 71 MMHG | BODY MASS INDEX: 27.67 KG/M2 | WEIGHT: 176.3 LBS

## 2018-08-31 DIAGNOSIS — R20.2 PARESTHESIA: ICD-10-CM

## 2018-08-31 DIAGNOSIS — R51.9 NONINTRACTABLE HEADACHE, UNSPECIFIED CHRONICITY PATTERN, UNSPECIFIED HEADACHE TYPE: ICD-10-CM

## 2018-08-31 DIAGNOSIS — M79.7 FIBROMYALGIA: ICD-10-CM

## 2018-08-31 DIAGNOSIS — M54.2 POSTERIOR NECK PAIN: Primary | ICD-10-CM

## 2018-08-31 RX ORDER — METHOCARBAMOL 500 MG/1
500 TABLET, FILM COATED ORAL
Qty: 30 TAB | Refills: 1 | Status: SHIPPED | OUTPATIENT
Start: 2018-08-31 | End: 2018-10-09 | Stop reason: SDUPTHER

## 2018-08-31 NOTE — PATIENT INSTRUCTIONS
Fibromyalgia: Care Instructions  Your Care Instructions    Fibromyalgia is a painful condition that is not completely understood by medical experts. The cause of fibromyalgia is not known. It can make you feel tired and ache all over. It causes tender spots at specific points of the body that hurt only when you press on them. You may have trouble sleeping, as well as other symptoms. These problems can upset your work and home life. Symptoms tend to come and go, although they may never go away completely. Fibromyalgia does not harm your muscles, joints, or organs. Follow-up care is a key part of your treatment and safety. Be sure to make and go to all appointments, and call your doctor if you are having problems. It's also a good idea to know your test results and keep a list of the medicines you take. How can you care for yourself at home? · Exercise often. Walk, swim, or bike to help with pain and sleep problems and to make you feel better. · Try to get a good night's sleep. Go to bed and get up at the same time each day, whether you feel rested or not. Make sure you have a good mattress and pillow. · Reduce stress. Avoid things that cause you stress, if you can. If not, work at making them less stressful. Learn to use biofeedback, guided imagery, meditation, or other methods to relax. · Make healthy changes. Eat a balanced diet, quit smoking, and limit alcohol and caffeine. · Use a heating pad set on low or take warm baths or showers for pain. Using cold packs for up to 20 minutes at a time can also relieve pain. Put a thin cloth between the cold pack and your skin. A gentle massage might help too. · Be safe with medicines. Take your medicines exactly as prescribed. Call your doctor if you think you are having a problem with your medicine. Your doctor may talk to you about taking antidepressant medicines. These medicines may improve sleep, relieve pain, and in some cases treat depression.   · Learn about fibromyalgia. This makes coping easier. Then, take an active role in your treatment. · Think about joining a support group with others who have fibromyalgia to learn more and get support. When should you call for help? Watch closely for changes in your health, and be sure to contact your doctor if:    · You feel sad, helpless, or hopeless; lose interest in things you used to enjoy; or have other symptoms of depression.     · Your fibromyalgia symptoms get worse. Where can you learn more? Go to http://shell-td.info/. Enter V003 in the search box to learn more about \"Fibromyalgia: Care Instructions. \"  Current as of: October 9, 2017  Content Version: 11.7  © 9686-0447 Sherpaa. Care instructions adapted under license by [employer]UB (which disclaims liability or warranty for this information). If you have questions about a medical condition or this instruction, always ask your healthcare professional. [place] [address] any warranty or liability for your use of this information. Neck Pain: Care Instructions  Your Care Instructions    You can have neck pain anywhere from the bottom of your head to the top of your shoulders. It can spread to the upper back or arms. Injuries, painting a ceiling, sleeping with your neck twisted, staying in one position for too long, and many other activities can cause neck pain. Most neck pain gets better with home care. Your doctor may recommend medicine to relieve pain or relax your muscles. He or she may suggest exercise and physical therapy to increase flexibility and relieve stress. You may need to wear a special (cervical) collar to support your neck for a day or two. Follow-up care is a key part of your treatment and safety. Be sure to make and go to all appointments, and call your doctor if you are having problems.  It's also a good idea to know your test results and keep a list of the medicines you take. How can you care for yourself at home? · Try using a heating pad on a low or medium setting for 15 to 20 minutes every 2 or 3 hours. Try a warm shower in place of one session with the heating pad. · You can also try an ice pack for 10 to 15 minutes every 2 to 3 hours. Put a thin cloth between the ice and your skin. · Take pain medicines exactly as directed. ¨ If the doctor gave you a prescription medicine for pain, take it as prescribed. ¨ If you are not taking a prescription pain medicine, ask your doctor if you can take an over-the-counter medicine. · If your doctor recommends a cervical collar, wear it exactly as directed. When should you call for help? Call your doctor now or seek immediate medical care if:    · You have new or worsening numbness in your arms, buttocks or legs.     · You have new or worsening weakness in your arms or legs. (This could make it hard to stand up.)     · You lose control of your bladder or bowels.    Watch closely for changes in your health, and be sure to contact your doctor if:    · Your neck pain is getting worse.     · You are not getting better after 1 week.     · You do not get better as expected. Where can you learn more? Go to http://shell-td.info/. Enter [id number] in the search box to learn more about \"Neck Pain: Care Instructions. \"  Current as of: November 29, 2017  Content Version: 11.7  © 0247-3270 KBLE. Care instructions adapted under license by motify (which disclaims liability or warranty for this information). If you have questions about a medical condition or this instruction, always ask your healthcare professional. [place] [address] any warranty or liability for your use of this information.

## 2018-08-31 NOTE — MR AVS SNAPSHOT
[address] [place] [id number] 
[phone] Patient: Betsy Gama MRN: [id number] :1960 Visit Information Date & Time Provider Department Dept. Phone Encounter #  
 2018 11:15 AM Mohan Srivastava NP [address] [facility] [id number] Follow-up Instructions Return if symptoms worsen or fail to improve, for make f/u appt with neurologist as soon as possible. Your Appointments 10/9/2018 10:45 AM  
ROUTINE CARE with Mohan Srivastava NP [address] [facility] [facility]) Appt Note: 3 month follow up Henrik Bennett [address]  
Rajeshybsingh [id number]  
  
    
 2018 11:40 AM  
Follow Up with Hector Castaneda DO [facility]) Appt Note: 3 month f/u headache  
 [address] [facility] [address]  
[phone]  
  
   
 [address] [place] [address] Upcoming Health Maintenance Date Due  
 MEDICARE YEARLY EXAM 2018 PAP AKA CERVICAL CYTOLOGY 2019 BREAST CANCER SCRN MAMMOGRAM 2020 COLONOSCOPY 3/22/2026 DTaP/Tdap/Td series (2 - Td) 2026 Allergies as of 2018  Review Complete On: 2018 By: En Acevedo LPN Severity Noted Reaction Type Reactions Ambien [Zolpidem]  2016    Unknown (comments) Causes Migraine Cephalexin  2016    Rash Morphine  2015    Rash Motrin [Ibuprofen]  2015    Swelling Nortriptyline  2016    Unknown (comments) Bleeding from mouth Pepcid [Famotidine]  2017    Other (comments) Migraines. Protonix [Pantoprazole]  2016    Rash Vicodin [Hydrocodone-acetaminophen]  2016    Unknown (comments) Causes Migraines Vitamins For Infusion  09/09/2016    Unknown (comments) Pt stated all vitamins cause her lightheadness and sick Current Immunizations  Reviewed on 3/13/2018 Name Date Influenza High Dose Vaccine PF 8/15/2016 Influenza Vaccine 8/19/2018, 9/1/2015 Influenza Vaccine (Quad) PF 8/1/2017 Pneumococcal Vaccine (Unspecified Type) 9/1/2015 Tdap 12/13/2016 Zoster Vaccine, Live 6/13/2017 Not reviewed this visit You Were Diagnosed With   
  
 Codes Comments Posterior neck pain    -  Primary ICD-10-CM: M54.2 ICD-9-CM: 723.1 Paresthesia     ICD-10-CM: R20.2 ICD-9-CM: 782.0 Fibromyalgia     ICD-10-CM: M79.7 ICD-9-CM: 729.1 Vitals BP Pulse Temp Resp Height(growth percentile) Weight(growth percentile) 131/71 62 98.3 °F (36.8 °C) (Oral) 19 5' 7\" (1.702 m) 176 lb 4.8 oz (80 kg) SpO2 BMI OB Status Smoking Status 98% 27.61 kg/m2 Hysterectomy Never Smoker Vitals History BMI and BSA Data Body Mass Index Body Surface Area  
 27.61 kg/m 2 1.94 m 2 [facility] [place] Adithya Magdaleno [phone] Your Updated Medication List  
  
   
This list is accurate as of 8/31/18 11:58 AM.  Always use your most recent med list.  
  
  
  
  
 * albuterol 2.5 mg /3 mL (0.083 %) nebulizer solution Commonly known as:  PROVENTIL VENTOLIN  
by Nebulization route once. * albuterol 90 mcg/actuation inhaler Commonly known as:  PROVENTIL HFA, VENTOLIN HFA, PROAIR HFA Take 2 Puffs by inhalation every four (4) hours as needed for Wheezing. * albuterol 2.5 mg /3 mL (0.083 %) nebulizer solution Commonly known as:  PROVENTIL VENTOLIN  
3 mL by Nebulization route every four (4) hours as needed for Wheezing. aspirin 81 mg chewable tablet Take 81 mg by mouth daily. aspirin-acetaminophen-caffeine 250-250-65 mg per tablet Commonly known as:  EXCEDRIN ES  
 Take 1 Tab by mouth. Cetirizine 10 mg Cap Take  by mouth. diclofenac EC 75 mg EC tablet Commonly known as:  VOLTAREN  
  
 divalproex  mg tablet Commonly known as:  DEPAKOTE Take 1 Tab by mouth two (2) times a day. DULoxetine 20 mg capsule Commonly known as:  CYMBALTA Take 2 Caps by mouth daily. fluticasone 50 mcg/actuation nasal spray Commonly known as:  FLONASE  
instill 2 sprays into each nostril once daily  
  
 fluticasone-salmeterol 250-50 mcg/dose diskus inhaler Commonly known as:  ADVAIR Take 1 Puff by inhalation every twelve (12) hours. gabapentin 400 mg capsule Commonly known as:  NEURONTIN  
take 1 capsule by mouth three times a day  
  
 hydrocortisone 2.5 % rectal cream  
Commonly known as:  ANUSOL-HC INSERT INTO RECTUM four times a day  
  
 methocarbamol 500 mg tablet Commonly known as:  ROBAXIN Take 1 Tab by mouth nightly as needed. montelukast 10 mg tablet Commonly known as:  SINGULAIR  
take 1 tablet by mouth once daily NAMENDA XR 28 mg capsule Generic drug:  memantine ER Take  by mouth daily. raNITIdine 150 mg tablet Commonly known as:  ZANTAC  
take 1 tablet by mouth twice a day  
  
 simvastatin 20 mg tablet Commonly known as:  ZOCOR Take 1 Tab by mouth nightly. topiramate 100 mg tablet Commonly known as:  TOPAMAX Take 1 Tab by mouth two (2) times a day. TYLENOL 325 mg tablet Generic drug:  acetaminophen Take  by mouth every four (4) hours as needed for Pain. * Notice: This list has 3 medication(s) that are the same as other medications prescribed for you. Read the directions carefully, and ask your doctor or other care provider to review them with you. Prescriptions Sent to Pharmacy Refills  
 methocarbamol (ROBAXIN) 500 mg tablet 1 Sig: Take 1 Tab by mouth nightly as needed.   
 Class: Normal  
 Pharmacy: [id number] Ph #: [phone] Route: Oral  
  
Follow-up Instructions Return if symptoms worsen or fail to improve, for make f/u appt with neurologist as soon as possible. Patient Instructions Fibromyalgia: Care Instructions Your Care Instructions Fibromyalgia is a painful condition that is not completely understood by medical experts. The cause of fibromyalgia is not known. It can make you feel tired and ache all over. It causes tender spots at specific points of the body that hurt only when you press on them. You may have trouble sleeping, as well as other symptoms. These problems can upset your work and home life. Symptoms tend to come and go, although they may never go away completely. Fibromyalgia does not harm your muscles, joints, or organs. Follow-up care is a key part of your treatment and safety. Be sure to make and go to all appointments, and call your doctor if you are having problems. It's also a good idea to know your test results and keep a list of the medicines you take. How can you care for yourself at home? · Exercise often. Walk, swim, or bike to help with pain and sleep problems and to make you feel better. · Try to get a good night's sleep. Go to bed and get up at the same time each day, whether you feel rested or not. Make sure you have a good mattress and pillow. · Reduce stress. Avoid things that cause you stress, if you can. If not, work at making them less stressful. Learn to use biofeedback, guided imagery, meditation, or other methods to relax. · Make healthy changes. Eat a balanced diet, quit smoking, and limit alcohol and caffeine. · Use a heating pad set on low or take warm baths or showers for pain. Using cold packs for up to 20 minutes at a time can also relieve pain. Put a thin cloth between the cold pack and your skin. A gentle massage might help too. · Be safe with medicines. Take your medicines exactly as prescribed. Call your doctor if you think you are having a problem with your medicine. Your doctor may talk to you about taking antidepressant medicines. These medicines may improve sleep, relieve pain, and in some cases treat depression. · Learn about fibromyalgia. This makes coping easier. Then, take an active role in your treatment. · Think about joining a support group with others who have fibromyalgia to learn more and get support. When should you call for help? Watch closely for changes in your health, and be sure to contact your doctor if: 
  · You feel sad, helpless, or hopeless; lose interest in things you used to enjoy; or have other symptoms of depression.  
  · Your fibromyalgia symptoms get worse. Where can you learn more? Go to http://shell-td.info/. Enter V003 in the search box to learn more about \"Fibromyalgia: Care Instructions. \" Current as of: October 9, 2017 Content Version: 11.7 © 2532-4114 ReadyCart. Care instructions adapted under license by CNS Response (which disclaims liability or warranty for this information). If you have questions about a medical condition or this instruction, always ask your healthcare professional. [address] any warranty or liability for your use of this information. Neck Pain: Care Instructions Your Care Instructions You can have neck pain anywhere from the bottom of your head to the top of your shoulders. It can spread to the upper back or arms. Injuries, painting a ceiling, sleeping with your neck twisted, staying in one position for too long, and many other activities can cause neck pain. Most neck pain gets better with home care. Your doctor may recommend medicine to relieve pain or relax your muscles. He or she may suggest exercise and physical therapy to increase flexibility and relieve stress. You may need to wear a special (cervical) collar to support your neck for a day or two. Follow-up care is a key part of your treatment and safety. Be sure to make and go to all appointments, and call your doctor if you are having problems. It's also a good idea to know your test results and keep a list of the medicines you take. How can you care for yourself at home? · Try using a heating pad on a low or medium setting for 15 to 20 minutes every 2 or 3 hours. Try a warm shower in place of one session with the heating pad. · You can also try an ice pack for 10 to 15 minutes every 2 to 3 hours. Put a thin cloth between the ice and your skin. · Take pain medicines exactly as directed. ¨ If the doctor gave you a prescription medicine for pain, take it as prescribed. ¨ If you are not taking a prescription pain medicine, ask your doctor if you can take an over-the-counter medicine. · If your doctor recommends a cervical collar, wear it exactly as directed. When should you call for help? Call your doctor now or seek immediate medical care if: 
  · You have new or worsening numbness in your arms, buttocks or legs.  
  · You have new or worsening weakness in your arms or legs. (This could make it hard to stand up.)  
  · You lose control of your bladder or bowels.  
 Watch closely for changes in your health, and be sure to contact your doctor if: 
  · Your neck pain is getting worse.  
  · You are not getting better after 1 week.  
  · You do not get better as expected. Where can you learn more? Go to http://shell-td.info/. Enter [id number] in the search box to learn more about \"Neck Pain: Care Instructions. \" Current as of: November 29, 2017 Content Version: 11.7 © 5201-6957 Nutrino. Care instructions adapted under license by Mostro (which disclaims liability or warranty for this information).  If you have questions about a medical condition or this instruction, always ask your healthcare professional. [address] any warranty or liability for your use of this information. Introducing [facility]! [facility] introduces BiBCOM patient portal. Now you can access parts of your medical record, email your doctor's office, and request medication refills online. 1. In your internet browser, go to [email or web address] 2. Click on the First Time User? Click Here link in the Sign In box. You will see the New Member Sign Up page. 3. Enter your BiBCOM Access Code exactly as it appears below. You will not need to use this code after youve completed the sign-up process. If you do not sign up before the expiration date, you must request a new code. · BiBCOM Access Code: [email or web address] Expires: 9/4/2018  4:49 PM 
 
4. Enter the last four digits of your Social Security Number (xxxx) and Date of Birth (mm/dd/yyyy) as indicated and click Submit. You will be taken to the next sign-up page. 5. Create a BiBCOM ID. This will be your BiBCOM login ID and cannot be changed, so think of one that is secure and easy to remember. 6. Create a BiBCOM password. You can change your password at any time. 7. Enter your Password Reset Question and Answer. This can be used at a later time if you forget your password. 8. Enter your e-mail address. You will receive e-mail notification when new information is available in [address]. 9. Click Sign Up. You can now view and download portions of your medical record. 10. Click the Download Summary menu link to download a portable copy of your medical information. If you have questions, please visit the Frequently Asked Questions section of the BiBCOM website. Remember, BiBCOM is NOT to be used for urgent needs. For medical emergencies, dial 911. Now available from your iPhone and Android! Please provide this summary of care documentation to your next provider. Your primary care clinician is listed as Dona Lam. If you have any questions after today's visit, please call [phone].

## 2018-08-31 NOTE — PROGRESS NOTES
Chief Complaint   Patient presents with    Headache    Neck Pain     Pt here for C/O increased headaches (not migraine per pt), and neck pain X 2 weeks. Pt reports pain at worse is worse that 10.

## 2018-09-12 NOTE — PROGRESS NOTES
Jostin Madden is a 56 y.o. female who presents with the following complaints:  Chief Complaint   Patient presents with    Headache    Neck Pain       Subjective:    HPI:   C/o headache and neck pain, describes as posterior, moderate to severe at times. Associated with burning and paresthesia of the posterior neck and scalp. Symptoms present intermittently for the past 2 weeks, increasing in frequency and duration. She has not been taking gabapentin as prescribed, taking 1 pill once or twice a day, she felt that it may be the cause of her symptoms. Since cutting back on the medication she has noted increased fatigue and soreness from her fibromyalgia.      Pertinent PMH/FH/SH:  Past Medical History:   Diagnosis Date    Anal fistula     Asthma     Back ache     CAD (coronary artery disease)     leaky valves    Cholesterol blood decreased     Dementia     Depression     Deviated septum     Diabetes (HCC)     pre diabetese    Fibromyalgia     Frequent headaches     Heart valve disease     Herniated nucleus pulposus, L5-S1     Migraine     Protein S deficiency (HCC)     Pulmonary emboli (HCC)     Pulmonary hypertension (HCC)     Restless leg syndrome     RLS (restless legs syndrome)     Sickle cell trait (HCC)     Sleep apnea     Stroke ([address]     Upper airway resistance syndrome     Vaginal fistula      Past Surgical History:   Procedure Laterality Date    CARDIAC SURG PROCEDURE UNLIST  9/23/99    closure of unroofed coronary sinus    HX ROTATOR CUFF REPAIR      3/2017     Family History   Problem Relation Age of Onset    Diabetes Mother     Heart Disease Mother     Hypertension Mother     Sickle Cell Anemia Sister     Hypertension Brother     Stroke Maternal Aunt      Social History     Social History    Marital status: SINGLE     Spouse name: N/A    Number of children: N/A    Years of education: N/A     Social History Main Topics    Smoking status: Never Smoker    Smokeless tobacco: Never Used    Alcohol use No    Drug use: No    Sexual activity: Not Currently     Other Topics Concern    None     Social History Narrative     Advanced Directives: N      Patient Active Problem List    Diagnosis    Fibromyalgia    Bilateral carpal tunnel syndrome    Rectal vaginal fistula    Valvular disease    Depression     She sees a psychiatrist she says      Prediabetes    Seasonal allergic rhinitis    Mild intermittent asthma without complication    Unroofed coronary sinus    Pulmonary embolism ([facility] [address]       Nurse notes were reviewed and are correct  Review of Systems - negative except as listed above in the HPI    Objective:     Vitals:    08/31/18 1131   BP: 131/71   Pulse: 62   Resp: 19   Temp: 98.3 °F (36.8 °C)   TempSrc: Oral   SpO2: 98%   Weight: 176 lb 4.8 oz (80 kg)   Height: 5' 7\" (1.702 m)     Physical Examination: General appearance - alert, well appearing, and in no distress, oriented to person, place, and time and well hydrated  Mental status - anxious, agitated, tearful  Eyes - pupils equal and reactive, extraocular eye movements intact  Neck - supple, no significant adenopathy  Chest - clear to auscultation, no wheezes, rales or rhonchi, symmetric air entry  Heart - normal rate, regular rhythm, normal S1, S2, no murmurs, rubs, clicks or gallops, normal bilateral carotid upstroke without bruits, no JVD  Abdomen - soft, nontender, nondistended, no masses or organomegaly  Neurological - alert, oriented, normal speech, no focal findings or movement disorder noted  Musculoskeletal - diffuse tenderness neck, back, upper arms, thighes. Extremities - no pedal edema noted, intact peripheral pulses  Skin - normal coloration and turgor, no rashes, no suspicious skin lesions noted    Assessment/ Plan:   Diagnoses and all orders for this visit:    1. Posterior neck pain  2. Headache  3.  Paresthesia  Tension headaches vs occipital neuralgia  Add muscle relaxant  Heat or ice TID  F/u with her neurologist to discuss new symptoms  Increase gabapentin to ordered dose  -     methocarbamol (ROBAXIN) 500 mg tablet; Take 1 Tab by mouth nightly as needed. 4. Fibromyalgia  Increase gabapentin to prescribed dose  Continue cymbalta     Follow-up Disposition:  Return if symptoms worsen or fail to improve, for make f/u appt with neurologist as soon as possible. I have discussed the diagnosis with the patient and the intended plan as seen in the above orders. The patient has received an after-visit summary and questions were answered concerning future plans. The patient verbalizes understanding. Medication Side Effects and Warnings were discussed with patient: yes  Patient Labs were reviewed and or requested: yes  Patient Past Records were reviewed and or requested: yes    Patient Instructions          Fibromyalgia: Care Instructions  Your Care Instructions    Fibromyalgia is a painful condition that is not completely understood by medical experts. The cause of fibromyalgia is not known. It can make you feel tired and ache all over. It causes tender spots at specific points of the body that hurt only when you press on them. You may have trouble sleeping, as well as other symptoms. These problems can upset your work and home life. Symptoms tend to come and go, although they may never go away completely. Fibromyalgia does not harm your muscles, joints, or organs. Follow-up care is a key part of your treatment and safety. Be sure to make and go to all appointments, and call your doctor if you are having problems. It's also a good idea to know your test results and keep a list of the medicines you take. How can you care for yourself at home? · Exercise often. Walk, swim, or bike to help with pain and sleep problems and to make you feel better. · Try to get a good night's sleep. Go to bed and get up at the same time each day, whether you feel rested or not.  Make sure you have a good mattress and pillow. · Reduce stress. Avoid things that cause you stress, if you can. If not, work at making them less stressful. Learn to use biofeedback, guided imagery, meditation, or other methods to relax. · Make healthy changes. Eat a balanced diet, quit smoking, and limit alcohol and caffeine. · Use a heating pad set on low or take warm baths or showers for pain. Using cold packs for up to 20 minutes at a time can also relieve pain. Put a thin cloth between the cold pack and your skin. A gentle massage might help too. · Be safe with medicines. Take your medicines exactly as prescribed. Call your doctor if you think you are having a problem with your medicine. Your doctor may talk to you about taking antidepressant medicines. These medicines may improve sleep, relieve pain, and in some cases treat depression. · Learn about fibromyalgia. This makes coping easier. Then, take an active role in your treatment. · Think about joining a support group with others who have fibromyalgia to learn more and get support. When should you call for help? Watch closely for changes in your health, and be sure to contact your doctor if:    · You feel sad, helpless, or hopeless; lose interest in things you used to enjoy; or have other symptoms of depression.     · Your fibromyalgia symptoms get worse. Where can you learn more? Go to http://shell-td.info/. Enter V003 in the search box to learn more about \"Fibromyalgia: Care Instructions. \"  Current as of: October 9, 2017  Content Version: 11.7  © 6970-1281 [employer]. Care instructions adapted under license by [employer] (which disclaims liability or warranty for this information). If you have questions about a medical condition or this instruction, always ask your healthcare professional. [place] [address] any warranty or liability for your use of this information.        Neck Pain: Care Instructions  Your Care Instructions    You can have neck pain anywhere from the bottom of your head to the top of your shoulders. It can spread to the upper back or arms. Injuries, painting a ceiling, sleeping with your neck twisted, staying in one position for too long, and many other activities can cause neck pain. Most neck pain gets better with home care. Your doctor may recommend medicine to relieve pain or relax your muscles. He or she may suggest exercise and physical therapy to increase flexibility and relieve stress. You may need to wear a special (cervical) collar to support your neck for a day or two. Follow-up care is a key part of your treatment and safety. Be sure to make and go to all appointments, and call your doctor if you are having problems. It's also a good idea to know your test results and keep a list of the medicines you take. How can you care for yourself at home? · Try using a heating pad on a low or medium setting for 15 to 20 minutes every 2 or 3 hours. Try a warm shower in place of one session with the heating pad. · You can also try an ice pack for 10 to 15 minutes every 2 to 3 hours. Put a thin cloth between the ice and your skin. · Take pain medicines exactly as directed. ¨ If the doctor gave you a prescription medicine for pain, take it as prescribed. ¨ If you are not taking a prescription pain medicine, ask your doctor if you can take an over-the-counter medicine. · If your doctor recommends a cervical collar, wear it exactly as directed. When should you call for help? Call your doctor now or seek immediate medical care if:    · You have new or worsening numbness in your arms, buttocks or legs.     · You have new or worsening weakness in your arms or legs.  (This could make it hard to stand up.)     · You lose control of your bladder or bowels.    Watch closely for changes in your health, and be sure to contact your doctor if:    · Your neck pain is getting worse.     · You are not getting better after 1 week.     · You do not get better as expected. Where can you learn more? Go to http://shell-td.info/. Enter [id number] in the search box to learn more about \"Neck Pain: Care Instructions. \"  Current as of: November 29, 2017  Content Version: 11.7  © 1351-9429 [employer]. Care instructions adapted under license by [employer] (which disclaims liability or warranty for this information). If you have questions about a medical condition or this instruction, always ask your healthcare professional. [place] [address] any warranty or liability for your use of this information.           Polo JOSEPH

## 2018-10-05 DIAGNOSIS — M79.7 FIBROMYALGIA: ICD-10-CM

## 2018-10-05 DIAGNOSIS — J45.41 MODERATE PERSISTENT ASTHMA WITH ACUTE EXACERBATION IN ADULT: ICD-10-CM

## 2018-10-05 DIAGNOSIS — M54.50 ACUTE BILATERAL LOW BACK PAIN WITHOUT SCIATICA: ICD-10-CM

## 2018-10-05 RX ORDER — DONEPEZIL HYDROCHLORIDE 10 MG/1
TABLET, FILM COATED ORAL
Refills: 0 | COMMUNITY
Start: 2018-09-14 | End: 2018-10-09 | Stop reason: SDUPTHER

## 2018-10-05 RX ORDER — SUMATRIPTAN 50 MG/1
50 TABLET, FILM COATED ORAL
Status: CANCELLED | OUTPATIENT
Start: 2018-10-05 | End: 2018-10-05

## 2018-10-05 RX ORDER — PAROXETINE HYDROCHLORIDE 20 MG/1
TABLET, FILM COATED ORAL
Qty: 90 TAB | Refills: 3 | Status: CANCELLED | OUTPATIENT
Start: 2018-10-05

## 2018-10-05 RX ORDER — FLUTICASONE PROPIONATE AND SALMETEROL 250; 50 UG/1; UG/1
1 POWDER RESPIRATORY (INHALATION) EVERY 12 HOURS
Qty: 3 INHALER | Refills: 3 | Status: CANCELLED | OUTPATIENT
Start: 2018-10-05

## 2018-10-05 RX ORDER — RANITIDINE 150 MG/1
TABLET, FILM COATED ORAL
Qty: 180 TAB | Refills: 3 | Status: CANCELLED | OUTPATIENT
Start: 2018-10-05

## 2018-10-05 RX ORDER — TOPIRAMATE 100 MG/1
100 TABLET, FILM COATED ORAL 2 TIMES DAILY
Qty: 180 TAB | Refills: 3 | Status: CANCELLED | OUTPATIENT
Start: 2018-10-05

## 2018-10-05 RX ORDER — PAROXETINE HYDROCHLORIDE 20 MG/1
TABLET, FILM COATED ORAL
Refills: 0 | COMMUNITY
Start: 2018-09-14 | End: 2018-10-09 | Stop reason: SDUPTHER

## 2018-10-08 DIAGNOSIS — J30.2 SEASONAL ALLERGIC RHINITIS, UNSPECIFIED TRIGGER: ICD-10-CM

## 2018-10-08 DIAGNOSIS — J45.20 MILD INTERMITTENT ASTHMA WITHOUT COMPLICATION: ICD-10-CM

## 2018-10-08 DIAGNOSIS — E78.00 HYPERCHOLESTEREMIA: ICD-10-CM

## 2018-10-08 RX ORDER — MEMANTINE HYDROCHLORIDE 28 MG/1
28 CAPSULE, EXTENDED RELEASE ORAL DAILY
Qty: 90 CAP | Refills: 3 | Status: CANCELLED | OUTPATIENT
Start: 2018-10-08

## 2018-10-08 RX ORDER — SIMVASTATIN 20 MG/1
20 TABLET, FILM COATED ORAL
Qty: 90 TAB | Refills: 3 | Status: CANCELLED | OUTPATIENT
Start: 2018-10-08

## 2018-10-08 RX ORDER — MONTELUKAST SODIUM 10 MG/1
TABLET ORAL
Qty: 90 TAB | Refills: 3 | Status: CANCELLED | OUTPATIENT
Start: 2018-10-08

## 2018-10-09 ENCOUNTER — OFFICE VISIT (OUTPATIENT)
Dept: FAMILY MEDICINE CLINIC | Age: 58
End: 2018-10-09

## 2018-10-09 VITALS
HEART RATE: 60 BPM | SYSTOLIC BLOOD PRESSURE: 117 MMHG | TEMPERATURE: 98.4 F | DIASTOLIC BLOOD PRESSURE: 71 MMHG | RESPIRATION RATE: 18 BRPM | OXYGEN SATURATION: 98 % | HEIGHT: 67 IN | BODY MASS INDEX: 28.3 KG/M2 | WEIGHT: 180.3 LBS

## 2018-10-09 DIAGNOSIS — E78.00 HYPERCHOLESTEREMIA: ICD-10-CM

## 2018-10-09 DIAGNOSIS — G43.009 MIGRAINE WITHOUT AURA AND WITHOUT STATUS MIGRAINOSUS, NOT INTRACTABLE: ICD-10-CM

## 2018-10-09 DIAGNOSIS — F03.90 DEMENTIA WITHOUT BEHAVIORAL DISTURBANCE, UNSPECIFIED DEMENTIA TYPE: ICD-10-CM

## 2018-10-09 DIAGNOSIS — J45.40 MODERATE PERSISTENT ASTHMA WITHOUT COMPLICATION: ICD-10-CM

## 2018-10-09 DIAGNOSIS — M54.50 ACUTE BILATERAL LOW BACK PAIN WITHOUT SCIATICA: ICD-10-CM

## 2018-10-09 DIAGNOSIS — M54.2 POSTERIOR NECK PAIN: ICD-10-CM

## 2018-10-09 DIAGNOSIS — R09.82 POST-NASAL DRAINAGE: ICD-10-CM

## 2018-10-09 DIAGNOSIS — M79.7 FIBROMYALGIA: Primary | ICD-10-CM

## 2018-10-09 RX ORDER — PAROXETINE HYDROCHLORIDE 20 MG/1
20 TABLET, FILM COATED ORAL DAILY
Qty: 90 TAB | Refills: 1 | Status: SHIPPED | OUTPATIENT
Start: 2018-10-09 | End: 2019-03-11 | Stop reason: SDUPTHER

## 2018-10-09 RX ORDER — DULOXETIN HYDROCHLORIDE 20 MG/1
40 CAPSULE, DELAYED RELEASE ORAL DAILY
Qty: 60 CAP | Refills: 2 | Status: SHIPPED | OUTPATIENT
Start: 2018-10-09 | End: 2018-10-18 | Stop reason: DRUGHIGH

## 2018-10-09 RX ORDER — METHOCARBAMOL 500 MG/1
500 TABLET, FILM COATED ORAL
Qty: 90 TAB | Refills: 1 | Status: SHIPPED | OUTPATIENT
Start: 2018-10-09 | End: 2019-01-14 | Stop reason: SDUPTHER

## 2018-10-09 RX ORDER — FLUTICASONE PROPIONATE AND SALMETEROL 250; 50 UG/1; UG/1
1 POWDER RESPIRATORY (INHALATION) EVERY 12 HOURS
Qty: 3 INHALER | Refills: 1 | Status: SHIPPED | OUTPATIENT
Start: 2018-10-09 | End: 2019-03-25 | Stop reason: SDUPTHER

## 2018-10-09 RX ORDER — ASPIRIN 81 MG/1
81 TABLET ORAL DAILY
Qty: 90 TAB | Refills: 1 | Status: SHIPPED | OUTPATIENT
Start: 2018-10-09

## 2018-10-09 RX ORDER — ALBUTEROL SULFATE 90 UG/1
2 AEROSOL, METERED RESPIRATORY (INHALATION)
Qty: 3 INHALER | Refills: 1 | Status: SHIPPED | OUTPATIENT
Start: 2018-10-09 | End: 2019-05-30 | Stop reason: SDUPTHER

## 2018-10-09 RX ORDER — FLUTICASONE PROPIONATE 50 MCG
2 SPRAY, SUSPENSION (ML) NASAL DAILY
Qty: 3 BOTTLE | Refills: 1 | Status: SHIPPED | OUTPATIENT
Start: 2018-10-09 | End: 2019-03-19 | Stop reason: SDUPTHER

## 2018-10-09 RX ORDER — SIMVASTATIN 20 MG/1
20 TABLET, FILM COATED ORAL
Qty: 90 TAB | Refills: 1 | Status: SHIPPED | OUTPATIENT
Start: 2018-10-09 | End: 2019-03-19 | Stop reason: SDUPTHER

## 2018-10-09 RX ORDER — DONEPEZIL HYDROCHLORIDE 10 MG/1
10 TABLET, FILM COATED ORAL
Qty: 90 TAB | Refills: 1 | Status: SHIPPED | OUTPATIENT
Start: 2018-10-09 | End: 2019-03-19 | Stop reason: SDUPTHER

## 2018-10-09 RX ORDER — MONTELUKAST SODIUM 10 MG/1
10 TABLET ORAL DAILY
Qty: 90 TAB | Refills: 1 | Status: SHIPPED | OUTPATIENT
Start: 2018-10-09 | End: 2019-03-19 | Stop reason: SDUPTHER

## 2018-10-09 RX ORDER — RANITIDINE 150 MG/1
150 TABLET, FILM COATED ORAL 2 TIMES DAILY
Qty: 180 TAB | Refills: 1 | Status: SHIPPED | OUTPATIENT
Start: 2018-10-09 | End: 2019-03-19 | Stop reason: SDUPTHER

## 2018-10-09 RX ORDER — GABAPENTIN 400 MG/1
400 CAPSULE ORAL 3 TIMES DAILY
Qty: 270 CAP | Refills: 1 | Status: SHIPPED | OUTPATIENT
Start: 2018-10-09 | End: 2019-05-30 | Stop reason: SDUPTHER

## 2018-10-09 RX ORDER — DIVALPROEX SODIUM 500 MG/1
500 TABLET, DELAYED RELEASE ORAL 2 TIMES DAILY
Qty: 180 TAB | Refills: 1 | Status: SHIPPED | OUTPATIENT
Start: 2018-10-09 | End: 2019-03-19 | Stop reason: SDUPTHER

## 2018-10-09 RX ORDER — MEMANTINE HYDROCHLORIDE 28 MG/1
28 CAPSULE, EXTENDED RELEASE ORAL DAILY
Qty: 90 CAP | Refills: 1 | Status: SHIPPED | OUTPATIENT
Start: 2018-10-09 | End: 2019-03-19 | Stop reason: SDUPTHER

## 2018-10-09 RX ORDER — TOPIRAMATE 100 MG/1
100 TABLET, FILM COATED ORAL 2 TIMES DAILY
Qty: 60 TAB | Refills: 2 | Status: SHIPPED | OUTPATIENT
Start: 2018-10-09 | End: 2018-12-18 | Stop reason: SDUPTHER

## 2018-10-09 NOTE — PROGRESS NOTES
Damion Ott is a 56 y.o. female who presents with the following complaints:  Chief Complaint   Patient presents with    Follow Up Chronic Condition     fibromyalgia       Subjective:    HPI:   Presents for f/u of fibromyalgia, asthma, dementia, neck pain, headache. She is taking gabapentin and cymbalta as prescribed and has noted significant improvement in pain. She is still having widespread pain but reports it is greatly improved since last visit. She is tolerating the increased medication well, no adverse effects noted. She did not f/u with neurology for neck pain and scalp paresthesias after her last visit about 1 month ago. Needs referral to neurology as her current neurologist does not participate with her new insurance [facility]). Needs 90 day supplies of all of her medications sent to [facility] mail order pharmacy.     Pertinent PMH/FH/SH:  Past Medical History:   Diagnosis Date    Anal fistula     Asthma     Back ache     CAD (coronary artery disease)     leaky valves    Cholesterol blood decreased     Dementia     Depression     Deviated septum     Diabetes (HCC)     pre diabetese    Fibromyalgia     Frequent headaches     Heart valve disease     Herniated nucleus pulposus, L5-S1     Migraine     Protein S deficiency (HCC)     Pulmonary emboli (HCC)     Pulmonary hypertension (HCC)     Restless leg syndrome     RLS (restless legs syndrome)     Sickle cell trait (HCC)     Sleep apnea     Stroke (HCC)     Upper airway resistance syndrome     Vaginal fistula      Past Surgical History:   Procedure Laterality Date    CARDIAC SURG PROCEDURE UNLIST  9/23/99    closure of unroofed coronary sinus    HX ROTATOR CUFF REPAIR      3/2017     Family History   Problem Relation Age of Onset    Diabetes Mother     Heart Disease Mother     Hypertension Mother     Sickle Cell Anemia Sister     Hypertension Brother     Stroke Maternal Aunt      Social History     Social History    Marital status: SINGLE     Spouse name: N/A    Number of children: N/A    Years of education: N/A     Social History Main Topics    Smoking status: Never Smoker    Smokeless tobacco: Never Used    Alcohol use No    Drug use: No    Sexual activity: Not Currently     Other Topics Concern    None     Social History Narrative     Advanced Directives: N      Patient Active Problem List    Diagnosis    Fibromyalgia    Bilateral carpal tunnel syndrome    Rectal vaginal fistula    Valvular disease    Depression     She sees a psychiatrist she says      Prediabetes    Seasonal allergic rhinitis    Mild intermittent asthma without complication    Unroofed coronary sinus    Pulmonary embolism ([facility] [address]       Nurse notes were reviewed and are correct  Review of Systems - negative except as listed above in the HPI    Objective:     Vitals:    10/09/18 1017   BP: 117/71   Pulse: 60   Resp: 18   Temp: 98.4 °F (36.9 °C)   TempSrc: Oral   SpO2: 98%   Weight: 180 lb 4.8 oz (81.8 kg)   Height: 5' 7\" (1.702 m)     Physical Examination: General appearance - alert, well appearing, and in no distress, oriented to person, place, and time, normal appearing weight and well hydrated  Mental status - normal mood, behavior, speech, dress, motor activity, and thought processes  Neck - supple, no significant adenopathy  Chest - clear to auscultation, no wheezes, rales or rhonchi, symmetric air entry  Heart - normal rate, regular rhythm, normal S1, S2, no murmurs, rubs, clicks or gallops, no JVD  Abdomen - soft, nontender, nondistended, no masses or organomegaly  bowel sounds normal  Neurological - alert, oriented, normal speech, no focal findings or movement disorder noted  Musculoskeletal - generalized musculoskeletal tenderness  Extremities - no pedal edema noted  Skin - normal coloration and turgor, no rashes, no suspicious skin lesions noted    Assessment/ Plan:   Diagnoses and all orders for this visit:    1.  Fibromyalgia  Symptoms improved with cymbalta and better compliance with gabapentin  Continue current rx  Encouraged patient to take advantage of silver sneakers program offered by [facility]- she will contact her patient rep to find out where she can participate in water exercise.  -     gabapentin (NEURONTIN) 400 mg capsule; Take 1 Cap by mouth three (3) times daily.  -     DULoxetine (CYMBALTA) 20 mg capsule; Take 2 Caps by mouth daily. 2. Posterior neck pain  -     methocarbamol (ROBAXIN) 500 mg tablet; Take 1 Tab by mouth nightly as needed. Petra Galicia [facility]rology ref [facility]    3. Post-nasal drainage  -     fluticasone (FLONASE) 50 mcg/actuation nasal spray; 2 Sprays by Both Nostrils route daily. 4. Hypercholesteremia  TLC Diet:  -- Saturated fat <7% of calories, cholesterol <200 mg/day  -- Consider increased viscous (soluble) fiber (10-25 g/day) and plant stanols/sterols  (2g/day) as therapeutic options to enhance LDL lowering  Weight management  Increased physical activity. -     simvastatin (ZOCOR) 20 mg tablet; Take 1 Tab by mouth nightly. 5. Acute bilateral low back pain without sciatica  -     gabapentin (NEURONTIN) 400 mg capsule; Take 1 Cap by mouth three (3) times daily. 6. Moderate persistent asthma without complication  Stable   Continue ICS/LABA  Albuterol prn  -     fluticasone-salmeterol (ADVAIR) 250-50 mcg/dose diskus inhaler; Take 1 Puff by inhalation every twelve (12) hours. -     montelukast (SINGULAIR) 10 mg tablet; Take 1 Tab by mouth daily. -     albuterol (PROVENTIL HFA, VENTOLIN HFA, PROAIR HFA) 90 mcg/actuation inhaler; Take 2 Puffs by inhalation every four (4) hours as needed for Wheezing. 7. Dementia without behavioral disturbance, unspecified dementia type  Continue rx  Encouraged reading, puzzles, word searches to help maintain cognitive functioning    8.  Migraine without aura and without status migrainosus, not intractable  -     [facility] ref [facility]    Other orders  -     donepezil (ARICEPT) 10 mg tablet; Take 1 Tab by mouth nightly. -     PARoxetine (PAXIL) 20 mg tablet; Take 1 Tab by mouth daily. -     topiramate (TOPAMAX) 100 mg tablet; Take 1 Tab by mouth two (2) times a day. -     raNITIdine (ZANTAC) 150 mg tablet; Take 1 Tab by mouth two (2) times a day. -     memantine ER (NAMENDA XR) 28 mg capsule; Take 1 Cap by mouth daily. -     divalproex DR (DEPAKOTE) 500 mg tablet; Take 1 Tab by mouth two (2) times a day. -     aspirin delayed-release 81 mg tablet; Take 1 Tab by mouth daily. Follow-up Disposition: f/u 2 months for annual wellness visit    I have discussed the diagnosis with the patient and the intended plan as seen in the above orders. The patient has received an after-visit summary and questions were answered concerning future plans. The patient verbalizes understanding. Medication Side Effects and Warnings were discussed with patient: yes  Patient Labs were reviewed and or requested: yes  Patient Past Records were reviewed and or requested: yes    Patient Instructions          Neck Pain: Care Instructions  Your Care Instructions    You can have neck pain anywhere from the bottom of your head to the top of your shoulders. It can spread to the upper back or arms. Injuries, painting a ceiling, sleeping with your neck twisted, staying in one position for too long, and many other activities can cause neck pain. Most neck pain gets better with home care. Your doctor may recommend medicine to relieve pain or relax your muscles. He or she may suggest exercise and physical therapy to increase flexibility and relieve stress. You may need to wear a special (cervical) collar to support your neck for a day or two. Follow-up care is a key part of your treatment and safety. Be sure to make and go to all appointments, and call your doctor if you are having problems. It's also a good idea to know your test results and keep a list of the medicines you take.   How can you care for yourself at home? · Try using a heating pad on a low or medium setting for 15 to 20 minutes every 2 or 3 hours. Try a warm shower in place of one session with the heating pad. · You can also try an ice pack for 10 to 15 minutes every 2 to 3 hours. Put a thin cloth between the ice and your skin. · Take pain medicines exactly as directed. ¨ If the doctor gave you a prescription medicine for pain, take it as prescribed. ¨ If you are not taking a prescription pain medicine, ask your doctor if you can take an over-the-counter medicine. · If your doctor recommends a cervical collar, wear it exactly as directed. When should you call for help? Call your doctor now or seek immediate medical care if:    · You have new or worsening numbness in your arms, buttocks or legs.     · You have new or worsening weakness in your arms or legs. (This could make it hard to stand up.)     · You lose control of your bladder or bowels.    Watch closely for changes in your health, and be sure to contact your doctor if:    · Your neck pain is getting worse.     · You are not getting better after 1 week.     · You do not get better as expected. Where can you learn more? Go to http://shell-td.info/. Enter [id number] in the search box to learn more about \"Neck Pain: Care Instructions. \"  Current as of: November 29, 2017  Content Version: 11.8  © 8978-2160 [employer]. Care instructions adapted under license by [employer] (which disclaims liability or warranty for this information). If you have questions about a medical condition or this instruction, always ask your healthcare professional. [address] any warranty or liability for your use of this information. Fibromyalgia: Care Instructions  Your Care Instructions    Fibromyalgia is a painful condition that is not completely understood by medical experts. The cause of fibromyalgia is not known.  It can make you feel tired and ache all over. It causes tender spots at specific points of the body that hurt only when you press on them. You may have trouble sleeping, as well as other symptoms. These problems can upset your work and home life. Symptoms tend to come and go, although they may never go away completely. Fibromyalgia does not harm your muscles, joints, or organs. Follow-up care is a key part of your treatment and safety. Be sure to make and go to all appointments, and call your doctor if you are having problems. It's also a good idea to know your test results and keep a list of the medicines you take. How can you care for yourself at home? · Exercise often. Walk, swim, or bike to help with pain and sleep problems and to make you feel better. · Try to get a good night's sleep. Go to bed and get up at the same time each day, whether you feel rested or not. Make sure you have a good mattress and pillow. · Reduce stress. Avoid things that cause you stress, if you can. If not, work at making them less stressful. Learn to use biofeedback, guided imagery, meditation, or other methods to relax. · Make healthy changes. Eat a balanced diet, quit smoking, and limit alcohol and caffeine. · Use a heating pad set on low or take warm baths or showers for pain. Using cold packs for up to 20 minutes at a time can also relieve pain. Put a thin cloth between the cold pack and your skin. A gentle massage might help too. · Be safe with medicines. Take your medicines exactly as prescribed. Call your doctor if you think you are having a problem with your medicine. Your doctor may talk to you about taking antidepressant medicines. These medicines may improve sleep, relieve pain, and in some cases treat depression. · Learn about fibromyalgia. This makes coping easier. Then, take an active role in your treatment. · Think about joining a support group with others who have fibromyalgia to learn more and get support.   When should you call for help? Watch closely for changes in your health, and be sure to contact your doctor if:    · You feel sad, helpless, or hopeless; lose interest in things you used to enjoy; or have other symptoms of depression.     · Your fibromyalgia symptoms get worse. Where can you learn more? Go to http://shell-td.info/. Enter V003 in the search box to learn more about \"Fibromyalgia: Care Instructions. \"  Current as of: June 4, 2018  Content Version: 11.8  © 8245-3900 [employer]. Care instructions adapted under license by [employer] (which disclaims liability or warranty for this information). If you have questions about a medical condition or this instruction, always ask your healthcare professional. [address] any warranty or liability for your use of this information.           Danny Lao [facility]

## 2018-10-09 NOTE — PROGRESS NOTES
Chief Complaint   Patient presents with    Follow Up Chronic Condition     fibromyalgia     Pt here for follow up with provider for fibromyalgia. Pt states she still has pain, but overall has gotten much better, pt reports pain is not as severe.

## 2018-10-09 NOTE — MR AVS SNAPSHOT
[address] [employer] [facility] [address] 
[phone] Patient: Quoc Tuttle MRN: [id number] :1960 Visit Information Date & Time Provider Department Dept. Phone Encounter #  
 10/9/2018 10:45 AM Tung Gamez NP [address] [facility] [id number] Your Appointments 10/9/2018 10:45 AM  
[place] [address] [facility] ([address]) Appt Note: 3 month follow up [id number]  
[place] [address]  
  
    
 2018 11:40 AM  
Follow Up with Sabrina Lee DO [facility] at [facility] [address] Appt Note: 3 month f/u headache  
 [address] [facility] [address]  
[phone]  
  
   
 [address] [address] Upcoming Health Maintenance Date Due Shingrix Vaccine Age 50> (1 of 2) 2012 MEDICARE YEARLY EXAM 2018 PAP AKA CERVICAL CYTOLOGY 2019 BREAST CANCER SCRN MAMMOGRAM 2020 COLONOSCOPY 3/22/2026 DTaP/Tdap/Td series (2 - Td) 2026 Allergies as of 10/9/2018  Review Complete On: 10/9/2018 By: Jonny Hair LPN Severity Noted Reaction Type Reactions Ambien [Zolpidem]  2016    Unknown (comments) Causes Migraine Cephalexin  2016    Rash Morphine  2015    Rash Motrin [Ibuprofen]  2015    Swelling Nortriptyline  2016    Unknown (comments) Bleeding from mouth Pepcid [Famotidine]  2017    Other (comments) Migraines. Protonix [Pantoprazole]  2016    Rash Vicodin [Hydrocodone-acetaminophen]  2016    Unknown (comments) Causes Migraines Vitamins For Infusion  2016    Unknown (comments) Pt stated all vitamins cause her lightheadness and sick Current Immunizations  Reviewed on 3/13/2018 Name Date Influenza High Dose Vaccine PF 8/15/2016 Influenza Vaccine 8/19/2018, 9/1/2015 Influenza Vaccine (Quad) PF 8/1/2017 Pneumococcal Vaccine (Unspecified Type) 9/1/2015 Tdap 12/13/2016 Zoster Vaccine, Live 6/13/2017 Not reviewed this visit You Were Diagnosed With   
  
 Codes Comments Fibromyalgia    -  Primary ICD-10-CM: M79.7 ICD-9-CM: 729.1 Posterior neck pain     ICD-10-CM: M54.2 ICD-9-CM: 723.1 Post-nasal drainage     ICD-10-CM: R09.82 ICD-9-CM: 473.9 Hypercholesteremia     ICD-10-CM: E78.00 ICD-9-CM: 272.0 Acute bilateral low back pain without sciatica     ICD-10-CM: M54.5 ICD-9-CM: 724.2, 338.19 Moderate persistent asthma without complication     Q-79-KZ: J45.40 ICD-9-CM: 493.90 Dementia without behavioral disturbance, unspecified dementia type     ICD-10-CM: F03.90 ICD-9-CM: 294.20 Migraine without aura and without status migrainosus, not intractable     ICD-10-CM: S69.164 ICD-9-CM: 346.10 Vitals BP Pulse Temp Resp Height(growth percentile) Weight(growth percentile) 117/71 60 98.4 °F (36.9 °C) (Oral) 18 5' 7\" (1.702 m) 180 lb 4.8 oz (81.8 kg) SpO2 BMI OB Status Smoking Status 98% 28.24 kg/m2 Hysterectomy Never Smoker BMI and BSA Data Body Mass Index Body Surface Area  
 28.24 kg/m 2 1.97 m 2 Preferred Pharmacy Pharmacy Name Phone [place] [id number] [address] [phone] Your Updated Medication List  
  
   
This list is accurate as of 10/9/18 10:36 AM.  Always use your most recent med list.  
  
  
  
  
 * albuterol 2.5 mg /3 mL (0.083 %) nebulizer solution Commonly known as:  PROVENTIL VENTOLIN  
by Nebulization route once. * albuterol 2.5 mg /3 mL (0.083 %) nebulizer solution Commonly known as:  PROVENTIL VENTOLIN  
 3 mL by Nebulization route every four (4) hours as needed for Wheezing. * albuterol 90 mcg/actuation inhaler Commonly known as:  PROVENTIL HFA, VENTOLIN HFA, PROAIR HFA Take 2 Puffs by inhalation every four (4) hours as needed for Wheezing. aspirin delayed-release 81 mg tablet Take 1 Tab by mouth daily. aspirin-acetaminophen-caffeine 250-250-65 mg per tablet Commonly known as:  EXCEDRIN ES Take 1 Tab by mouth. Cetirizine 10 mg Cap Take  by mouth. divalproex  mg tablet Commonly known as:  DEPAKOTE Take 1 Tab by mouth two (2) times a day. donepezil 10 mg tablet Commonly known as:  ARICEPT Take 1 Tab by mouth nightly. DULoxetine 20 mg capsule Commonly known as:  CYMBALTA Take 2 Caps by mouth daily. fluticasone 50 mcg/actuation nasal spray Commonly known as:  Roman Sosa 2 Sprays by Both Nostrils route daily. fluticasone-salmeterol 250-50 mcg/dose diskus inhaler Commonly known as:  ADVAIR Take 1 Puff by inhalation every twelve (12) hours. gabapentin 400 mg capsule Commonly known as:  NEURONTIN Take 1 Cap by mouth three (3) times daily. hydrocortisone 2.5 % rectal cream  
Commonly known as:  ANUSOL-HC INSERT INTO RECTUM four times a day  
  
 memantine ER 28 mg capsule Commonly known as:  NAMENDA XR Take 1 Cap by mouth daily. methocarbamol 500 mg tablet Commonly known as:  ROBAXIN Take 1 Tab by mouth nightly as needed. montelukast 10 mg tablet Commonly known as:  SINGULAIR Take 1 Tab by mouth daily. PARoxetine 20 mg tablet Commonly known as:  PAXIL Take 1 Tab by mouth daily. raNITIdine 150 mg tablet Commonly known as:  ZANTAC Take 1 Tab by mouth two (2) times a day. simvastatin 20 mg tablet Commonly known as:  ZOCOR Take 1 Tab by mouth nightly. topiramate 100 mg tablet Commonly known as:  TOPAMAX Take 1 Tab by mouth two (2) times a day. TYLENOL 325 mg tablet Generic drug:  acetaminophen Take  by mouth every four (4) hours as needed for Pain. * Notice: This list has 3 medication(s) that are the same as other medications prescribed for you. Read the directions carefully, and ask your doctor or other care provider to review them with you. Prescriptions Sent to Pharmacy Refills  
 donepezil (ARICEPT) 10 mg tablet 1 Sig: Take 1 Tab by mouth nightly. Class: Normal  
 Pharmacy: [address] [place] [address] Ph #: [phone] Route: Oral  
 PARoxetine (PAXIL) 20 mg tablet 1 Sig: Take 1 Tab by mouth daily. Class: Normal  
 Pharmacy: [address] [place] [address] Ph #: [phone] Route: Oral  
 methocarbamol (ROBAXIN) 500 mg tablet 1 Sig: Take 1 Tab by mouth nightly as needed. Class: Normal  
 Pharmacy: [address] [place] [address] Ph #: [phone] Route: Oral  
 topiramate (TOPAMAX) 100 mg tablet 2 Sig: Take 1 Tab by mouth two (2) times a day. Class: Normal  
 Pharmacy: [address] [place] [address] Ph #: [phone] Route: Oral  
 DULoxetine (CYMBALTA) 20 mg capsule 2 Sig: Take 2 Caps by mouth daily. Class: Normal  
 Pharmacy: [address] [place] [address] Ph #: [phone] Route: Oral  
 fluticasone (FLONASE) 50 mcg/actuation nasal spray 1 Si Sprays by Both Nostrils route daily. Class: Normal  
 Pharmacy: [address] [place] [address] Ph #: [phone] Route: Both Nostrils  
 raNITIdine (ZANTAC) 150 mg tablet 1 Sig: Take 1 Tab by mouth two (2) times a day. Class: Normal  
 Pharmacy: [address] [place] [address] Ph #: [phone]  Route: Oral  
 fluticasone-salmeterol (ADVAIR) 250-50 mcg/dose diskus inhaler 1 Sig: Take 1 Puff by inhalation every twelve (12) hours. Class: Normal  
 Pharmacy: [address] [place] [address] Ph #: [phone] Route: Inhalation  
 simvastatin (ZOCOR) 20 mg tablet 1 Sig: Take 1 Tab by mouth nightly. Class: Normal  
 Pharmacy: [address] [place] [address] Ph #: [phone] Route: Oral  
 memantine ER (NAMENDA XR) 28 mg capsule 1 Sig: Take 1 Cap by mouth daily. Class: Normal  
 Pharmacy: [address] [place] [address] Ph #: [phone] Route: Oral  
 montelukast (SINGULAIR) 10 mg tablet 1 Sig: Take 1 Tab by mouth daily. Class: Normal  
 Pharmacy: [address] [place] [address] Ph #: [phone] Route: Oral  
 gabapentin (NEURONTIN) 400 mg capsule 1 Sig: Take 1 Cap by mouth three (3) times daily. Class: Normal  
 Pharmacy: [address] [place] [address] Ph #: [phone] Route: Oral  
 divalproex DR (DEPAKOTE) 500 mg tablet 1 Sig: Take 1 Tab by mouth two (2) times a day. Class: Normal  
 Pharmacy: [address] [place] [address] Ph #: [phone] Route: Oral  
 albuterol (PROVENTIL HFA, VENTOLIN HFA, PROAIR HFA) 90 mcg/actuation inhaler 1 Sig: Take 2 Puffs by inhalation every four (4) hours as needed for Wheezing. Class: Normal  
 Pharmacy: [address] [place] [address] Ph #: [phone] Route: Inhalation  
 aspirin delayed-release 81 mg tablet 1 Sig: Take 1 Tab by mouth daily. Class: Normal  
 Pharmacy: [address] [place] [address] Ph #: [phone] Route: Oral  
  
We Performed the Following REFERRAL TO NEUROLOGY [XEL02 Custom] Comments: lee for headache, transferring care from Dr Chaz Vanegas due to insurance change Referral Information Referral ID Referred By Referred To  
  
 [id number] Jay Pandya MD   
   [place] [address] [address] Phone: [phone] Fax: [phone] Visits Status Start Date End Date 1 New Request 10/9/18 10/9/19 If your referral has a status of pending review or denied, additional information will be sent to support the outcome of this decision. Patient Instructions Neck Pain: Care Instructions Your Care Instructions You can have neck pain anywhere from the bottom of your head to the top of your shoulders. It can spread to the upper back or arms. Injuries, painting a ceiling, sleeping with your neck twisted, staying in one position for too long, and many other activities can cause neck pain. Most neck pain gets better with home care. Your doctor may recommend medicine to relieve pain or relax your muscles. He or she may suggest exercise and physical therapy to increase flexibility and relieve stress. You may need to wear a special (cervical) collar to support your neck for a day or two. Follow-up care is a key part of your treatment and safety. Be sure to make and go to all appointments, and call your doctor if you are having problems. It's also a good idea to know your test results and keep a list of the medicines you take. How can you care for yourself at home? · Try using a heating pad on a low or medium setting for 15 to 20 minutes every 2 or 3 hours. Try a warm shower in place of one session with the heating pad. · You can also try an ice pack for 10 to 15 minutes every 2 to 3 hours. Put a thin cloth between the ice and your skin. · Take pain medicines exactly as directed. ¨ If the doctor gave you a prescription medicine for pain, take it as prescribed. ¨ If you are not taking a prescription pain medicine, ask your doctor if you can take an over-the-counter medicine. · If your doctor recommends a cervical collar, wear it exactly as directed. When should you call for help? Call your doctor now or seek immediate medical care if: 
  · You have new or worsening numbness in your arms, buttocks or legs.  
  · You have new or worsening weakness in your arms or legs. (This could make it hard to stand up.)  
  · You lose control of your bladder or bowels.  
 Watch closely for changes in your health, and be sure to contact your doctor if: 
  · Your neck pain is getting worse.  
  · You are not getting better after 1 week.  
  · You do not get better as expected. Where can you learn more? Go to http://shell-td.info/. Enter [id number] in the search box to learn more about \"Neck Pain: Care Instructions. \" Current as of: November 29, 2017 Content Version: 11.8 © 1117-5742 [employer]. Care instructions adapted under license by [employer] (which disclaims liability or warranty for this information). If you have questions about a medical condition or this instruction, always ask your healthcare professional. [address] any warranty or liability for your use of this information. Fibromyalgia: Care Instructions Your Care Instructions Fibromyalgia is a painful condition that is not completely understood by medical experts. The cause of fibromyalgia is not known. It can make you feel tired and ache all over. It causes tender spots at specific points of the body that hurt only when you press on them. You may have trouble sleeping, as well as other symptoms. These problems can upset your work and home life. Symptoms tend to come and go, although they may never go away completely. Fibromyalgia does not harm your muscles, joints, or organs. Follow-up care is a key part of your treatment and safety. Be sure to make and go to all appointments, and call your doctor if you are having problems. It's also a good idea to know your test results and keep a list of the medicines you take. How can you care for yourself at home? · Exercise often. Walk, swim, or bike to help with pain and sleep problems and to make you feel better. · Try to get a good night's sleep. Go to bed and get up at the same time each day, whether you feel rested or not. Make sure you have a good mattress and pillow. · Reduce stress. Avoid things that cause you stress, if you can. If not, work at making them less stressful. Learn to use biofeedback, guided imagery, meditation, or other methods to relax. · Make healthy changes. Eat a balanced diet, quit smoking, and limit alcohol and caffeine. · Use a heating pad set on low or take warm baths or showers for pain. Using cold packs for up to 20 minutes at a time can also relieve pain. Put a thin cloth between the cold pack and your skin. A gentle massage might help too. · Be safe with medicines. Take your medicines exactly as prescribed. Call your doctor if you think you are having a problem with your medicine. Your doctor may talk to you about taking antidepressant medicines. These medicines may improve sleep, relieve pain, and in some cases treat depression. · Learn about fibromyalgia. This makes coping easier. Then, take an active role in your treatment. · Think about joining a support group with others who have fibromyalgia to learn more and get support. When should you call for help? Watch closely for changes in your health, and be sure to contact your doctor if: 
  · You feel sad, helpless, or hopeless; lose interest in things you used to enjoy; or have other symptoms of depression.  
  · Your fibromyalgia symptoms get worse. Where can you learn more? Go to http://shell-td.info/. Enter V003 in the search box to learn more about \"Fibromyalgia: Care Instructions. \" Current as of: June 4, 2018 Content Version: 11.8 © 5736-7238 [employer]wise, [employer]. Care instructions adapted under license by [employer] (which disclaims liability or warranty for this information). If you have questions about a medical condition or this instruction, always ask your healthcare professional. [address] any warranty or liability for your use of this information. Introducing [facility]! [facility] introduces [employer]se [employer] patient portal. Now you can access parts of your medical record, email your doctor's office, and request medication refills online. 1. In your internet browser, go to [email or web address] 2. Click on the First Time User? Click Here link in the Sign In box. You will see the New Member Sign Up page. 3. Enter your Bosse [employer] Access Code exactly as it appears below. You will not need to use this code after youve completed the sign-up process. If you do not sign up before the expiration date, you must request a new code. · [employer] Access Code: [email or web address] Expires: 1/7/2019 10:36 AM 
 
4. Enter the last four digits of your Social Security Number (xxxx) and Date of Birth (mm/dd/yyyy) as indicated and click Submit. You will be taken to the next sign-up page. 5. Create a Bosse Tools ID. This will be your Bosse Tools login ID and cannot be changed, so think of one that is secure and easy to remember. 6. Create a [employer] password. You can change your password at any time. 7. Enter your Password Reset Question and Answer. This can be used at a later time if you forget your password. 8. Enter your e-mail address. You will receive e-mail notification when new information is available in [address] 9. Click Sign Up. You can now view and download portions of your medical record. 10. Click the Download Summary menu link to download a portable copy of your medical information. If you have questions, please visit the Frequently Asked Questions section of the Fliptu website. Remember, Fliptu is NOT to be used for urgent needs. For medical emergencies, dial 911. Now available from your iPhone and Android! Please provide this summary of care documentation to your next provider. Your primary care clinician is listed as Kemi Cook. If you have any questions after today's visit, please call [phone].

## 2018-10-09 NOTE — PATIENT INSTRUCTIONS
Neck Pain: Care Instructions  Your Care Instructions    You can have neck pain anywhere from the bottom of your head to the top of your shoulders. It can spread to the upper back or arms. Injuries, painting a ceiling, sleeping with your neck twisted, staying in one position for too long, and many other activities can cause neck pain. Most neck pain gets better with home care. Your doctor may recommend medicine to relieve pain or relax your muscles. He or she may suggest exercise and physical therapy to increase flexibility and relieve stress. You may need to wear a special (cervical) collar to support your neck for a day or two. Follow-up care is a key part of your treatment and safety. Be sure to make and go to all appointments, and call your doctor if you are having problems. It's also a good idea to know your test results and keep a list of the medicines you take. How can you care for yourself at home? · Try using a heating pad on a low or medium setting for 15 to 20 minutes every 2 or 3 hours. Try a warm shower in place of one session with the heating pad. · You can also try an ice pack for 10 to 15 minutes every 2 to 3 hours. Put a thin cloth between the ice and your skin. · Take pain medicines exactly as directed. ¨ If the doctor gave you a prescription medicine for pain, take it as prescribed. ¨ If you are not taking a prescription pain medicine, ask your doctor if you can take an over-the-counter medicine. · If your doctor recommends a cervical collar, wear it exactly as directed. When should you call for help? Call your doctor now or seek immediate medical care if:    · You have new or worsening numbness in your arms, buttocks or legs.     · You have new or worsening weakness in your arms or legs.  (This could make it hard to stand up.)     · You lose control of your bladder or bowels.    Watch closely for changes in your health, and be sure to contact your doctor if:    · Your neck pain is getting worse.     · You are not getting better after 1 week.     · You do not get better as expected. Where can you learn more? Go to http://shell-td.info/. Enter [id number] in the search box to learn more about \"Neck Pain: Care Instructions. \"  Current as of: November 29, 2017  Content Version: 11.8  © 3264-6445 [employer]. Care instructions adapted under license by [employer] (which disclaims liability or warranty for this information). If you have questions about a medical condition or this instruction, always ask your healthcare professional. [place] [address] any warranty or liability for your use of this information. Fibromyalgia: Care Instructions  Your Care Instructions    Fibromyalgia is a painful condition that is not completely understood by medical experts. The cause of fibromyalgia is not known. It can make you feel tired and ache all over. It causes tender spots at specific points of the body that hurt only when you press on them. You may have trouble sleeping, as well as other symptoms. These problems can upset your work and home life. Symptoms tend to come and go, although they may never go away completely. Fibromyalgia does not harm your muscles, joints, or organs. Follow-up care is a key part of your treatment and safety. Be sure to make and go to all appointments, and call your doctor if you are having problems. It's also a good idea to know your test results and keep a list of the medicines you take. How can you care for yourself at home? · Exercise often. Walk, swim, or bike to help with pain and sleep problems and to make you feel better. · Try to get a good night's sleep. Go to bed and get up at the same time each day, whether you feel rested or not. Make sure you have a good mattress and pillow. · Reduce stress. Avoid things that cause you stress, if you can. If not, work at making them less stressful.  Learn to use biofeedback, guided imagery, meditation, or other methods to relax. · Make healthy changes. Eat a balanced diet, quit smoking, and limit alcohol and caffeine. · Use a heating pad set on low or take warm baths or showers for pain. Using cold packs for up to 20 minutes at a time can also relieve pain. Put a thin cloth between the cold pack and your skin. A gentle massage might help too. · Be safe with medicines. Take your medicines exactly as prescribed. Call your doctor if you think you are having a problem with your medicine. Your doctor may talk to you about taking antidepressant medicines. These medicines may improve sleep, relieve pain, and in some cases treat depression. · Learn about fibromyalgia. This makes coping easier. Then, take an active role in your treatment. · Think about joining a support group with others who have fibromyalgia to learn more and get support. When should you call for help? Watch closely for changes in your health, and be sure to contact your doctor if:    · You feel sad, helpless, or hopeless; lose interest in things you used to enjoy; or have other symptoms of depression.     · Your fibromyalgia symptoms get worse. Where can you learn more? Go to http://shell-td.info/. Enter V003 in the search box to learn more about \"Fibromyalgia: Care Instructions. \"  Current as of: June 4, 2018  Content Version: 11.8  © 9802-6518 [employer]. Care instructions adapted under license by [employer] (which disclaims liability or warranty for this information). If you have questions about a medical condition or this instruction, always ask your healthcare professional. [place] [address] any warranty or liability for your use of this information.

## 2018-10-12 ENCOUNTER — TELEPHONE (OUTPATIENT)
Dept: FAMILY MEDICINE CLINIC | Age: 58
End: 2018-10-12

## 2018-10-18 RX ORDER — DULOXETINE 40 MG/1
40 CAPSULE, DELAYED RELEASE ORAL DAILY
Qty: 90 CAP | Refills: 1 | Status: SHIPPED | OUTPATIENT
Start: 2018-10-18 | End: 2019-03-11 | Stop reason: SDUPTHER

## 2018-10-18 NOTE — TELEPHONE ENCOUNTER
Patient is calling about her medications going out to [facility] [phone]    Please advise thanks

## 2018-10-19 NOTE — TELEPHONE ENCOUNTER
Returned call to pt to advise that number provided for [facility] [employer] was incorrect. Pt states there is no longer a need to contact them because the medication arrived to her yesterday.

## 2018-10-21 RX ORDER — SUMATRIPTAN 50 MG/1
50 TABLET, FILM COATED ORAL
Qty: 9 TAB | Refills: 3 | OUTPATIENT
Start: 2018-10-21 | End: 2018-10-21

## 2018-11-06 ENCOUNTER — OFFICE VISIT (OUTPATIENT)
Dept: NEUROLOGY | Age: 58
End: 2018-11-06

## 2018-11-06 VITALS
SYSTOLIC BLOOD PRESSURE: 108 MMHG | WEIGHT: 180 LBS | DIASTOLIC BLOOD PRESSURE: 74 MMHG | HEART RATE: 66 BPM | BODY MASS INDEX: 28.19 KG/M2 | RESPIRATION RATE: 18 BRPM | OXYGEN SATURATION: 94 %

## 2018-11-06 DIAGNOSIS — F32.A DEPRESSION, UNSPECIFIED DEPRESSION TYPE: ICD-10-CM

## 2018-11-06 DIAGNOSIS — G43.009 MIGRAINE WITHOUT AURA AND WITHOUT STATUS MIGRAINOSUS, NOT INTRACTABLE: Primary | ICD-10-CM

## 2018-11-06 DIAGNOSIS — M79.7 FIBROMYALGIA: ICD-10-CM

## 2018-11-06 NOTE — PROGRESS NOTES
Neurology Clinic Follow up Note    Patient ID:  John Paul Hannah  [id number]  20 y.o.  1960      Ms. Ilana Cardoza is here for follow up today of migraines. Last Appointment With Me:  8/7/18      Interval History:   Headaches have been doing well. HA frequency per month is 2-3x. She is using tylenol and/or Imitrex for rescue tx which works well for her. HA located over L temple, lasting 2-3 hours no nausea/vomiting or photophobia. She remains on TPM 100mg BID and VPA 500mg BID and Cymbalta 40mg/d. No reported side effects. PMHx/ PSHx/ FHx/ SHx:  Reviewed and unchanged previous visit. Past Medical History:   Diagnosis Date    Anal fistula     Asthma     Back ache     CAD (coronary artery disease)     leaky valves    Cholesterol blood decreased     Dementia     Depression     Deviated septum     Diabetes (HCC)     pre diabetese    Fibromyalgia     Frequent headaches     Heart valve disease     Herniated nucleus pulposus, L5-S1     Migraine     Protein S deficiency (HCC)     Pulmonary emboli (HCC)     Pulmonary hypertension (HCC)     Restless leg syndrome     RLS (restless legs syndrome)     Sickle cell trait (HCC)     Sleep apnea     Stroke (HCC)     Upper airway resistance syndrome     Vaginal fistula          ROS:  Comprehensive review of systems negative except for as noted above. Objective:       Meds:  Current Outpatient Medications   Medication Sig Dispense Refill    DULoxetine 40 mg cpDR Take 40 mg by mouth daily. 90 Cap 1    donepezil (ARICEPT) 10 mg tablet Take 1 Tab by mouth nightly. 90 Tab 1    PARoxetine (PAXIL) 20 mg tablet Take 1 Tab by mouth daily. 90 Tab 1    methocarbamol (ROBAXIN) 500 mg tablet Take 1 Tab by mouth nightly as needed. 90 Tab 1    topiramate (TOPAMAX) 100 mg tablet Take 1 Tab by mouth two (2) times a day. 60 Tab 2    fluticasone (FLONASE) 50 mcg/actuation nasal spray 2 Sprays by Both Nostrils route daily.  3 Bottle 1    raNITIdine (ZANTAC) 150 mg tablet Take 1 Tab by mouth two (2) times a day. 180 Tab 1    fluticasone-salmeterol (ADVAIR) 250-50 mcg/dose diskus inhaler Take 1 Puff by inhalation every twelve (12) hours. 3 Inhaler 1    simvastatin (ZOCOR) 20 mg tablet Take 1 Tab by mouth nightly. 90 Tab 1    memantine ER (NAMENDA XR) 28 mg capsule Take 1 Cap by mouth daily. 90 Cap 1    montelukast (SINGULAIR) 10 mg tablet Take 1 Tab by mouth daily. 90 Tab 1    gabapentin (NEURONTIN) 400 mg capsule Take 1 Cap by mouth three (3) times daily. 270 Cap 1    divalproex DR (DEPAKOTE) 500 mg tablet Take 1 Tab by mouth two (2) times a day. 180 Tab 1    albuterol (PROVENTIL HFA, VENTOLIN HFA, PROAIR HFA) 90 mcg/actuation inhaler Take 2 Puffs by inhalation every four (4) hours as needed for Wheezing. 3 Inhaler 1    aspirin delayed-release 81 mg tablet Take 1 Tab by mouth daily. 90 Tab 1    aspirin-acetaminophen-caffeine (EXCEDRIN ES) 250-250-65 mg per tablet Take 1 Tab by mouth.  acetaminophen (TYLENOL) 325 mg tablet Take  by mouth every four (4) hours as needed for Pain.  albuterol (PROVENTIL VENTOLIN) 2.5 mg /3 mL (0.083 %) nebulizer solution 3 mL by Nebulization route every four (4) hours as needed for Wheezing. (Patient not taking: Reported on 10/9/2018) 25 Each 2    Cetirizine 10 mg cap Take  by mouth.  albuterol (PROVENTIL VENTOLIN) 2.5 mg /3 mL (0.083 %) nebulizer solution by Nebulization route once. Exam:  Visit Vitals  /74   Pulse 66   Resp 18   Wt 81.6 kg (180 lb)   SpO2 94%   BMI 28.19 kg/m²     NEUROLOGICAL EXAM:  General: Awake, alert, speech fluent. CN: PERRL, EOMI without nystagmus, VFF to confrontation, facial sensation and strength are normal and symmetric, hearing is intact to finger rub bilaterally, palate and tongue movements are intact and symmetric. Motor: Normal tone, bulk and strength bilaterally. Reflexes: 2/4 and symmetric, plantar stimulation is flexor.   Coordination: FNF, MAMADOU, HTS intact. Sensation: LT intact throughout. Gait: Normal-based and steady. LABS  Results for orders placed or performed in visit on 60/26/28   METABOLIC PANEL, COMPREHENSIVE   Result Value Ref Range    Glucose 89 65 - 99 mg/dL    BUN 14 6 - 24 mg/dL    Creatinine 0.91 0.57 - 1.00 mg/dL    GFR est non-AA 71 >59 mL/min/1.73    GFR est AA 82 >59 mL/min/1.73    BUN/Creatinine ratio 15 9 - 23    Sodium 146 (H) 134 - 144 mmol/L    Potassium 4.4 3.5 - 5.2 mmol/L    Chloride 110 (H) 96 - 106 mmol/L    CO2 22 20 - 29 mmol/L    Calcium 9.3 8.7 - 10.2 mg/dL    Protein, total 7.0 6.0 - 8.5 g/dL    Albumin 4.3 3.5 - 5.5 g/dL    GLOBULIN, TOTAL 2.7 1.5 - 4.5 g/dL    A-G Ratio 1.6 1.2 - 2.2    Bilirubin, total 0.3 0.0 - 1.2 mg/dL    Alk. phosphatase 100 39 - 117 IU/L    AST (SGOT) 22 0 - 40 IU/L    ALT (SGPT) 13 0 - 32 IU/L     Lab Results   Component Value Date/Time    Sodium 146 (H) 08/07/2018 10:13 AM    Potassium 4.4 08/07/2018 10:13 AM    Chloride 110 (H) 08/07/2018 10:13 AM    CO2 22 08/07/2018 10:13 AM    Glucose 89 08/07/2018 10:13 AM    BUN 14 08/07/2018 10:13 AM    Creatinine 0.91 08/07/2018 10:13 AM    BUN/Creatinine ratio 15 08/07/2018 10:13 AM    GFR est AA 82 08/07/2018 10:13 AM    GFR est non-AA 71 08/07/2018 10:13 AM    Calcium 9.3 08/07/2018 10:13 AM    Bilirubin, total 0.3 08/07/2018 10:13 AM    AST (SGOT) 22 08/07/2018 10:13 AM    Alk. phosphatase 100 08/07/2018 10:13 AM    Protein, total 7.0 08/07/2018 10:13 AM    Albumin 4.3 08/07/2018 10:13 AM    A-G Ratio 1.6 08/07/2018 10:13 AM    ALT (SGPT) 13 08/07/2018 10:13 AM       IMAGING:  MRI Results (most recent):  No results found for this or any previous visit. Assessment:     Encounter Diagnoses     ICD-10-CM ICD-9-CM   1. Migraine without aura and without status migrainosus, not intractable G43.009 346.10   2. Depression, unspecified depression type F32.9 311   3.  Fibromyalgia M79.7 67.1      56year old AAF with a h/o depression, fibromyalgia here for f/u of chronic migraines since age 17, L hemispheric refractory to multiple preventative medications. She has failed TCA therapy in the past due to side effects. TPM and VPA for migraine ppx appeared to be effective initially, however she experienced increasing frequency of migraines after aminta a viral illness in [place]. HAs were exacerbated by daily use of abortive therapy for several weeks likely causing some degree of rebound headache as well. Migraine severity/frequency much improved with addition of Cymbalta. No reported side effects with current preventatives. Will continue titration of Cymbalta to assist with headaches as well as fibromyalgia. Headache education  Discussed treatment options, both abortive and preventive medications. Instructed patient about medications and potential side effects. Discussed medication overuse headache and to limit use of analgesics to less than 2 doses per week. Plan:   Cont. TPM 100mg BID, VPA 500mg BID, Cymbalta 40mg/d for HA ppx. Imitrex 50mg PRN for abortive migraine tx, limit use to no more than twice weekly. May use tylenol sparingly for less severe headaches. Follow-up Disposition:  Return in about 6 months (around 5/6/2019).       Signed:  Kiersten Sterling,   11/6/2018

## 2018-11-06 NOTE — PATIENT INSTRUCTIONS
A Healthy Lifestyle: Care Instructions  Your Care Instructions    A healthy lifestyle can help you feel good, stay at a healthy weight, and have plenty of energy for both work and play. A healthy lifestyle is something you can share with your whole family. A healthy lifestyle also can lower your risk for serious health problems, such as high blood pressure, heart disease, and diabetes. You can follow a few steps listed below to improve your health and the health of your family. Follow-up care is a key part of your treatment and safety. Be sure to make and go to all appointments, and call your doctor if you are having problems. It's also a good idea to know your test results and keep a list of the medicines you take. How can you care for yourself at home? · Do not eat too much sugar, fat, or fast foods. You can still have dessert and treats now and then. The goal is moderation. · Start small to improve your eating habits. Pay attention to portion sizes, drink less juice and soda pop, and eat more fruits and vegetables. ? Eat a healthy amount of food. A 3-ounce serving of meat, for example, is about the size of a deck of cards. Fill the rest of your plate with vegetables and whole grains. ? Limit the amount of soda and sports drinks you have every day. Drink more water when you are thirsty. ? Eat at least 5 servings of fruits and vegetables every day. It may seem like a lot, but it is not hard to reach this goal. A serving or helping is 1 piece of fruit, 1 cup of vegetables, or 2 cups of leafy, raw vegetables. Have an apple or some carrot sticks as an afternoon snack instead of a candy bar. Try to have fruits and/or vegetables at every meal.  · Make exercise part of your daily routine. You may want to start with simple activities, such as walking, bicycling, or slow swimming. Try to be active 30 to 60 minutes every day. You do not need to do all 30 to 60 minutes all at once.  For example, you can exercise 3 times a day for 10 or 20 minutes. Moderate exercise is safe for most people, but it is always a good idea to talk to your doctor before starting an exercise program.  · Keep moving. [place]a [place] the lawn, work in the garden, or [employer]. Take the stairs instead of the elevator at work. · If you smoke, quit. People who smoke have an increased risk for heart attack, stroke, cancer, and other lung illnesses. Quitting is hard, but there are ways to boost your chance of quitting tobacco for good. ? Use nicotine gum, patches, or lozenges. ? Ask your doctor about stop-smoking programs and medicines. ? Keep trying. In addition to reducing your risk of diseases in the future, you will notice some benefits soon after you stop using tobacco. If you have shortness of breath or asthma symptoms, they will likely get better within a few weeks after you quit. · Limit how much alcohol you drink. Moderate amounts of alcohol (up to 2 drinks a day for men, 1 drink a day for women) are okay. But drinking too much can lead to liver problems, high blood pressure, and other health problems. Family health  If you have a family, there are many things you can do together to improve your health. · Eat meals together as a family as often as possible. · Eat healthy foods. This includes fruits, vegetables, lean meats and dairy, and whole grains. · Include your family in your fitness plan. Most people think of activities such as jogging or tennis as the way to fitness, but there are many ways you and your family can be more active. Anything that makes you breathe hard and gets your heart pumping is exercise. Here are some tips:  ? Walk to do errands or to take your child to school or the bus.  ? Go for a family bike ride after dinner instead of watching TV. Where can you learn more? Go to http://shell-td.info/. Enter I936 in the search box to learn more about \"A Healthy Lifestyle: Care Instructions. \"  Current as of: December 7, 2017  Content Version: 11.8  © 2743-4761 Healthwise, [employer]. Care instructions adapted under license by [employer] (which disclaims liability or warranty for this information). If you have questions about a medical condition or this instruction, always ask your healthcare professional. [address] any warranty or liability for your use of this information. [facility]   Statement to Patients  April 1, 2014      In an effort to ensure the large volume of patient prescription refills is processed in the most efficient and expeditious manner, we are asking our patients to assist us by calling your Pharmacy for all prescription refills, this will include also your  Mail Order Pharmacy. The pharmacy will contact our office electronically to continue the refill process. Please do not wait until the last minute to call your pharmacy. We need at least 48 hours (2days) to fill prescriptions. We also encourage you to call your pharmacy before going to  your prescription to make sure it is ready. With regard to controlled substance prescription refill requests (narcotic refills) that need to be picked up at our office, we ask your cooperation by providing us with at least 72 hours (3days) notice that you will need a refill. We will not refill narcotic prescription refill requests after 4:00pm on any weekday, Monday through Thursday, or after 2:00pm on Fridays, or on the weekends. We encourage everyone to explore another way of getting your prescription refill request processed using [email or web address], our patient web portal through our electronic medical record system. [email or web address] is an efficient and effective way to communicate your medication request directly to the office and  downloadable as an frantz on your smart phone .  [email or web address] also features a review functionality that allows you to view your medication list as well as leave messages for your physician. Are you ready to get connected? If so please review the attatched instructions or speak to any of our staff to get you set up right away! Thank you so much for your cooperation. Should you have any questions please contact our Practice Administrator.     The Physicians and Staff,  [facility]

## 2018-11-07 ENCOUNTER — OFFICE VISIT (OUTPATIENT)
Dept: FAMILY MEDICINE CLINIC | Age: 58
End: 2018-11-07

## 2018-11-07 VITALS
SYSTOLIC BLOOD PRESSURE: 132 MMHG | HEIGHT: 67 IN | OXYGEN SATURATION: 98 % | RESPIRATION RATE: 18 BRPM | WEIGHT: 176.4 LBS | TEMPERATURE: 98 F | HEART RATE: 56 BPM | DIASTOLIC BLOOD PRESSURE: 89 MMHG | BODY MASS INDEX: 27.69 KG/M2

## 2018-11-07 DIAGNOSIS — N89.8 VAGINAL DISCHARGE: ICD-10-CM

## 2018-11-07 DIAGNOSIS — L30.9 ECZEMA, UNSPECIFIED TYPE: ICD-10-CM

## 2018-11-07 DIAGNOSIS — L30.9 DERMATITIS: Primary | ICD-10-CM

## 2018-11-07 RX ORDER — FLUCONAZOLE 150 MG/1
150 TABLET ORAL DAILY
Qty: 1 TAB | Refills: 0 | Status: SHIPPED | OUTPATIENT
Start: 2018-11-07 | End: 2018-11-08

## 2018-11-07 RX ORDER — FLUCONAZOLE 150 MG/1
150 TABLET ORAL DAILY
Qty: 1 TAB | Refills: 0 | Status: SHIPPED | OUTPATIENT
Start: 2018-11-07 | End: 2018-11-07 | Stop reason: SDUPTHER

## 2018-11-07 RX ORDER — HYDROCORTISONE 25 MG/G
CREAM TOPICAL 2 TIMES DAILY
Qty: 30 G | Refills: 0 | Status: SHIPPED | OUTPATIENT
Start: 2018-11-07 | End: 2018-11-07 | Stop reason: SDUPTHER

## 2018-11-07 RX ORDER — HYDROCORTISONE 25 MG/G
CREAM TOPICAL 2 TIMES DAILY
Qty: 30 G | Refills: 0 | Status: SHIPPED | OUTPATIENT
Start: 2018-11-07 | End: 2019-03-19 | Stop reason: SDUPTHER

## 2018-11-07 NOTE — PATIENT INSTRUCTIONS
Atopic Dermatitis: Care Instructions  Your Care Instructions  Atopic dermatitis (also called eczema) is a skin problem that causes intense itching and a red, raised rash. In severe cases, the rash develops clear fluid-filled blisters. The rash is not contagious. People with this condition seem to have very sensitive immune systems that are likely to react to things that cause allergies. The immune system is the body's way of fighting infection. There is no cure for atopic dermatitis, but you may be able to control it with care at home. Follow-up care is a key part of your treatment and safety. Be sure to make and go to all appointments, and call your doctor if you are having problems. It's also a good idea to know your test results and keep a list of the medicines you take. How can you care for yourself at home? · Use moisturizer at least twice a day. · If your doctor prescribes a cream, use it as directed. If your doctor prescribes other medicine, take it exactly as directed. · Wash the affected area with water only. Soap can make dryness and itching worse. Pat dry. · Apply a moisturizer after bathing. Use a cream such as Lubriderm, Moisturel, or Cetaphil that does not irritate the skin or cause a rash. Apply the cream while your skin is still damp after lightly drying with a towel. · Use cold, wet cloths to reduce itching. · Keep cool, and stay out of the sun. · If itching affects your normal activities, an over-the-counter antihistamine, such as diphenhydramine (Benadryl) or loratadine (Claritin) may help. Read and follow all instructions on the label. When should you call for help? Call your doctor now or seek immediate medical care if:    · Your rash gets worse and you have a fever.     · You have new blisters or bruises, or the rash spreads and looks like a sunburn.     · You have signs of infection, such as:  ? Increased pain, swelling, warmth, or redness.   ? Red streaks leading from the rash.  ? Pus draining from the rash. ? A fever.     · You have crusting or oozing sores.     · You have joint aches or body aches along with your rash.    Watch closely for changes in your health, and be sure to contact your doctor if:    · Your rash does not clear up after 2 to 3 weeks of home treatment.     · Itching interferes with your sleep or daily activities. Where can you learn more? Go to http://shell-td.info/. Enter Q016 in the search box to learn more about \"Atopic Dermatitis: Care Instructions. \"  Current as of: April 18, 2018  Content Version: 11.8  © 0554-5133 [employer]. Care instructions adapted under license by [employer] (which disclaims liability or warranty for this information). If you have questions about a medical condition or this instruction, always ask your healthcare professional. [place] [address] any warranty or liability for your use of this information. Vaginitis: Care Instructions  Your Care Instructions    Vaginitis is soreness or infection of the vagina. This common problem can cause itching and burning. And it can cause a change in vaginal discharge. Sometimes it can cause pain during sex. Vaginitis may be caused by bacteria, yeast, or other germs. Some infections that cause it are caught from a sexual partner. Bath products, spermicides, and douches can irritate the vagina too. Some women have this problem during and after menopause. A drop in estrogen levels during this time can cause dryness, soreness, and pain during sex. Your doctor can give you medicine to treat an infection. And home care may help you feel better. For certain types of infections, your sex partner must be treated too. Follow-up care is a key part of your treatment and safety. Be sure to make and go to all appointments, and call your doctor if you are having problems.  It's also a good idea to know your test results and keep a list of the medicines you take. How can you care for yourself at home? · If your doctor prescribed antibiotics, take them as directed. Do not stop taking them just because you feel better. You need to take the full course of antibiotics. · Take your medicines exactly as prescribed. Call your doctor if you think you are having a problem with your medicine. · Do not eat or drink anything that has alcohol if you are taking metronidazole (Flagyl). · If you have a yeast infection, use over-the-counter products as your doctor tells you to. Or take medicine your doctor prescribes exactly as directed. · Wash your vaginal area daily with water. You also can use a mild, unscented soap if you want. · Do not use scented bath products. And do not use vaginal sprays or douches. · Put a washcloth soaked in cool water on the area to relieve itching. Or you can take cool baths. · If you have dryness because of menopause, use estrogen cream or pills that your doctor prescribes. · Ask your doctor about when it is okay to have sex. · Use a personal lubricant before sex if you have dryness. Examples are Astroglide, K-Y Jelly, and Wet Lubricant Gel. · Ask your doctor if your sex partner also needs treatment. When should you call for help? Call your doctor now or seek immediate medical care if:    · You have a fever and pelvic pain.    Watch closely for changes in your health, and be sure to contact your doctor if:    · You have bleeding other than your period.     · You do not get better as expected. Where can you learn more? Go to http://shell-td.info/. Enter R921 in the search box to learn more about \"Vaginitis: Care Instructions. \"  Current as of: May 15, 2018  Content Version: 11.8  © 2319-5687 Healthwise, [employer]. Care instructions adapted under license by [employer] (which disclaims liability or warranty for this information).  If you have questions about a medical condition or this instruction, always ask your healthcare professional. [place] [address] any warranty or liability for your use of this information.

## 2018-11-10 LAB
A VAGINAE DNA VAG QL NAA+PROBE: ABNORMAL SCORE
BVAB2 DNA VAG QL NAA+PROBE: ABNORMAL SCORE
C ALBICANS DNA VAG QL NAA+PROBE: NEGATIVE
C GLABRATA DNA VAG QL NAA+PROBE: NEGATIVE
C TRACH RRNA SPEC QL NAA+PROBE: NEGATIVE
MEGA1 DNA VAG QL NAA+PROBE: ABNORMAL SCORE
N GONORRHOEA RRNA SPEC QL NAA+PROBE: NEGATIVE
T VAGINALIS RRNA SPEC QL NAA+PROBE: NEGATIVE

## 2018-11-14 RX ORDER — FLUCONAZOLE 150 MG/1
TABLET ORAL
Qty: 1 TAB | Refills: 0 | OUTPATIENT
Start: 2018-11-14

## 2018-11-14 NOTE — PROGRESS NOTES
There is some evidence of bacterial vaginosis on the swab. If she is still having symptoms I'd be in favor of treating with flagyl for this.

## 2018-11-15 NOTE — TELEPHONE ENCOUNTER
Patient requested refill of dicflucan- is she still having symptoms? Please see my result note from her nuswab.

## 2018-11-15 NOTE — PROGRESS NOTES
Spoke with patient and advised of lab results. She states that she is still having some itching. Patient verbalized understanding and had no questions at this time. She would like rx for flagyl sent to [facility].  Pended to [facility] for approval.

## 2018-11-16 RX ORDER — METRONIDAZOLE 250 MG/1
500 TABLET ORAL 2 TIMES DAILY
Qty: 28 TAB | Refills: 0 | Status: SHIPPED | OUTPATIENT
Start: 2018-11-16 | End: 2018-11-23

## 2018-11-23 NOTE — PROGRESS NOTES
Hill Champion is a 56 y.o. female who presents with the following complaints:  Chief Complaint   Patient presents with    Vaginal Itching    Skin Problem     open area on buttocks       Subjective:    HPI:   C/o increased redness, dryness, and itching of perineum and vagina x about 2 weeks. Reports symptoms began after intercourse with her boyfriend. He uses a ring at the base of the penis due to erectile dysfunction. She reports the ring is made of latex rubber and she feels it may have irritated the perineal area. She has also noted that an area at the gluteal cleft seems to be bleeding/oozing at times as well. C/o increased redness, dryness, and itching in the areas on hands for the past few weeks where she tends to have eczema symptoms. No new cleansers or detergents.      Pertinent PMH/FH/SH:  Past Medical History:   Diagnosis Date    Anal fistula     Asthma     Back ache     CAD (coronary artery disease)     leaky valves    Cholesterol blood decreased     Dementia     Depression     Deviated septum     Diabetes (HCC)     pre diabetese    Fibromyalgia     Frequent headaches     Heart valve disease     Herniated nucleus pulposus, L5-S1     Migraine     Protein S deficiency (HCC)     Pulmonary emboli (HCC)     Pulmonary hypertension (HCC)     Restless leg syndrome     RLS (restless legs syndrome)     Sickle cell trait (HCC)     Sleep apnea     Stroke ([address]     Upper airway resistance syndrome     Vaginal fistula      Past Surgical History:   Procedure Laterality Date    CARDIAC SURG PROCEDURE UNLIST  9/23/99    closure of unroofed coronary sinus    HX ROTATOR CUFF REPAIR      3/2017     Family History   Problem Relation Age of Onset    Diabetes Mother     Heart Disease Mother     Hypertension Mother     Sickle Cell Anemia Sister     Hypertension Brother     Stroke Maternal Aunt      Social History     Socioeconomic History    Marital status: SINGLE     Spouse name: Not on file    Number of children: Not on file    Years of education: Not on file    Highest education level: Not on file   Tobacco Use    Smoking status: Never Smoker    Smokeless tobacco: Never Used   Substance and Sexual Activity    Alcohol use: No     Alcohol/week: 0.0 oz    Drug use: No    Sexual activity: Not Currently     Advanced Directives: N      Patient Active Problem List    Diagnosis    Fibromyalgia    Bilateral carpal tunnel syndrome    Rectal vaginal fistula    Valvular disease    Depression     She sees a psychiatrist she says      Prediabetes    Seasonal allergic rhinitis    Mild intermittent asthma without complication    Unroofed coronary sinus    [address] embolism ([facility] [address]       Nurse notes were reviewed and are correct  Review of Systems - negative except as listed above in the HPI    Objective:     Vitals:    11/07/18 1027   BP: 132/89   Pulse: (!) 56   Resp: 18   Temp: 98 °F (36.7 °C)   TempSrc: Oral   SpO2: 98%   Weight: 176 lb 6.4 oz (80 kg)   Height: 5' 7\" (1.702 m)     Physical Examination: General appearance - alert, well appearing, and in no distress, oriented to person, place, and time, normal appearing weight and well hydrated  Mental status - normal mood, behavior, speech, dress, motor activity, and thought processes  Neck - supple, no significant adenopathy  Chest - clear to auscultation, no wheezes, rales or rhonchi, symmetric air entry  Heart - normal rate, regular rhythm, normal S1, S2, no murmurs, rubs, clicks or gallops  Abdomen - soft, nontender, nondistended, no masses or organomegaly  Neurological - alert, oriented, normal speech, no focal findings or movement disorder noted  Extremities - no pedal edema noted  Skin - DERMATITIS NOTED: atopic dermatitis on hands bilaterally    Pelvic exam: VULVA: vulvar excoriation, vulvar erythema , VAGINA: vaginal tenderness, vaginal erythema, vaginal discharge - white, RECTAL: rectal exam not indicated.  Small area of excoriation at the gluteal cleft, minimal tenderness. Assessment/ Plan:   Diagnoses and all orders for this visit:    1. Dermatitis  2. Vaginal discharge  Add rx  -     NUSWAB VAGINITIS PLUS  -     fluconazole (DIFLUCAN) 150 mg tablet; Take 1 Tab by mouth daily for 1 day. FDA advises cautious prescribing of oral fluconazole in pregnancy. -     hydrocortisone (HYTONE) 2.5 % topical cream; Apply  to affected area two (2) times a day. use thin layer    3. Eczema, unspecified type  Limit use of soap  Copious thick moisturizer such as aquafor BID and PRN     Follow-up Disposition:  Return if symptoms worsen or fail to improve. I have discussed the diagnosis with the patient and the intended plan as seen in the above orders. The patient has received an after-visit summary and questions were answered concerning future plans. The patient verbalizes understanding. Medication Side Effects and Warnings were discussed with patient: yes  Patient Labs were reviewed and or requested: yes  Patient Past Records were reviewed and or requested: yes    Patient Instructions          Atopic Dermatitis: Care Instructions  Your Care Instructions  Atopic dermatitis (also called eczema) is a skin problem that causes intense itching and a red, raised rash. In severe cases, the rash develops clear fluid-filled blisters. The rash is not contagious. People with this condition seem to have very sensitive immune systems that are likely to react to things that cause allergies. The immune system is the body's way of fighting infection. There is no cure for atopic dermatitis, but you may be able to control it with care at home. Follow-up care is a key part of your treatment and safety. Be sure to make and go to all appointments, and call your doctor if you are having problems. It's also a good idea to know your test results and keep a list of the medicines you take. How can you care for yourself at home?   · Use moisturizer at least twice a day.  · If your doctor prescribes a cream, use it as directed. If your doctor prescribes other medicine, take it exactly as directed. · Wash the affected area with water only. Soap can make dryness and itching worse. Pat dry. · Apply a moisturizer after bathing. Use a cream such as Lubriderm, Moisturel, or Cetaphil that does not irritate the skin or cause a rash. Apply the cream while your skin is still damp after lightly drying with a towel. · Use cold, wet cloths to reduce itching. · Keep cool, and stay out of the sun. · If itching affects your normal activities, an over-the-counter antihistamine, such as diphenhydramine (Benadryl) or loratadine (Claritin) may help. Read and follow all instructions on the label. When should you call for help? Call your doctor now or seek immediate medical care if:    · Your rash gets worse and you have a fever.     · You have new blisters or bruises, or the rash spreads and looks like a sunburn.     · You have signs of infection, such as:  ? Increased pain, swelling, warmth, or redness. ? Red streaks leading from the rash. ? Pus draining from the rash. ? A fever.     · You have crusting or oozing sores.     · You have joint aches or body aches along with your rash.    Watch closely for changes in your health, and be sure to contact your doctor if:    · Your rash does not clear up after 2 to 3 weeks of home treatment.     · Itching interferes with your sleep or daily activities. Where can you learn more? Go to http://shell-td.info/. Enter V465 in the search box to learn more about \"Atopic Dermatitis: Care Instructions. \"  Current as of: April 18, 2018  Content Version: 11.8  © 3540-8239 [employer]. Care instructions adapted under license by [employer] (which disclaims liability or warranty for this information).  If you have questions about a medical condition or this instruction, always ask your healthcare professional. [employer], [facility] disclaims any warranty or liability for your use of this information. Vaginitis: Care Instructions  Your Care Instructions    Vaginitis is soreness or infection of the vagina. This common problem can cause itching and burning. And it can cause a change in vaginal discharge. Sometimes it can cause pain during sex. Vaginitis may be caused by bacteria, yeast, or other germs. Some infections that cause it are caught from a sexual partner. Bath products, spermicides, and douches can irritate the vagina too. Some women have this problem during and after menopause. A drop in estrogen levels during this time can cause dryness, soreness, and pain during sex. Your doctor can give you medicine to treat an infection. And home care may help you feel better. For certain types of infections, your sex partner must be treated too. Follow-up care is a key part of your treatment and safety. Be sure to make and go to all appointments, and call your doctor if you are having problems. It's also a good idea to know your test results and keep a list of the medicines you take. How can you care for yourself at home? · If your doctor prescribed antibiotics, take them as directed. Do not stop taking them just because you feel better. You need to take the full course of antibiotics. · Take your medicines exactly as prescribed. Call your doctor if you think you are having a problem with your medicine. · Do not eat or drink anything that has alcohol if you are taking metronidazole (Flagyl). · If you have a yeast infection, use over-the-counter products as your doctor tells you to. Or take medicine your doctor prescribes exactly as directed. · Wash your vaginal area daily with water. You also can use a mild, unscented soap if you want. · Do not use scented bath products. And do not use vaginal sprays or douches. · Put a washcloth soaked in cool water on the area to relieve itching.  Or you can take cool baths.  · If you have dryness because of menopause, use estrogen cream or pills that your doctor prescribes. · Ask your doctor about when it is okay to have sex. · Use a personal lubricant before sex if you have dryness. Examples are Astroglide, K-Y Jelly, and Wet Lubricant Gel. · Ask your doctor if your sex partner also needs treatment. When should you call for help? Call your doctor now or seek immediate medical care if:    · You have a fever and pelvic pain.    Watch closely for changes in your health, and be sure to contact your doctor if:    · You have bleeding other than your period.     · You do not get better as expected. Where can you learn more? Go to http://shell-td.info/. Enter G039 in the search box to learn more about \"Vaginitis: Care Instructions. \"  Current as of: May 15, 2018  Content Version: 11.8  © 2323-2973 [employer]. Care instructions adapted under license by [employer] (which disclaims liability or warranty for this information). If you have questions about a medical condition or this instruction, always ask your healthcare professional. [place] [address] any warranty or liability for your use of this information.             Otoniel JOSEPH

## 2018-12-07 ENCOUNTER — OFFICE VISIT (OUTPATIENT)
Dept: FAMILY MEDICINE CLINIC | Age: 58
End: 2018-12-07

## 2018-12-07 VITALS
OXYGEN SATURATION: 99 % | HEIGHT: 67 IN | WEIGHT: 181 LBS | HEART RATE: 62 BPM | RESPIRATION RATE: 18 BRPM | BODY MASS INDEX: 28.41 KG/M2 | TEMPERATURE: 97.8 F | DIASTOLIC BLOOD PRESSURE: 73 MMHG | SYSTOLIC BLOOD PRESSURE: 123 MMHG

## 2018-12-07 DIAGNOSIS — R06.02 SOB (SHORTNESS OF BREATH): Primary | ICD-10-CM

## 2018-12-07 NOTE — PROGRESS NOTES
1. Have you been to the ER, urgent care clinic since your last visit? Hospitalized since your last visit? No    2. Have you seen or consulted any other health care providers outside of the [facility] since your last visit? Include any pap smears or colon screening.  No     Chief Complaint   Patient presents with    Shortness of Breath    Cough

## 2018-12-07 NOTE — PROGRESS NOTES
Chief Complaint   Patient presents with    Shortness of Breath    Cough     she is a 66y.o. year old female who presents for evalution. She started feeling sob last week. 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 That PE happened many years ago. Reviewed PmHx, RxHx, FmHx, SocHx, AllgHx and updated and dated in the chart. Aspirin yes ____   No____ N/A____    Patient Active Problem List    Diagnosis    Fibromyalgia    Bilateral carpal tunnel syndrome    Rectal vaginal fistula    Valvular disease    Depression     She sees a psychiatrist she says      Prediabetes    Seasonal allergic rhinitis    Mild intermittent asthma without complication    Unroofed coronary sinus    Pulmonary embolism ([address]       Nurse notes were reviewed and copied and are correct  Review of Systems - negative except as listed above in the HPI    Objective:     Vitals:    12/07/18 0803   BP: 123/73   Pulse: 62   Resp: 18   Temp: 97.8 °F (36.6 °C)   TempSrc: Oral   SpO2: 99%   Weight: 181 lb (82.1 kg)   Height: 5' 7\" (1.702 m)       Physical Examination: General appearance - alert, well appearing, and in no distress  Mental status - alert, oriented to person, place, and time  Lymphatics - no palpable lymphadenopathy, no hepatosplenomegaly  Chest - clear to auscultation, no wheezes, rales or rhonchi, symmetric air entry  Heart - normal rate, regular rhythm, normal S1, S2, no murmurs, rubs, clicks or gallops      Assessment/ Plan:   Diagnoses and all orders for this visit:    1.  SOB (shortness of breath)     her lungs are clear and the O2 sat is normal  This is not likely a PE . She is not whjeezing either  This might be her anxiety  She has an appt in a few days with cardiology also    Follow-up Disposition:  Return if symptoms worsen or fail to improve. ICD-10-CM ICD-9-CM    1. SOB (shortness of breath) R06.02 786.05        I have discussed the diagnosis with the patient and the intended plan as seen in the above orders. The patient has received an after-visit summary and questions were answered concerning future plans. Medication Side Effects and Warnings were discussed with patient: yes  Patient Labs were reviewed and or requested: yes  Patient Past Records were reviewed and or requested: yes        There are no Patient Instructions on file for this visit.     The patient verbalizes understanding and agrees with the plan of care        Patient has the advanced directives booklet to review

## 2018-12-19 RX ORDER — TOPIRAMATE 100 MG/1
TABLET, FILM COATED ORAL
Qty: 180 TAB | Refills: 2 | Status: SHIPPED | OUTPATIENT
Start: 2018-12-19 | End: 2019-05-30 | Stop reason: SDUPTHER

## 2019-01-09 ENCOUNTER — DOCUMENTATION ONLY (OUTPATIENT)
Dept: FAMILY MEDICINE CLINIC | Age: 59
End: 2019-01-09

## 2019-01-14 ENCOUNTER — OFFICE VISIT (OUTPATIENT)
Dept: FAMILY MEDICINE CLINIC | Age: 59
End: 2019-01-14

## 2019-01-14 VITALS
OXYGEN SATURATION: 99 % | RESPIRATION RATE: 17 BRPM | HEART RATE: 67 BPM | WEIGHT: 173 LBS | HEIGHT: 67 IN | TEMPERATURE: 98.2 F | BODY MASS INDEX: 27.15 KG/M2 | DIASTOLIC BLOOD PRESSURE: 74 MMHG | SYSTOLIC BLOOD PRESSURE: 130 MMHG

## 2019-01-14 DIAGNOSIS — Z71.89 ADVANCED DIRECTIVES, COUNSELING/DISCUSSION: ICD-10-CM

## 2019-01-14 DIAGNOSIS — F32.A DEPRESSION, UNSPECIFIED DEPRESSION TYPE: ICD-10-CM

## 2019-01-14 DIAGNOSIS — Z00.00 MEDICARE ANNUAL WELLNESS VISIT, SUBSEQUENT: Primary | ICD-10-CM

## 2019-01-14 DIAGNOSIS — Z13.31 SCREENING FOR DEPRESSION: ICD-10-CM

## 2019-01-14 DIAGNOSIS — G89.29 CHRONIC LEFT-SIDED LOW BACK PAIN WITH LEFT-SIDED SCIATICA: ICD-10-CM

## 2019-01-14 DIAGNOSIS — I27.20 PULMONARY HTN (HCC): ICD-10-CM

## 2019-01-14 DIAGNOSIS — M54.42 CHRONIC LEFT-SIDED LOW BACK PAIN WITH LEFT-SIDED SCIATICA: ICD-10-CM

## 2019-01-14 DIAGNOSIS — Z13.39 SCREENING FOR ALCOHOLISM: ICD-10-CM

## 2019-01-14 DIAGNOSIS — M79.7 FIBROMYALGIA: ICD-10-CM

## 2019-01-14 RX ORDER — TRAZODONE HYDROCHLORIDE 100 MG/1
1 TABLET ORAL
Refills: 0 | COMMUNITY
Start: 2018-12-13 | End: 2019-03-15 | Stop reason: ALTCHOICE

## 2019-01-14 RX ORDER — METHOCARBAMOL 500 MG/1
500 TABLET, FILM COATED ORAL
Qty: 90 TAB | Refills: 1 | Status: SHIPPED | OUTPATIENT
Start: 2019-01-14 | End: 2019-05-30 | Stop reason: SDUPTHER

## 2019-01-14 NOTE — ACP (ADVANCE CARE PLANNING)
Advance Care Planning    Advance Care Planning (ACP) Provider Conversation Snapshot    Date of ACP Conversation: 01/14/19  Persons included in Conversation:  patient  Length of ACP Conversation in minutes:  <16 minutes (Non-Billable)    Authorized Decision Maker (if patient is incapable of making informed decisions): This person is:   Healthcare Agent/Medical Power of  under Advance Directive          For Patients with Decision Making Capacity:   Values/Goals: Exploration of values, goals, and preferences if recovery is not expected, even with continued medical treatment in the event of:  Imminent death  Severe, permanent brain injury  \"In these circumstances, what matters most to you? \"  Care focused more on comfort or quality of life. Conversation Outcomes / Follow-Up Plan:   Reviewed existing Advance Directive   Recommended communicating the plan and making copies for the healthcare agent, personal physician, and others as appropriate (e.g., health system)  Recommended review of completed ACP document annually or upon change in health status     The advanced directive on file reflects her current wishes.   Review again in 12 months or PRN    James Cazares NP   01/14/19

## 2019-01-14 NOTE — PATIENT INSTRUCTIONS
Learning About Relief for Back Pain What is back tension and strain? Back strain happens when you overstretch, or pull, a muscle in your back. You may hurt your back in an accident or when you exercise or lift something. Most back pain will get better with rest and time. You can take care of yourself at home to help your back heal. 
What can you do first to relieve back pain? When you first feel back pain, try these steps: 
· Walk. Take a short walk (10 to 20 minutes) on a level surface (no slopes, hills, or stairs) every 2 to 3 hours. Walk only distances you can manage without pain, especially leg pain. · Relax. Find a comfortable position for rest. Some people are comfortable on the floor or a medium-firm bed with a small pillow under their head and another under their knees. Some people prefer to lie on their side with a pillow between their knees. Don't stay in one position for too long. · Try heat or ice. Try using a heating pad on a low or medium setting, or take a warm shower, for 15 to 20 minutes every 2 to 3 hours. Or you can buy single-use heat wraps that last up to 8 hours. You can also try an ice pack for 10 to 15 minutes every 2 to 3 hours. You can use an ice pack or a bag of frozen vegetables wrapped in a thin towel. There is not strong evidence that either heat or ice will help, but you can try them to see if they help. You may also want to try switching between heat and cold. · Take pain medicine exactly as directed. ? If the doctor gave you a prescription medicine for pain, take it as prescribed. ? If you are not taking a prescription pain medicine, ask your doctor if you can take an over-the-counter medicine. What else can you do? · Stretch and exercise. Exercises that increase flexibility may relieve your pain and make it easier for your muscles to keep your spine in a good, neutral position. And don't forget to keep walking. · Do self-massage. You can use self-massage to unwind after work or school or to energize yourself in the morning. You can easily massage your feet, hands, or neck. Self-massage works best if you are in comfortable clothes and are sitting or lying in a comfortable position. Use oil or lotion to massage bare skin. · Reduce stress. Back pain can lead to a vicious [place]: Distress about the pain tenses the muscles in your back, which in turn causes more pain. Learn how to relax your mind and your muscles to lower your stress. Where can you learn more? Go to http://shellCanpagestd.info/. Enter N554 in the search box to learn more about \"Learning About Relief for Back Pain. \" Current as of: November 29, 2017 Content Version: 11.8 © 9090-9239 [employer]. Care instructions adapted under license by [facility] (which disclaims liability or warranty for this information). If you have questions about a medical condition or this instruction, always ask your healthcare professional. [place] [address] any warranty or liability for your use of this information. Fibromyalgia: Care Instructions Your Care Instructions Fibromyalgia is a painful condition that is not completely understood by medical experts. The cause of fibromyalgia is not known. It can make you feel tired and ache all over. It causes tender spots at specific points of the body that hurt only when you press on them. You may have trouble sleeping, as well as other symptoms. These problems can upset your work and home life. Symptoms tend to come and go, although they may never go away completely. Fibromyalgia does not harm your muscles, joints, or organs. Follow-up care is a key part of your treatment and safety. Be sure to make and go to all appointments, and call your doctor if you are having problems.  It's also a good idea to know your test results and keep a list of the medicines you take. How can you care for yourself at home? · Exercise often. Walk, swim, or bike to help with pain and sleep problems and to make you feel better. · Try to get a good night's sleep. Go to bed and get up at the same time each day, whether you feel rested or not. Make sure you have a good mattress and pillow. · Reduce stress. Avoid things that cause you stress, if you can. If not, work at making them less stressful. Learn to use biofeedback, guided imagery, meditation, or other methods to relax. · Make healthy changes. Eat a balanced diet, quit smoking, and limit alcohol and caffeine. · Use a heating pad set on low or take warm baths or showers for pain. Using cold packs for up to 20 minutes at a time can also relieve pain. Put a thin cloth between the cold pack and your skin. A gentle massage might help too. · Be safe with medicines. Take your medicines exactly as prescribed. Call your doctor if you think you are having a problem with your medicine. Your doctor may talk to you about taking antidepressant medicines. These medicines may improve sleep, relieve pain, and in some cases treat depression. · Learn about fibromyalgia. This makes coping easier. Then, take an active role in your treatment. · Think about joining a support group with others who have fibromyalgia to learn more and get support. When should you call for help? Watch closely for changes in your health, and be sure to contact your doctor if: 
  · You feel sad, helpless, or hopeless; lose interest in things you used to enjoy; or have other symptoms of depression.  
  · Your fibromyalgia symptoms get worse. Where can you learn more? Go to http://shell-td.info/. Enter V003 in the search box to learn more about \"Fibromyalgia: Care Instructions. \" Current as of: June 4, 2018 Content Version: 11.8 © 3253-8511 [employer].  Care instructions adapted under license by [address] (which disclaims liability or warranty for this information). If you have questions about a medical condition or this instruction, always ask your healthcare professional. [address] any warranty or liability for your use of this information. Medicare Wellness Visit, Female The best way to live healthy is to have a lifestyle where you eat a well-balanced diet, exercise regularly, limit alcohol use, and quit all forms of tobacco/nicotine, if applicable. Regular preventive services are another way to keep healthy. Preventive services (vaccines, screening tests, monitoring & exams) can help personalize your care plan, which helps you manage your own care. Screening tests can find health problems at the earliest stages, when they are easiest to treat. [facility] follows the current, evidence-based guidelines published by the [facility] ([facility]) when recommending preventive services for our patients. Because we follow these guidelines, sometimes recommendations change over time as research supports it. (For example, mammograms used to be recommended annually. Even though Medicare will still pay for an annual mammogram, the newer guidelines recommend a mammogram every two years for women of average risk.) Of course, you and your doctor may decide to screen more often for some diseases, based on your risk and your health status. Preventive services for you include: - Medicare offers their members a free annual wellness visit, which is time for you and your primary care provider to discuss and plan for your preventive service needs. Take advantage of this benefit every year! 
-All adults over the age of 72 should receive the recommended pneumonia vaccines. Current USPSTF guidelines recommend a series of two vaccines for the best pneumonia protection. -All adults should have a flu vaccine yearly and a tetanus vaccine every 10 years. All adults age 61 and older should receive a shingles vaccine once in their lifetime.   
-A bone mass density test is recommended when a woman turns 65 to screen for osteoporosis. This test is only recommended one time, as a screening. Some providers will use this same test as a disease monitoring tool if you already have osteoporosis. -All adults age 38-68 who are overweight should have a diabetes screening test once every three years.  
-Other screening tests and preventive services for persons with diabetes include: an eye exam to screen for diabetic retinopathy, a kidney function test, a foot exam, and stricter control over your cholesterol.  
-Cardiovascular screening for adults with routine risk involves an electrocardiogram (ECG) at intervals determined by your doctor.  
-Colorectal cancer screenings should be done for adults age 54-65 with no increased risk factors for colorectal cancer. There are a number of acceptable methods of screening for this type of cancer. Each test has its own benefits and drawbacks. Discuss with your doctor what is most appropriate for you during your annual wellness visit. The different tests include: colonoscopy (considered the best screening method), a fecal occult blood test, a fecal DNA test, and sigmoidoscopy. -Breast cancer screenings are recommended every other year for women of normal risk, age 54-69. 
-Cervical cancer screenings for women over age 72 are only recommended with certain risk factors.  
-All adults born between [facility] should be screened once for Hepatitis C. Here is a list of your current Health Maintenance items (your personalized list of preventive services) with a due date: 
Health Maintenance Due Topic Date Due  Shingles Vaccine (1 of 2) 11/25/2012 [address] [place] [address] Annual Well Visit  12/12/2018

## 2019-01-14 NOTE — PROGRESS NOTES
Stat ECG was performed and given to Kristi MOSER.         This is the Subsequent Medicare Annual Wellness Exam, performed 12 months or more after the Initial AWV or the last Subsequent AWV I have reviewed the patient's medical history in detail and updated the computerized patient record. History She recently started exercising with silver sneakers at [address] [place] [address]. Past Medical History:  
Diagnosis Date  Anal fistula  Asthma  Back ache  CAD (coronary artery disease) leaky valves  Cholesterol blood decreased  Dementia  Depression  Deviated septum  Diabetes ([address]   
 pre diabetese  Fibromyalgia  Frequent headaches  Heart valve disease  Herniated nucleus pulposus, L5-S1  Migraine  Protein S deficiency ([address]  Pulmonary emboli ([address]  Pulmonary hypertension ([address]  Restless leg syndrome  RLS (restless legs syndrome)  Sickle cell trait ([address]  Sleep apnea  Stroke ([address]  Upper airway resistance syndrome  Vaginal fistula Past Surgical History:  
Procedure Laterality Date  CARDIAC SURG PROCEDURE UNLIST  9/23/99  
 closure of unroofed coronary sinus  HX ROTATOR CUFF REPAIR    
 3/2017 Current Outpatient Medications Medication Sig Dispense Refill  traZODone (DESYREL) 100 mg tablet Take 1 Tab by mouth nightly. 0  
 methocarbamol (ROBAXIN) 500 mg tablet Take 1 Tab by mouth nightly as needed. 90 Tab 1  
 topiramate (TOPAMAX) 100 mg tablet TAKE 1 TABLET TWICE DAILY 180 Tab 2  
 hydrocortisone (HYTONE) 2.5 % topical cream Apply  to affected area two (2) times a day. use thin layer 30 g 0  
 DULoxetine 40 mg cpDR Take 40 mg by mouth daily. 90 Cap 1  
 donepezil (ARICEPT) 10 mg tablet Take 1 Tab by mouth nightly. 90 Tab 1  
 PARoxetine (PAXIL) 20 mg tablet Take 1 Tab by mouth daily. 90 Tab 1  
 fluticasone (FLONASE) 50 mcg/actuation nasal spray 2 Sprays by Both Nostrils route daily.  3 Bottle 1  
  raNITIdine (ZANTAC) 150 mg tablet Take 1 Tab by mouth two (2) times a day. 180 Tab 1  
 fluticasone-salmeterol (ADVAIR) 250-50 mcg/dose diskus inhaler Take 1 Puff by inhalation every twelve (12) hours. 3 Inhaler 1  
 simvastatin (ZOCOR) 20 mg tablet Take 1 Tab by mouth nightly. 90 Tab 1  
 memantine ER (NAMENDA XR) 28 mg capsule Take 1 Cap by mouth daily. 90 Cap 1  
 montelukast (SINGULAIR) 10 mg tablet Take 1 Tab by mouth daily. 90 Tab 1  
 gabapentin (NEURONTIN) 400 mg capsule Take 1 Cap by mouth three (3) times daily. 270 Cap 1  divalproex DR (DEPAKOTE) 500 mg tablet Take 1 Tab by mouth two (2) times a day. 180 Tab 1  
 albuterol (PROVENTIL HFA, VENTOLIN HFA, PROAIR HFA) 90 mcg/actuation inhaler Take 2 Puffs by inhalation every four (4) hours as needed for Wheezing. 3 Inhaler 1  
 aspirin delayed-release 81 mg tablet Take 1 Tab by mouth daily. 90 Tab 1  
 aspirin-acetaminophen-caffeine (EXCEDRIN ES) 250-250-65 mg per tablet Take 1 Tab by mouth.  acetaminophen (TYLENOL) 325 mg tablet Take  by mouth every four (4) hours as needed for Pain.  albuterol (PROVENTIL VENTOLIN) 2.5 mg /3 mL (0.083 %) nebulizer solution 3 mL by Nebulization route every four (4) hours as needed for Wheezing. 25 Each 2  
 Cetirizine 10 mg cap Take  by mouth.  albuterol (PROVENTIL VENTOLIN) 2.5 mg /3 mL (0.083 %) nebulizer solution by Nebulization route once. Allergies Allergen Reactions  Ambien [Zolpidem] Unknown (comments) Causes Migraine  Cephalexin Rash  Morphine Rash  Motrin [Ibuprofen] Swelling  Nortriptyline Unknown (comments) Bleeding from mouth  Pepcid [Famotidine] Other (comments) Migraines.  Protonix [Pantoprazole] Rash  Vicodin [Hydrocodone-Acetaminophen] Unknown (comments) Causes Migraines  Vitamins For Infusion Unknown (comments) Pt stated all vitamins cause her lightheadness and sick Family History Problem Relation Age of Onset  Diabetes Mother  Heart Disease Mother  Hypertension Mother  Sickle Cell Anemia Sister  Hypertension Brother  Stroke Maternal Aunt Social History Tobacco Use  Smoking status: Never Smoker  Smokeless tobacco: Never Used Substance Use Topics  Alcohol use: No  
  Alcohol/week: 0.0 oz Patient Active Problem List  
Diagnosis Code  Pulmonary embolism (HCC) I26.99  
 Prediabetes R73.03  
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  Depression F32.9  Rectal vaginal fistula N82.3  Valvular disease I38  Fibromyalgia M79.7  Bilateral carpal tunnel syndrome G56.03 Depression Risk Factor Screening: PHQ over the last two weeks 1/14/2019 PHQ Not Done Active Diagnosis of Depression or Bipolar Disorder Little interest or pleasure in doing things - Feeling down, depressed, irritable, or hopeless - Total Score PHQ 2 - Trouble falling or staying asleep, or sleeping too much - Feeling tired or having little energy - Poor appetite, weight loss, or overeating - Feeling bad about yourself - or that you are a failure or have let yourself or your family down - Trouble concentrating on things such as school, work, reading, or watching TV - Moving or speaking so slowly that other people could have noticed; or the opposite being so fidgety that others notice - Thoughts of being better off dead, or hurting yourself in some way -  
PHQ 9 Score - How difficult have these problems made it for you to do your work, take care of your home and get along with others Somewhat difficult Alcohol Risk Factor Screening: You do not drink alcohol or very rarely. Functional Ability and Level of Safety:  
Hearing Loss Hearing is good. Activities of Daily Living The home contains: handrails Patient does total self care Fall Risk Fall Risk Assessment, last 12 mths 1/14/2019 Able to walk? Yes Fall in past 12 months? No  
 
 
Abuse Screen Patient is not abused Cognitive Screening Evaluation of Cognitive Function: 
Has your family/caregiver stated any concerns about your memory: no 
Normal 
 
Patient Care Team  
Patient Care Team: 
Sissy Samuel MD as PCP - [facility]) Jeannette Galdamez MD as Physician (Sleep Medicine) Assessment/Plan Education and counseling provided: 
Are appropriate based on today's review and evaluation Weight loss is recommended Diagnoses and all orders for this visit: 
 
1. Medicare annual wellness visit, subsequent 2. Screening for alcoholism -     [place] ANNUAL ALCOHOL SCREEN 15 MIN 3. Screening for depression 
-     [facility]. Advanced directives, counseling/discussion See acp note Health Maintenance Due Topic Date Due  Shingrix Vaccine Age 50> (1 of 2) 11/25/2012  MEDICARE YEARLY EXAM  12/12/2018 Follow up: next AWV due in 12 months. Evie Morrissey NP 
01/14/19

## 2019-01-14 NOTE — PROGRESS NOTES
Bebeto Adkins is a 66 y.o. female who presents with the following complaints: Chief Complaint Patient presents with  LOW BACK PAIN Subjective: HPI:  
Presents for f/u of fibromyalgia, low back pain, shortness of breath, and for evaluation of depression. Requests refill of robaxin. She takes occasionally. She is having pain in the left low back, notices more when standing after sitting for some times. It radiates into the left thigh at times. She recently started exercising with silver sneakers. Her daughter is working nights now; this has freed her up to be more consistent with going to to the [employer] for exercise. She is able to complete ADLs and take care of her home. She was tearful during depression screening in [facility], feels symptoms are not well controlled. She is taking duloxetine, paxil, trazadone with good compliance, no apparent adverse effects. She reports she had not been under the care of mental health professional since moving to [place]. No thoughts of harming self or others. She had shortness of breath at last visit in December with Dr. rDake Berger. She reports she saw cardiologist, had negative CT chest 2 weeks ago. She reports echo last week, states provider advised that pulmonary htn had gotten a bit worse, medication was sent to her pharmacy and she will start it today. F/u is planned in a few months. Pertinent PMH/FH/SH: 
Past Medical History:  
Diagnosis Date  Anal fistula  Asthma  Back ache  CAD (coronary artery disease) leaky valves  Cholesterol blood decreased  Dementia  Depression  Deviated septum  Diabetes ([address]   
 pre diabetese  Fibromyalgia  Frequent headaches  Heart valve disease  Herniated nucleus pulposus, L5-S1  Migraine  Protein S deficiency ([address]  Pulmonary emboli ([address]  Pulmonary hypertension ([address]  Restless leg syndrome  RLS (restless legs syndrome)  Sick[facility]  Sleep apnea  Stroke ([facility]  Upper airway resistance syndrome  Vaginal fistula Past Surgical History:  
Procedure Laterality Date  CARDIAC SURG PROCEDURE UNLIST  9/23/99  
 closure of unroofed coronary sinus  HX ROTATOR CUFF REPAIR    
 3/2017 Family History Problem Relation Age of Onset  Diabetes Mother  Heart Disease Mother  Hypertension Mother  Sickle Cell Anemia Sister  Hypertension Brother  Stroke Maternal Aunt Social History Socioeconomic History  Marital status: SINGLE Spouse name: Not on file  Number of children: Not on file  Years of education: Not on file  Highest education level: Not on file Tobacco Use  Smoking status: Never Smoker  Smokeless tobacco: Never Used Substance and Sexual Activity  Alcohol use: No  
  Alcohol/week: 0.0 oz  Drug use: No  
 Sexual activity: Not Currently Advanced Directives: N 
 
 
Patient Active Problem List  
 Diagnosis  Fibromyalgia  Bilateral carpal tunnel syndrome  Rectal vaginal fistula  Valvular disease  Depression She sees a psychiatrist she says  Prediabetes  Seasonal allergic rhinitis  Mild intermittent asthma without complication  Unroofed coronary sinus  Pulmonary embolism ([facility] Nurse notes were reviewed and are correct Review of Systems - negative except as listed above in the HPI Objective:  
 
Vitals:  
 01/14/19 1109 BP: 130/74 Pulse: 67 Resp: 17 Temp: 98.2 °F (36.8 °C) TempSrc: Oral  
SpO2: 99% Weight: 173 lb (78.5 kg) Height: 5' 7\" (1.702 m) Physical Examination: General appearance - alert, well appearing, and in no distress, oriented to person, place, and time and well hydrated Mental status - angela behavior, speech, dress, motor activity, and thought processes, depressed mood, tearful at times Neck - supple, no significant adenopathy Chest - clear to auscultation, no wheezes, rales or rhonchi, symmetric air entry Heart - normal rate, regular rhythm, normal S1, S2, no murmurs, rubs, clicks or gallops, no JVD Abdomen - soft, nontender, nondistended, no masses or organomegaly 
bowel sounds normal 
Back exam - full range of motion, no tenderness, palpable spasm or pain on motion, sacroiliac joints and sciatic notches nontender, normal reflexes and strength bilateral lower extremities Neurological - alert, oriented, normal speech, no focal findings or movement disorder noted Extremities - no pedal edema noted Skin - normal coloration and turgor, no rashes Assessment/ Plan:  
Diagnoses and all orders for this visit: 
 
1. Chronic left-sided low back pain with left-sided sciatica 
stable Exercise Heat Stretching Muscle relaxer prn at bedtimes 
-     methocarbamol (ROBAXIN) 500 mg tablet; Take 1 Tab by mouth nightly as needed. 2. Fibromyalgia 
stable Continue duloxetine Recommend regular exercise- try water exercise at her rec center 3. Pulmonary HTN ([facility] Worsening- obtain records for review Management per cardiology 5. Depression, unspecified depression type 
worsening Continue current meds, refer for additional treatment Utilize community and family support systems To ED for any suicidal ideation 
-     REFERRAL TO PSYCHIATRY Follow-up Disposition: 
Return in about 3 months (around 4/14/2019) for f/u fibromyalgia. I have discussed the diagnosis with the patient and the intended plan as seen in the above orders. The patient has received an after-visit summary and questions were answered concerning future plans. The patient verbalizes understanding. Medication Side Effects and Warnings were discussed with patient: yes Patient Labs were reviewed and or requested: yes Patient Past Records were reviewed and or requested: yes Patient Instructions Learning About Relief for Back Pain What is back tension and strain? Back strain happens when you overstretch, or pull, a muscle in your back. You may hurt your back in an accident or when you exercise or lift something. Most back pain will get better with rest and time. You can take care of yourself at home to help your back heal. 
What can you do first to relieve back pain? When you first feel back pain, try these steps: 
· Walk. Take a short walk (10 to 20 minutes) on a level surface (no slopes, hills, or stairs) every 2 to 3 hours. Walk only distances you can manage without pain, especially leg pain. · Relax. Find a comfortable position for rest. Some people are comfortable on the floor or a medium-firm bed with a small pillow under their head and another under their knees. Some people prefer to lie on their side with a pillow between their knees. Don't stay in one position for too long. · Try heat or ice. Try using a heating pad on a low or medium setting, or take a warm shower, for 15 to 20 minutes every 2 to 3 hours. Or you can buy single-use heat wraps that last up to 8 hours. You can also try an ice pack for 10 to 15 minutes every 2 to 3 hours. You can use an ice pack or a bag of frozen vegetables wrapped in a thin towel. There is not strong evidence that either heat or ice will help, but you can try them to see if they help. You may also want to try switching between heat and cold. · Take pain medicine exactly as directed. ? If the doctor gave you a prescription medicine for pain, take it as prescribed. ? If you are not taking a prescription pain medicine, ask your doctor if you can take an over-the-counter medicine. What else can you do? · Stretch and exercise. Exercises that increase flexibility may relieve your pain and make it easier for your muscles to keep your spine in a good, neutral position. And don't forget to keep walking. · Do self-massage.  You can use self-massage to unwind after work or school or to energize yourself in the morning. You can easily massage your feet, hands, or neck. Self-massage works best if you are in comfortable clothes and are sitting or lying in a comfortable position. Use oil or lotion to massage bare skin. · Reduce stress. Back pain can lead to a vicious [place]: Distress about the pain tenses the muscles in your back, which in turn causes more pain. Learn how to relax your mind and your muscles to lower your stress. Where can you learn more? Go to http://shell-td.info/. Enter N446 in the search box to learn more about \"Learning About Relief for Back Pain. \" Current as of: November 29, 2017 Content Version: 11.8 © 9969-3230 [employer]. Care instructions adapted under license by [employer] (which disclaims liability or warranty for this information). If you have questions about a medical condition or this instruction, always ask your healthcare professional. [place] [address] any warranty or liability for your use of this information. Fibromyalgia: Care Instructions Your Care Instructions Fibromyalgia is a painful condition that is not completely understood by medical experts. The cause of fibromyalgia is not known. It can make you feel tired and ache all over. It causes tender spots at specific points of the body that hurt only when you press on them. You may have trouble sleeping, as well as other symptoms. These problems can upset your work and home life. Symptoms tend to come and go, although they may never go away completely. Fibromyalgia does not harm your muscles, joints, or organs. Follow-up care is a key part of your treatment and safety. Be sure to make and go to all appointments, and call your doctor if you are having problems. It's also a good idea to know your test results and keep a list of the medicines you take. How can you care for yourself at home? · Exercise often. Walk, swim, or bike to help with pain and sleep problems and to make you feel better. · Try to get a good night's sleep. Go to bed and get up at the same time each day, whether you feel rested or not. Make sure you have a good mattress and pillow. · Reduce stress. Avoid things that cause you stress, if you can. If not, work at making them less stressful. Learn to use biofeedback, guided imagery, meditation, or other methods to relax. · Make healthy changes. Eat a balanced diet, quit smoking, and limit alcohol and caffeine. · Use a heating pad set on low or take warm baths or showers for pain. Using cold packs for up to 20 minutes at a time can also relieve pain. Put a thin cloth between the cold pack and your skin. A gentle massage might help too. · Be safe with medicines. Take your medicines exactly as prescribed. Call your doctor if you think you are having a problem with your medicine. Your doctor may talk to you about taking antidepressant medicines. These medicines may improve sleep, relieve pain, and in some cases treat depression. · Learn about fibromyalgia. This makes coping easier. Then, take an active role in your treatment. · Think about joining a support group with others who have fibromyalgia to learn more and get support. When should you call for help? Watch closely for changes in your health, and be sure to contact your doctor if: 
  · You feel sad, helpless, or hopeless; lose interest in things you used to enjoy; or have other symptoms of depression.  
  · Your fibromyalgia symptoms get worse. Where can you learn more? Go to http://shell-td.info/. Enter V003 in the search box to learn more about \"Fibromyalgia: Care Instructions. \" Current as of: June 4, 2018 Content Version: 11.8 © 8878-3707 [employer].  Care instructions adapted under license by [employer] (which disclaims liability or warranty for this information). If you have questions about a medical condition or this instruction, always ask your healthcare professional. [address] any warranty or liability for your use of this information. Medicare Wellness Visit, Female The best way to live healthy is to have a lifestyle where you eat a well-balanced diet, exercise regularly, limit alcohol use, and quit all forms of tobacco/nicotine, if applicable. Regular preventive services are another way to keep healthy. Preventive services (vaccines, screening tests, monitoring & exams) can help personalize your care plan, which helps you manage your own care. Screening tests can find health problems at the earliest stages, when they are easiest to treat. [facility] follows the current, evidence-based guidelines published by the [facility] Dontae Karmen ([facility]) when recommending preventive services for our patients. Because we follow these guidelines, sometimes recommendations change over time as research supports it. (For example, mammograms used to be recommended annually. Even though Medicare will still pay for an annual mammogram, the newer guidelines recommend a mammogram every two years for women of average risk.) Of course, you and your doctor may decide to screen more often for some diseases, based on your risk and your health status. Preventive services for you include: - Medicare offers their members a free annual wellness visit, which is time for you and your primary care provider to discuss and plan for your preventive service needs. Take advantage of this benefit every year! 
-All adults over the age of 72 should receive the recommended pneumonia vaccines.  Current USPSTF guidelines recommend a series of two vaccines for the best pneumonia protection.  
-All adults should have a flu vaccine yearly and a tetanus vaccine every 10 years. All adults age 61 and older should receive a shingles vaccine once in their lifetime.   
-A bone mass density test is recommended when a woman turns 65 to screen for osteoporosis. This test is only recommended one time, as a screening. Some providers will use this same test as a disease monitoring tool if you already have osteoporosis. -All adults age 38-68 who are overweight should have a diabetes screening test once every three years.  
-Other screening tests and preventive services for persons with diabetes include: an eye exam to screen for diabetic retinopathy, a kidney function test, a foot exam, and stricter control over your cholesterol.  
-Cardiovascular screening for adults with routine risk involves an electrocardiogram (ECG) at intervals determined by your doctor.  
-Colorectal cancer screenings should be done for adults age 54-65 with no increased risk factors for colorectal cancer. There are a number of acceptable methods of screening for this type of cancer. Each test has its own benefits and drawbacks. Discuss with your doctor what is most appropriate for you during your annual wellness visit. The different tests include: colonoscopy (considered the best screening method), a fecal occult blood test, a fecal DNA test, and sigmoidoscopy. -Breast cancer screenings are recommended every other year for women of normal risk, age 54-69. 
-Cervical cancer screenings for women over age 72 are only recommended with certain risk factors.  
-All adults born between [facility] should be screened once for Hepatitis C. Here is a list of your current Health Maintenance items (your personalized list of preventive services) with a due date: 
Health Maintenance Due Topic Date Due  Shingles Vacc[facility]1 of 2) [facility]  12/12/2018 Fernando JOSEPH

## 2019-01-14 NOTE — PROGRESS NOTES
1. Have you been to the ER, urgent care clinic since your last visit? Hospitalized since your last visit? No 
 
2. Have you seen or consulted any other health care providers outside of the [address] [place] [address] since your last visit? Include any pap smears or colon screening. Yes, Patient has seen cardiologist and had stress test and ct scan. Chief Complaint Patient presents with  LOW BACK PAIN

## 2019-01-24 ENCOUNTER — TELEPHONE (OUTPATIENT)
Dept: FAMILY MEDICINE CLINIC | Age: 59
End: 2019-01-24

## 2019-01-24 DIAGNOSIS — M54.42 CHRONIC LEFT-SIDED LOW BACK PAIN WITH LEFT-SIDED SCIATICA: ICD-10-CM

## 2019-01-24 DIAGNOSIS — G89.29 CHRONIC LEFT-SIDED LOW BACK PAIN WITH LEFT-SIDED SCIATICA: ICD-10-CM

## 2019-01-24 NOTE — TELEPHONE ENCOUNTER
New medication request faxed from [facility] for an alternative to methocarbamol. Tizanidine and Baclofen are on their formulary.

## 2019-01-25 RX ORDER — TIZANIDINE 2 MG/1
2 TABLET ORAL 3 TIMES DAILY
Qty: 270 TAB | Refills: 1 | Status: SHIPPED | OUTPATIENT
Start: 2019-01-25 | End: 2019-05-30 | Stop reason: SDUPTHER

## 2019-03-05 RX ORDER — TOPIRAMATE 100 MG/1
TABLET, FILM COATED ORAL
Qty: 180 TAB | Refills: 2 | Status: CANCELLED | OUTPATIENT
Start: 2019-03-05

## 2019-03-05 NOTE — TELEPHONE ENCOUNTER
Requested Prescriptions     Pending Prescriptions Disp Refills    topiramate (TOPAMAX) 100 mg tablet 180 Tab 2     Pt said she also needs refill on Imitrex.

## 2019-03-06 ENCOUNTER — TELEPHONE (OUTPATIENT)
Dept: FAMILY MEDICINE CLINIC | Age: 59
End: 2019-03-06

## 2019-03-06 RX ORDER — SUMATRIPTAN 50 MG/1
50 TABLET, FILM COATED ORAL
Qty: 9 TAB | Refills: 1 | Status: SHIPPED | OUTPATIENT
Start: 2019-03-06 | End: 2019-03-06

## 2019-03-06 NOTE — TELEPHONE ENCOUNTER
Called patient to inform of 11 or 11:15 opening today. She refused the appointment because she doesn't have a car today. She was instructed to purchase OTC monistat 7 to take. She's to schedule an appointment if symptoms don't improve after completing full course of treatment.

## 2019-03-06 NOTE — TELEPHONE ENCOUNTER
PT was trying to get an appointment. But no availabilty. She wants to know if she can get something for a yeast infection.  Please call and advise

## 2019-03-15 RX ORDER — DULOXETINE 40 MG/1
CAPSULE, DELAYED RELEASE ORAL
Qty: 90 CAP | Refills: 1 | Status: SHIPPED | OUTPATIENT
Start: 2019-03-15 | End: 2019-05-30 | Stop reason: SDUPTHER

## 2019-03-15 RX ORDER — PAROXETINE HYDROCHLORIDE 20 MG/1
TABLET, FILM COATED ORAL
Qty: 90 TAB | Refills: 1 | Status: SHIPPED | OUTPATIENT
Start: 2019-03-15 | End: 2019-05-30 | Stop reason: SDUPTHER

## 2019-03-19 DIAGNOSIS — E78.00 HYPERCHOLESTEREMIA: ICD-10-CM

## 2019-03-19 DIAGNOSIS — R09.82 POST-NASAL DRAINAGE: ICD-10-CM

## 2019-03-19 DIAGNOSIS — J45.40 MODERATE PERSISTENT ASTHMA WITHOUT COMPLICATION: ICD-10-CM

## 2019-03-19 DIAGNOSIS — L30.9 DERMATITIS: ICD-10-CM

## 2019-03-19 RX ORDER — RANITIDINE 150 MG/1
TABLET, FILM COATED ORAL
Qty: 180 TAB | Refills: 1 | Status: SHIPPED | OUTPATIENT
Start: 2019-03-19 | End: 2019-05-30 | Stop reason: SDUPTHER

## 2019-03-19 RX ORDER — DIVALPROEX SODIUM 500 MG/1
TABLET, DELAYED RELEASE ORAL
Qty: 180 TAB | Refills: 1 | Status: SHIPPED | OUTPATIENT
Start: 2019-03-19 | End: 2019-05-30 | Stop reason: SDUPTHER

## 2019-03-19 RX ORDER — MEMANTINE HYDROCHLORIDE 28 MG/1
CAPSULE, EXTENDED RELEASE ORAL
Qty: 90 CAP | Refills: 1 | Status: SHIPPED | OUTPATIENT
Start: 2019-03-19 | End: 2019-05-30 | Stop reason: SDUPTHER

## 2019-03-19 RX ORDER — MONTELUKAST SODIUM 10 MG/1
TABLET ORAL
Qty: 90 TAB | Refills: 1 | Status: SHIPPED | OUTPATIENT
Start: 2019-03-19 | End: 2019-05-30 | Stop reason: SDUPTHER

## 2019-03-19 RX ORDER — DONEPEZIL HYDROCHLORIDE 10 MG/1
TABLET, FILM COATED ORAL
Qty: 90 TAB | Refills: 1 | Status: SHIPPED | OUTPATIENT
Start: 2019-03-19 | End: 2019-05-30 | Stop reason: SDUPTHER

## 2019-03-19 RX ORDER — SIMVASTATIN 20 MG/1
TABLET, FILM COATED ORAL
Qty: 90 TAB | Refills: 1 | Status: SHIPPED | OUTPATIENT
Start: 2019-03-19 | End: 2019-05-30 | Stop reason: SDUPTHER

## 2019-03-19 RX ORDER — HYDROCORTISONE 25 MG/G
CREAM TOPICAL
Qty: 30 G | Refills: 0 | Status: SHIPPED | OUTPATIENT
Start: 2019-03-19 | End: 2019-04-20 | Stop reason: SDUPTHER

## 2019-03-19 RX ORDER — FLUTICASONE PROPIONATE 50 MCG
SPRAY, SUSPENSION (ML) NASAL
Qty: 48 G | Refills: 1 | Status: SHIPPED | OUTPATIENT
Start: 2019-03-19 | End: 2019-05-30 | Stop reason: SDUPTHER

## 2019-03-25 DIAGNOSIS — J45.40 MODERATE PERSISTENT ASTHMA WITHOUT COMPLICATION: ICD-10-CM

## 2019-03-26 RX ORDER — FLUTICASONE PROPIONATE AND SALMETEROL 250; 50 UG/1; UG/1
POWDER RESPIRATORY (INHALATION)
Qty: 3 INHALER | Refills: 1 | Status: SHIPPED | OUTPATIENT
Start: 2019-03-26 | End: 2019-05-30 | Stop reason: SDUPTHER

## 2019-05-30 ENCOUNTER — OFFICE VISIT (OUTPATIENT)
Dept: FAMILY MEDICINE CLINIC | Age: 59
End: 2019-05-30

## 2019-05-30 VITALS
TEMPERATURE: 98.6 F | HEART RATE: 61 BPM | SYSTOLIC BLOOD PRESSURE: 125 MMHG | BODY MASS INDEX: 26.68 KG/M2 | DIASTOLIC BLOOD PRESSURE: 78 MMHG | HEIGHT: 67 IN | WEIGHT: 170 LBS | OXYGEN SATURATION: 98 % | RESPIRATION RATE: 18 BRPM

## 2019-05-30 DIAGNOSIS — J45.40 MODERATE PERSISTENT ASTHMA WITHOUT COMPLICATION: ICD-10-CM

## 2019-05-30 DIAGNOSIS — M79.7 FIBROMYALGIA: ICD-10-CM

## 2019-05-30 DIAGNOSIS — R09.82 POST-NASAL DRAINAGE: ICD-10-CM

## 2019-05-30 DIAGNOSIS — L30.9 DERMATITIS: ICD-10-CM

## 2019-05-30 DIAGNOSIS — M54.42 CHRONIC LEFT-SIDED LOW BACK PAIN WITH LEFT-SIDED SCIATICA: ICD-10-CM

## 2019-05-30 DIAGNOSIS — E78.00 HYPERCHOLESTEREMIA: ICD-10-CM

## 2019-05-30 DIAGNOSIS — G89.29 CHRONIC LEFT-SIDED LOW BACK PAIN WITH LEFT-SIDED SCIATICA: ICD-10-CM

## 2019-05-30 RX ORDER — TOPIRAMATE 100 MG/1
100 TABLET, FILM COATED ORAL 2 TIMES DAILY
Qty: 180 TAB | Refills: 2 | Status: SHIPPED | OUTPATIENT
Start: 2019-05-30

## 2019-05-30 RX ORDER — DM/PSEUDOEPHED/ACETAMINOPH/CPM
TABLET ORAL
COMMUNITY
Start: 2019-04-02

## 2019-05-30 RX ORDER — ALBUTEROL SULFATE 90 UG/1
2 AEROSOL, METERED RESPIRATORY (INHALATION)
Qty: 3 INHALER | Refills: 1 | Status: SHIPPED | OUTPATIENT
Start: 2019-05-30 | End: 2019-08-28 | Stop reason: SDUPTHER

## 2019-05-30 RX ORDER — PAROXETINE HYDROCHLORIDE 20 MG/1
20 TABLET, FILM COATED ORAL DAILY
Qty: 90 TAB | Refills: 1 | Status: SHIPPED | OUTPATIENT
Start: 2019-05-30

## 2019-05-30 RX ORDER — DULOXETINE 40 MG/1
40 CAPSULE, DELAYED RELEASE ORAL DAILY
Qty: 90 CAP | Refills: 1 | Status: SHIPPED | OUTPATIENT
Start: 2019-05-30

## 2019-05-30 RX ORDER — GABAPENTIN 400 MG/1
400 CAPSULE ORAL 3 TIMES DAILY
Qty: 270 CAP | Refills: 1 | Status: SHIPPED | OUTPATIENT
Start: 2019-05-30

## 2019-05-30 RX ORDER — HYDROCORTISONE 25 MG/G
CREAM TOPICAL 2 TIMES DAILY
Qty: 90 G | Refills: 1 | Status: SHIPPED | OUTPATIENT
Start: 2019-05-30

## 2019-05-30 RX ORDER — FLUTICASONE PROPIONATE 50 MCG
2 SPRAY, SUSPENSION (ML) NASAL DAILY
Qty: 48 G | Refills: 1 | Status: SHIPPED | OUTPATIENT
Start: 2019-05-30

## 2019-05-30 RX ORDER — RANITIDINE 150 MG/1
150 TABLET, FILM COATED ORAL 2 TIMES DAILY
Qty: 180 TAB | Refills: 1 | Status: SHIPPED | OUTPATIENT
Start: 2019-05-30

## 2019-05-30 RX ORDER — TIZANIDINE 2 MG/1
2 TABLET ORAL 3 TIMES DAILY
Qty: 270 TAB | Refills: 1 | Status: SHIPPED | OUTPATIENT
Start: 2019-05-30

## 2019-05-30 RX ORDER — METHOCARBAMOL 500 MG/1
500 TABLET, FILM COATED ORAL
Qty: 90 TAB | Refills: 1 | Status: SHIPPED | OUTPATIENT
Start: 2019-05-30

## 2019-05-30 RX ORDER — MEMANTINE HYDROCHLORIDE 28 MG/1
28 CAPSULE, EXTENDED RELEASE ORAL DAILY
Qty: 90 CAP | Refills: 1 | Status: SHIPPED | OUTPATIENT
Start: 2019-05-30

## 2019-05-30 RX ORDER — FLUTICASONE PROPIONATE AND SALMETEROL 250; 50 UG/1; UG/1
1 POWDER RESPIRATORY (INHALATION) 2 TIMES DAILY
Qty: 3 INHALER | Refills: 1 | Status: SHIPPED | OUTPATIENT
Start: 2019-05-30

## 2019-05-30 RX ORDER — DIVALPROEX SODIUM 500 MG/1
500 TABLET, DELAYED RELEASE ORAL 2 TIMES DAILY
Qty: 180 TAB | Refills: 1 | Status: SHIPPED | OUTPATIENT
Start: 2019-05-30

## 2019-05-30 RX ORDER — MONTELUKAST SODIUM 10 MG/1
10 TABLET ORAL DAILY
Qty: 90 TAB | Refills: 1 | Status: SHIPPED | OUTPATIENT
Start: 2019-05-30

## 2019-05-30 RX ORDER — SIMVASTATIN 20 MG/1
20 TABLET, FILM COATED ORAL
Qty: 90 TAB | Refills: 1 | Status: SHIPPED | OUTPATIENT
Start: 2019-05-30 | End: 2019-06-18 | Stop reason: ALTCHOICE

## 2019-05-30 RX ORDER — THERMOMETER,ORAL,GLASS MERCURY
EACH MISCELLANEOUS
COMMUNITY
Start: 2019-04-02

## 2019-05-30 RX ORDER — ACETAMINOPHEN 500 MG/1
CAPSULE, LIQUID FILLED ORAL
COMMUNITY
Start: 2019-04-02

## 2019-05-30 RX ORDER — DONEPEZIL HYDROCHLORIDE 10 MG/1
10 TABLET, FILM COATED ORAL
Qty: 90 TAB | Refills: 1 | Status: SHIPPED | OUTPATIENT
Start: 2019-05-30

## 2019-05-30 NOTE — PROGRESS NOTES
1. Have you been to the ER, urgent care clinic since your last visit? Hospitalized since your last visit? No.     2. Have you seen or consulted any other health care providers outside of the [address] [place] [address] since your last visit? Include any pap smears or colon screeing. Yes.  Patient was prescribed hearing aids recently by audiologist.    Chief Complaint   Patient presents with    Fibromyalgia     follow up

## 2019-05-30 NOTE — PROGRESS NOTES
Franko Pace is a 66 y.o. female who presents with the following complaints:  Chief Complaint   Patient presents with    Fibromyalgia     follow up       Subjective:    HPI:   Presents for f/u of fibromyalgia, hypercholesterolemia, asthma, mild dementia, and depression. Feels fibromylagia is fairly well-controlled. She is walking more, and this aggravates her left low back pain. Reports good compliance with gabapentin, duloxetine, tolerating well without apparent se. methocarbmol and tizandine are helpful in relieving her pain. Notes significant improvement in depressive symptoms since last visit. She was able to return home to [facility] for several weeks and spend time with family and friends. She did not establish with mental health provider. She reports she will be moving to [place] next month to help her daughter and grand daughter. Dementia symptoms mild, stable, reports good compliance with memantine and donepezil. Asthma symptoms stable, report good compliance with ICS/LABA, rare use of rescue, no night time symptoms. Cardiovascular risk analysis - LDL goal is under 100. Compliance with treatment thus far has been fair. ROS: taking medications as instructed, no medication side effects noted. Diet and Lifestyle: generally follows a low fat low cholesterol diet, generally follows a low sodium diet, exercises regularly, nonsmoker  Home BP Monitoring: is not measured at home    Cardiovascular ROS: taking medications as instructed, no medication side effects noted, no TIA's, no chest pain on exertion, no dyspnea on exertion, no swelling of ankles, no orthostatic dizziness or lightheadedness, no orthopnea or paroxysmal nocturnal dyspnea, no palpitations, no intermittent claudication symptoms.               Pertinent PMH/FH/SH:  Past Medical History:   Diagnosis Date    Anal fistula     Asthma     Back ache     CAD (coronary artery disease)     leaky valves    Cholesterol blood decreased     Dementia     Depression     Deviated septum     Diabetes (HCC)     pre diabetese    Fibromyalgia     Frequent headaches     Heart valve disease     Herniated nucleus pulposus, L5-S1     Migraine     Protein S deficiency (HCC)     Pulmonary emboli (HCC)     Pulmonary hypertension (HCC)     Restless leg syndrome     RLS (restless legs syndrome)     Sickle cell trait (HCC)     Sleep apnea     Stroke (HCC)     Upper airway resistance syndrome     Vaginal fistula      Past Surgical History:   Procedure Laterality Date    CARDIAC SURG PROCEDURE UNLIST  9/23/99    closure of unroofed coronary sinus    HX ROTATOR CUFF REPAIR      3/2017     Family History   Problem Relation Age of Onset    Diabetes Mother     Heart Disease Mother     Hypertension Mother     Sickle Cell Anemia Sister     Hypertension Brother     Stroke Maternal Aunt      Social History     Socioeconomic History    Marital status: SINGLE     Spouse name: Not on file    Number of children: Not on file    Years of education: Not on file    Highest education level: Not on file   Tobacco Use    Smoking status: Never Smoker    Smokeless tobacco: Never Used   Substance and Sexual Activity    Alcohol use: No     Alcohol/week: 0.0 oz    Drug use: No    Sexual activity: Not Currently     Advanced Directives: N      Patient Active Problem List    Diagnosis    Fibromyalgia    Bilateral carpal tunnel syndrome    Rectal vaginal fistula    Valvular disease    Depression     She sees a psychiatrist she says      Prediabetes    Seasonal allergic rhinitis    Mild intermittent asthma without complication    Unroofed coronary sinus    Pulmonary embolism ([facility] [address]       Nurse notes were reviewed and are correct  Review of Systems - negative except as listed above in the HPI    Objective:     Vitals:    05/30/19 1102   BP: 125/78   Pulse: 61   Resp: 18   Temp: 98.6 °F (37 °C)   TempSrc: Oral   SpO2: 98%   Weight: 170 lb (77.1 kg)   Height: 5' 7\" (1.702 m)     Physical Examination: General appearance - alert, well appearing, and in no distress, oriented to person, place, and time, normal appearing weight and well hydrated  Mental status - normal mood, behavior, speech, dress, motor activity, and thought processes  Eyes - sclera anicteric  Neck - supple, no significant adenopathy  Chest - clear to auscultation, no wheezes, rales or rhonchi, symmetric air entry  Heart - normal rate, regular rhythm, normal S1, S2, no murmurs, rubs, clicks or gallops, no JVD  Abdomen - soft, nontender, nondistended, no masses or organomegaly  bowel sounds normal  Back exam - tenderness noted left lumbar, sacroiliac joints and sciatic notches nontender, normal reflexes and strength bilateral lower extremities  Neurological - alert, oriented, normal speech, no focal findings or movement disorder noted  Extremities - no pedal edema noted  Skin - normal coloration and turgor, no rashes, no suspicious skin lesions noted    Assessment/ Plan:   Diagnoses and all orders for this visit:    1. Moderate persistent asthma without complication  Stable symptom pattern  Continue ICS/LABA  Continue montelukast  Albuterol prn  -     fluticasone propion-salmeterol (ADVAIR/WIXELA) 250-50 mcg/dose diskus inhaler; Take 1 Puff by inhalation two (2) times a day. -     montelukast (SINGULAIR) 10 mg tablet; Take 1 Tab by mouth daily. -     albuterol (PROVENTIL HFA, VENTOLIN HFA, PROAIR HFA) 90 mcg/actuation inhaler; Take 2 Puffs by inhalation every four (4) hours as needed for Wheezing. 2. Fibromyalgia  Symptoms adequately managed  Continue gabapentin, exercise program  -     gabapentin (NEURONTIN) 400 mg capsule; Take 1 Cap by mouth three (3) times daily. 3. Dermatitis  prm  -     hydrocortisone (HYTONE) 2.5 % topical cream; Apply  to affected area two (2) times a day.     4. Hypercholesteremia  TLC Diet:  -- Saturated fat <7% of calories, cholesterol <200 mg/day  -- Consider increased viscous (soluble) fiber (10-25 g/day) and plant stanols/sterols  (2g/day) as therapeutic options to enhance LDL lowering  Weight management  Increased physical activity. Continue zocor at current dose pending results  -     simvastatin (ZOCOR) 20 mg tablet; Take 1 Tab by mouth nightly. -     METABOLIC PANEL, COMPREHENSIVE  -     LIPID PANEL    5. Post-nasal drainage  -     fluticasone propionate (FLONASE) 50 mcg/actuation nasal spray; 2 Sprays by Both Nostrils route daily. 6. Chronic left-sided low back pain with left-sided sciatica  Recommend pursuing physical therapy after her move to [place]  -     methocarbamol (ROBAXIN) 500 mg tablet; Take 1 Tab by mouth nightly as needed (muscle spams). Other orders  -     raNITIdine (ZANTAC) 150 mg tablet; Take 1 Tab by mouth two (2) times a day. -     memantine ER (NAMENDA XR) 28 mg capsule; Take 1 Cap by mouth daily. -     donepezil (ARICEPT) 10 mg tablet; Take 1 Tab by mouth nightly. -     divalproex DR (DEPAKOTE) 500 mg tablet; Take 1 Tab by mouth two (2) times a day. -     DULoxetine 40 mg cpDR; Take 40 mg by mouth daily.  -     PARoxetine (PAXIL) 20 mg tablet; Take 1 Tab by mouth daily. -     tiZANidine (ZANAFLEX) 2 mg tablet; Take 1 Tab by mouth three (3) times daily. -     topiramate (TOPAMAX) 100 mg tablet; Take 1 Tab by mouth two (2) times a day. Follow-up and Dispositions    · Return in about 3 months (around 8/30/2019), or if symptoms worsen or fail to improve. I have discussed the diagnosis with the patient and the intended plan as seen in the above orders. The patient has received an after-visit summary and questions were answered concerning future plans. The patient verbalizes understanding.     Medication Side Effects and Warnings were discussed with patient: yes  Patient Labs were reviewed and or requested: yes  Patient Past Records were reviewed and or requested: yes    Patient Instructions          Fibromyalgia: Care Instructions  Your Care Instructions    Fibromyalgia is a painful condition that is not completely understood by medical experts. The cause of fibromyalgia is not known. It can make you feel tired and ache all over. It causes tender spots at specific points of the body that hurt only when you press on them. You may have trouble sleeping, as well as other symptoms. These problems can upset your work and home life. Symptoms tend to come and go, although they may never go away completely. Fibromyalgia does not harm your muscles, joints, or organs. Follow-up care is a key part of your treatment and safety. Be sure to make and go to all appointments, and call your doctor if you are having problems. It's also a good idea to know your test results and keep a list of the medicines you take. How can you care for yourself at home? · Exercise often. Walk, swim, or bike to help with pain and sleep problems and to make you feel better. · Try to get a good night's sleep. Go to bed and get up at the same time each day, whether you feel rested or not. Make sure you have a good mattress and pillow. · Reduce stress. Avoid things that cause you stress, if you can. If not, work at making them less stressful. Learn to use biofeedback, guided imagery, meditation, or other methods to relax. · Make healthy changes. Eat a balanced diet, quit smoking, and limit alcohol and caffeine. · Use a heating pad set on low or take warm baths or showers for pain. Using cold packs for up to 20 minutes at a time can also relieve pain. Put a thin cloth between the cold pack and your skin. A gentle massage might help too. · Be safe with medicines. Take your medicines exactly as prescribed. Call your doctor if you think you are having a problem with your medicine. Your doctor may talk to you about taking antidepressant medicines. These medicines may improve sleep, relieve pain, and in some cases treat depression. · Learn about fibromyalgia.  This makes coping easier. Then, take an active role in your treatment. · Think about joining a support group with others who have fibromyalgia to learn more and get support. When should you call for help? Watch closely for changes in your health, and be sure to contact your doctor if:    · You feel sad, helpless, or hopeless; lose interest in things you used to enjoy; or have other symptoms of depression.     · Your fibromyalgia symptoms get worse. Where can you learn more? Go to http://shell-td.info/. Enter V003 in the search box to learn more about \"Fibromyalgia: Care Instructions. \"  Current as of: Molly 3, 2018  Content Version: 11.9  © 5882-6222 [employer]. Care instructions adapted under license by [employer] (which disclaims liability or warranty for this information). If you have questions about a medical condition or this instruction, always ask your healthcare professional. [address] any warranty or liability for your use of this information.             Ashutosh JOSEPH

## 2019-05-30 NOTE — PATIENT INSTRUCTIONS
Fibromyalgia: Care Instructions  Your Care Instructions    Fibromyalgia is a painful condition that is not completely understood by medical experts. The cause of fibromyalgia is not known. It can make you feel tired and ache all over. It causes tender spots at specific points of the body that hurt only when you press on them. You may have trouble sleeping, as well as other symptoms. These problems can upset your work and home life. Symptoms tend to come and go, although they may never go away completely. Fibromyalgia does not harm your muscles, joints, or organs. Follow-up care is a key part of your treatment and safety. Be sure to make and go to all appointments, and call your doctor if you are having problems. It's also a good idea to know your test results and keep a list of the medicines you take. How can you care for yourself at home? · Exercise often. Walk, swim, or bike to help with pain and sleep problems and to make you feel better. · Try to get a good night's sleep. Go to bed and get up at the same time each day, whether you feel rested or not. Make sure you have a good mattress and pillow. · Reduce stress. Avoid things that cause you stress, if you can. If not, work at making them less stressful. Learn to use biofeedback, guided imagery, meditation, or other methods to relax. · Make healthy changes. Eat a balanced diet, quit smoking, and limit alcohol and caffeine. · Use a heating pad set on low or take warm baths or showers for pain. Using cold packs for up to 20 minutes at a time can also relieve pain. Put a thin cloth between the cold pack and your skin. A gentle massage might help too. · Be safe with medicines. Take your medicines exactly as prescribed. Call your doctor if you think you are having a problem with your medicine. Your doctor may talk to you about taking antidepressant medicines. These medicines may improve sleep, relieve pain, and in some cases treat depression.   · Learn about fibromyalgia. This makes coping easier. Then, take an active role in your treatment. · Think about joining a support group with others who have fibromyalgia to learn more and get support. When should you call for help? Watch closely for changes in your health, and be sure to contact your doctor if:    · You feel sad, helpless, or hopeless; lose interest in things you used to enjoy; or have other symptoms of depression.     · Your fibromyalgia symptoms get worse. Where can you learn more? Go to http://shell-td.info/. Enter V003 in the search box to learn more about \"Fibromyalgia: Care Instructions. \"  Current as of: Molly 3, 2018  Content Version: 11.9  © 4702-8529 [employer]. Care instructions adapted under license by [employer] (which disclaims liability or warranty for this information). If you have questions about a medical condition or this instruction, always ask your healthcare professional. [address] any warranty or liability for your use of this information.

## 2019-06-04 ENCOUNTER — TELEPHONE (OUTPATIENT)
Dept: FAMILY MEDICINE CLINIC | Age: 59
End: 2019-06-04

## 2019-06-04 NOTE — TELEPHONE ENCOUNTER
Patient feels that tizanidine works best. She would like to stay on it. Clarification sent to [facility].

## 2019-06-04 NOTE — TELEPHONE ENCOUNTER
Please contact patient to see which of the two she prefers, then call [facility] to discontinue the non-preferred medication.

## 2019-06-04 NOTE — TELEPHONE ENCOUNTER
Received fax from [facility] requesting clarification on if provider wants patient to take both methocarbamol and tizanidine.

## 2019-06-05 ENCOUNTER — OFFICE VISIT (OUTPATIENT)
Dept: NEUROLOGY | Age: 59
End: 2019-06-05

## 2019-06-05 VITALS
SYSTOLIC BLOOD PRESSURE: 120 MMHG | DIASTOLIC BLOOD PRESSURE: 72 MMHG | OXYGEN SATURATION: 98 % | HEART RATE: 63 BPM | WEIGHT: 171 LBS | RESPIRATION RATE: 18 BRPM | BODY MASS INDEX: 26.78 KG/M2

## 2019-06-05 DIAGNOSIS — F32.A DEPRESSION, UNSPECIFIED DEPRESSION TYPE: ICD-10-CM

## 2019-06-05 DIAGNOSIS — G43.009 MIGRAINE WITHOUT AURA AND WITHOUT STATUS MIGRAINOSUS, NOT INTRACTABLE: Primary | ICD-10-CM

## 2019-06-05 DIAGNOSIS — M79.7 FIBROMYALGIA: ICD-10-CM

## 2019-06-05 RX ORDER — SUMATRIPTAN 50 MG/1
50 TABLET, FILM COATED ORAL
Qty: 9 TAB | Refills: 1 | Status: SHIPPED | OUTPATIENT
Start: 2019-06-05 | End: 2019-06-05

## 2019-06-05 NOTE — PATIENT INSTRUCTIONS
[facility]   Statement to Patients  April 1, 2014      In an effort to ensure the large volume of patient prescription refills is processed in the most efficient and expeditious manner, we are asking our patients to assist us by calling your Pharmacy for all prescription refills, this will include also your  Mail Order Pharmacy. The pharmacy will contact our office electronically to continue the refill process. Please do not wait until the last minute to call your pharmacy. We need at least 48 hours (2days) to fill prescriptions. We also encourage you to call your pharmacy before going to  your prescription to make sure it is ready. With regard to controlled substance prescription refill requests (narcotic refills) that need to be picked up at our office, we ask your cooperation by providing us with at least 72 hours (3days) notice that you will need a refill. We will not refill narcotic prescription refill requests after 4:00pm on any weekday, Monday through Thursday, or after 2:00pm on Fridays, or on the weekends. We encourage everyone to explore another way of getting your prescription refill request processed using [employer], our patient web portal through our electronic medical record system. [employer] is an efficient and effective way to communicate your medication request directly to the office and  downloadable as an frantz on your smart phone . [employer] also features a review functionality that allows you to view your medication list as well as leave messages for your physician. Are you ready to get connected? If so please review the attatched instructions or speak to any of our staff to get you set up right away! Thank you so much for your cooperation. Should you have any questions please contact our Practice Administrator.     The Physicians and Staff,  [facility]

## 2019-06-05 NOTE — PROGRESS NOTES
Neurology Clinic Follow up Note    Patient ID:  Adrianna Jimenez  [id number]  76 y.o.  1960      Ms. Sneha Fernandes is here for follow up today of migraines. Last Appointment With Me:  11/6/18      Interval History:   Pt is moving to [place] next month to be closer to her daughter. Headaches have been doing much better. HA frequency per month is 2-3x, more mild. She is using tylenol and/or Imitrex for rescue tx which works well for her. She remains on TPM 100mg BID and VPA 500mg BID and Cymbalta 40mg/d. No reported side effects. Fibromyalgia is controlled on current medication. She feels the Cymbalta has been helpful. She reports depression is well controlled. PMHx/ PSHx/ FHx/ SHx:  Reviewed and unchanged previous visit. Past Medical History:   Diagnosis Date    Anal fistula     Asthma     Back ache     CAD (coronary artery disease)     leaky valves    Cholesterol blood decreased     Dementia     Depression     Deviated septum     Diabetes (HCC)     pre diabetese    Fibromyalgia     Frequent headaches     Heart valve disease     Herniated nucleus pulposus, L5-S1     Migraine     Protein S deficiency (HCC)     Pulmonary emboli (HCC)     Pulmonary hypertension (HCC)     Restless leg syndrome     RLS (restless legs syndrome)     Sickle cell trait (HCC)     Sleep apnea     Stroke (HCC)     Upper airway resistance syndrome     Vaginal fistula          ROS:  Comprehensive review of systems negative except for as noted above. Objective:       Meds:  Current Outpatient Medications   Medication Sig Dispense Refill    blood pressure test kit (WRIST BLOOD PRESSURE MONITOR)       TRIPLE ANTIBIOTIC PLUS 3.5-500-10,000 mg-unit-unit/g oint       oral thermometer misc       ECZEMA MOISTURIZING CREAM 1 % crea       acetaminophen (TYLENOL) 500 mg capsule       hydrocortisone (HYTONE) 2.5 % topical cream Apply  to affected area two (2) times a day.  90 g 1    fluticasone propion-salmeterol (ADVAIR/WIXELA) 250-50 mcg/dose diskus inhaler Take 1 Puff by inhalation two (2) times a day. 3 Inhaler 1    raNITIdine (ZANTAC) 150 mg tablet Take 1 Tab by mouth two (2) times a day. 180 Tab 1    simvastatin (ZOCOR) 20 mg tablet Take 1 Tab by mouth nightly. 90 Tab 1    memantine ER (NAMENDA XR) 28 mg capsule Take 1 Cap by mouth daily. 90 Cap 1    donepezil (ARICEPT) 10 mg tablet Take 1 Tab by mouth nightly. 90 Tab 1    divalproex DR (DEPAKOTE) 500 mg tablet Take 1 Tab by mouth two (2) times a day. 180 Tab 1    montelukast (SINGULAIR) 10 mg tablet Take 1 Tab by mouth daily. 90 Tab 1    fluticasone propionate (FLONASE) 50 mcg/actuation nasal spray 2 Sprays by Both Nostrils route daily. 48 g 1    DULoxetine 40 mg cpDR Take 40 mg by mouth daily. 90 Cap 1    PARoxetine (PAXIL) 20 mg tablet Take 1 Tab by mouth daily. 90 Tab 1    tiZANidine (ZANAFLEX) 2 mg tablet Take 1 Tab by mouth three (3) times daily. 270 Tab 1    methocarbamol (ROBAXIN) 500 mg tablet Take 1 Tab by mouth nightly as needed (muscle spams). 90 Tab 1    topiramate (TOPAMAX) 100 mg tablet Take 1 Tab by mouth two (2) times a day. 180 Tab 2    gabapentin (NEURONTIN) 400 mg capsule Take 1 Cap by mouth three (3) times daily. 270 Cap 1    albuterol (PROVENTIL HFA, VENTOLIN HFA, PROAIR HFA) 90 mcg/actuation inhaler Take 2 Puffs by inhalation every four (4) hours as needed for Wheezing. 3 Inhaler 1    aspirin delayed-release 81 mg tablet Take 1 Tab by mouth daily. 90 Tab 1    aspirin-acetaminophen-caffeine (EXCEDRIN ES) 250-250-65 mg per tablet Take 1 Tab by mouth.  acetaminophen (TYLENOL) 325 mg tablet Take  by mouth every four (4) hours as needed for Pain.  albuterol (PROVENTIL VENTOLIN) 2.5 mg /3 mL (0.083 %) nebulizer solution 3 mL by Nebulization route every four (4) hours as needed for Wheezing. 25 Each 2    Cetirizine 10 mg cap Take  by mouth.       albuterol (PROVENTIL VENTOLIN) 2.5 mg /3 mL (0.083 %) nebulizer solution by Nebulization route once. Exam:  Visit Vitals  /72   Pulse 63   Resp 18   Wt 77.6 kg (171 lb)   SpO2 98%   BMI 26.78 kg/m²     NEUROLOGICAL EXAM:  General: Awake, alert, speech fluent. CN: PERRL, EOMI without nystagmus, VFF to confrontation, facial sensation and strength are normal and symmetric, hearing is intact to finger rub bilaterally, palate and tongue movements are intact and symmetric. Motor: Normal tone, bulk and strength bilaterally. Reflexes: 2/4 and symmetric, plantar stimulation is flexor. Coordination: FNF, MAMADOU, HTS intact. Sensation: LT intact throughout. Gait: Normal-based and steady. LABS  Results for orders placed or performed in visit on 11/07/18   NUSWAB VAGINITIS PLUS   Result Value Ref Range    Atopobium vaginae High - 2 (A) Score    BVAB 2 Low - 0 Score    Megasphaera 1 Low - 0 Score    C. albicans, ANABEL Negative Negative    C. glabrata, ANABEL Negative Negative    T. vaginalis, ANABEL Negative Negative    C. trachomatis, ANABEL Negative Negative    N. gonorrhoeae, ANABEL Negative Negative     Lab Results   Component Value Date/Time    Sodium 146 (H) 08/07/2018 10:13 AM    Potassium 4.4 08/07/2018 10:13 AM    Chloride 110 (H) 08/07/2018 10:13 AM    CO2 22 08/07/2018 10:13 AM    Glucose 89 08/07/2018 10:13 AM    BUN 14 08/07/2018 10:13 AM    Creatinine 0.91 08/07/2018 10:13 AM    BUN/Creatinine ratio 15 08/07/2018 10:13 AM    GFR est AA 82 08/07/2018 10:13 AM    GFR est non-AA 71 08/07/2018 10:13 AM    Calcium 9.3 08/07/2018 10:13 AM    Bilirubin, total 0.3 08/07/2018 10:13 AM    AST (SGOT) 22 08/07/2018 10:13 AM    Alk. phosphatase 100 08/07/2018 10:13 AM    Protein, total 7.0 08/07/2018 10:13 AM    Albumin 4.3 08/07/2018 10:13 AM    A-G Ratio 1.6 08/07/2018 10:13 AM    ALT (SGPT) 13 08/07/2018 10:13 AM       IMAGING:  MRI Results (most recent):  No results found for this or any previous visit.       Assessment:     Encounter Diagnoses     ICD-10-CM ICD-9-CM 1. Migraine without aura and without status migrainosus, not intractable G43.009 346.10   2. Depression, unspecified depression type F32.9 311   3. Fibromyalgia M79.7 67.1      66year old AAF with a h/o depression, fibromyalgia here for f/u of chronic migraines since age 17, L hemispheric refractory to multiple preventative medications. She has failed TCA therapy in the past due to side effects. TPM, VPA in combination with Cymbalta has worked well for her migraine ppx as well as fibromyalgia. No reported side effects with current preventatives. She is planning to move to [place] next month to be closely to her daughter. She will establish care locally with a neurologist.      Headache education  Discussed treatment options, both abortive and preventive medications. Instructed patient about medications and potential side effects. Discussed medication overuse headache and to limit use of analgesics to less than 2 doses per week. Plan:   Cont. TPM 100mg BID, VPA 500mg BID, Cymbalta 40mg/d for HA ppx. Imitrex 50mg PRN for abortive migraine tx, limit use to no more than twice weekly. May use tylenol sparingly for less severe headaches. Follow-up and Dispositions    · Return if symptoms worsen or fail to improve.            Signed:  Day Choi DO  6/5/2019

## 2019-06-06 LAB
ALBUMIN SERPL-MCNC: 4.3 G/DL (ref 3.5–5.5)
ALBUMIN/GLOB SERPL: 1.7 {RATIO} (ref 1.2–2.2)
ALP SERPL-CCNC: 75 IU/L (ref 39–117)
ALT SERPL-CCNC: 12 IU/L (ref 0–32)
AST SERPL-CCNC: 12 IU/L (ref 0–40)
BILIRUB SERPL-MCNC: 0.2 MG/DL (ref 0–1.2)
BUN SERPL-MCNC: 16 MG/DL (ref 6–24)
BUN/CREAT SERPL: 16 (ref 9–23)
CALCIUM SERPL-MCNC: 9.4 MG/DL (ref 8.7–10.2)
CHLORIDE SERPL-SCNC: 107 MMOL/L (ref 96–106)
CHOLEST SERPL-MCNC: 202 MG/DL (ref 100–199)
CO2 SERPL-SCNC: 22 MMOL/L (ref 20–29)
CREAT SERPL-MCNC: 1.02 MG/DL (ref 0.57–1)
GLOBULIN SER CALC-MCNC: 2.5 G/DL (ref 1.5–4.5)
GLUCOSE SERPL-MCNC: 84 MG/DL (ref 65–99)
HDLC SERPL-MCNC: 63 MG/DL
INTERPRETATION, 910389: NORMAL
LDLC SERPL CALC-MCNC: 125 MG/DL (ref 0–99)
POTASSIUM SERPL-SCNC: 4.4 MMOL/L (ref 3.5–5.2)
PROT SERPL-MCNC: 6.8 G/DL (ref 6–8.5)
SODIUM SERPL-SCNC: 143 MMOL/L (ref 134–144)
TRIGL SERPL-MCNC: 71 MG/DL (ref 0–149)
VLDLC SERPL CALC-MCNC: 14 MG/DL (ref 5–40)

## 2019-06-14 NOTE — PROGRESS NOTES
Creatinine is a little elevated- recommend staying well-hydrated, avoiding NSAIDs to improve this, recheck in 6 weeks. Total and LDL cholesterol have worsened considerably- has she been taking her simvastatin consistently? If not, take daily and recheck in 6 weeks fasting. If so, need to increase the dose.

## 2019-06-18 ENCOUNTER — TELEPHONE (OUTPATIENT)
Dept: FAMILY MEDICINE CLINIC | Age: 59
End: 2019-06-18

## 2019-06-18 DIAGNOSIS — E78.00 HYPERCHOLESTEREMIA: Primary | ICD-10-CM

## 2019-06-18 RX ORDER — ATORVASTATIN CALCIUM 40 MG/1
40 TABLET, FILM COATED ORAL DAILY
Qty: 90 TAB | Refills: 0 | Status: SHIPPED | OUTPATIENT
Start: 2019-06-18

## 2019-06-18 NOTE — TELEPHONE ENCOUNTER
Atorvastatin 40 mg sent to her pharmacy. Stop the simvastatin. Needs fasting cholesterol and liver function checked in 6-8 weeks.

## 2019-06-18 NOTE — TELEPHONE ENCOUNTER
Patient informed of provider lab result note stating she was taking her medications as prescribed. She's moving to [place] in 2 weeks and will like for medication to be sent to local pharmacy. Recommended she have new provider fax release for records. patient verbalized an understanding.

## 2019-06-18 NOTE — TELEPHONE ENCOUNTER
----- Message from Florin Godinez NP sent at 6/14/2019  3:24 PM EDT -----  Creatinine is a little elevated- recommend staying well-hydrated, avoiding NSAIDs to improve this, recheck in 6 weeks. Total and LDL cholesterol have worsened considerably- has she been taking her simvastatin consistently? If not, take daily and recheck in 6 weeks fasting. If so, need to increase the dose.

## 2019-08-08 RX ORDER — DULOXETINE 40 MG/1
40 CAPSULE, DELAYED RELEASE ORAL DAILY
Qty: 90 CAP | Refills: 1 | Status: CANCELLED | OUTPATIENT
Start: 2019-08-08

## 2019-08-28 DIAGNOSIS — J45.40 MODERATE PERSISTENT ASTHMA WITHOUT COMPLICATION: ICD-10-CM

## 2019-08-28 RX ORDER — ALBUTEROL SULFATE 90 UG/1
2 AEROSOL, METERED RESPIRATORY (INHALATION)
Qty: 3 INHALER | Refills: 1 | Status: SHIPPED | OUTPATIENT
Start: 2019-08-28 | End: 2019-08-30 | Stop reason: SDUPTHER

## 2019-08-30 DIAGNOSIS — J45.40 MODERATE PERSISTENT ASTHMA WITHOUT COMPLICATION: ICD-10-CM

## 2019-08-30 RX ORDER — ALBUTEROL SULFATE 90 UG/1
2 AEROSOL, METERED RESPIRATORY (INHALATION)
Qty: 3 INHALER | Refills: 1 | Status: SHIPPED | OUTPATIENT
Start: 2019-08-30

## 2019-09-05 DIAGNOSIS — J45.41 MODERATE PERSISTENT ASTHMA WITH ACUTE EXACERBATION IN ADULT: ICD-10-CM

## 2019-09-05 DIAGNOSIS — J45.40 MODERATE PERSISTENT ASTHMA WITHOUT COMPLICATION: ICD-10-CM

## 2019-09-06 RX ORDER — ALBUTEROL SULFATE 90 UG/1
2 AEROSOL, METERED RESPIRATORY (INHALATION)
Qty: 3 INHALER | Refills: 1 | COMMUNITY
Start: 2019-09-06

## 2024-03-14 NOTE — TELEPHONE ENCOUNTER
Advised pt negative results for rheumatoid arthritis. Pt is wondering if pain is associated with her fibromyalgia and what can be done about the pain she experiences?
Make an appointment to discuss further
Spoke with pt and she scheduled an appt for October 5th at 4:30  p.m.
15